# Patient Record
Sex: MALE | Race: WHITE | NOT HISPANIC OR LATINO | Employment: OTHER | URBAN - METROPOLITAN AREA
[De-identification: names, ages, dates, MRNs, and addresses within clinical notes are randomized per-mention and may not be internally consistent; named-entity substitution may affect disease eponyms.]

---

## 2018-03-05 ENCOUNTER — TELEPHONE (OUTPATIENT)
Dept: PRIMARY CARE | Facility: CLINIC | Age: 63
End: 2018-03-05

## 2018-03-06 LAB — HCV AB SER-ACNC: NEGATIVE

## 2018-03-07 PROBLEM — F98.8 ATTENTION DEFICIT DISORDER (ADD) WITHOUT HYPERACTIVITY: Status: ACTIVE | Noted: 2018-03-07

## 2018-03-07 PROBLEM — F32.A DEPRESSION: Status: ACTIVE | Noted: 2018-03-07

## 2018-03-07 PROBLEM — G47.33 OSA (OBSTRUCTIVE SLEEP APNEA): Status: ACTIVE | Noted: 2018-03-07

## 2018-03-07 PROBLEM — J30.9 ALLERGIC RHINITIS: Status: ACTIVE | Noted: 2018-03-07

## 2018-03-07 PROBLEM — K21.9 GERD (GASTROESOPHAGEAL REFLUX DISEASE): Status: ACTIVE | Noted: 2018-03-07

## 2018-03-07 PROBLEM — M17.9 OSTEOARTHRITIS OF KNEE: Status: ACTIVE | Noted: 2018-03-07

## 2018-03-07 PROBLEM — K63.5 POLYP OF COLON: Status: ACTIVE | Noted: 2018-03-07

## 2018-03-07 LAB
ALBUMIN SERPL-MCNC: 4.1 G/DL (ref 3.6–4.8)
ALBUMIN/GLOB SERPL: 1.7 {RATIO} (ref 1.2–2.2)
ALP SERPL-CCNC: 63 IU/L (ref 39–117)
ALT SERPL-CCNC: 22 IU/L (ref 0–44)
AST SERPL-CCNC: 23 IU/L (ref 0–40)
BASOPHILS # BLD AUTO: 0 X10E3/UL (ref 0–0.2)
BASOPHILS NFR BLD AUTO: 0 %
BILIRUB SERPL-MCNC: 0.5 MG/DL (ref 0–1.2)
BUN SERPL-MCNC: 20 MG/DL (ref 8–27)
BUN/CREAT SERPL: 22 (ref 10–24)
CALCIUM SERPL-MCNC: 9.2 MG/DL (ref 8.6–10.2)
CHLORIDE SERPL-SCNC: 102 MMOL/L (ref 96–106)
CHOLEST SERPL-MCNC: 198 MG/DL (ref 100–199)
CO2 SERPL-SCNC: 22 MMOL/L (ref 18–29)
CREAT SERPL-MCNC: 0.91 MG/DL (ref 0.76–1.27)
EOSINOPHIL # BLD AUTO: 0.3 X10E3/UL (ref 0–0.4)
EOSINOPHIL NFR BLD AUTO: 5 %
ERYTHROCYTE [DISTWIDTH] IN BLOOD BY AUTOMATED COUNT: 14.3 % (ref 12.3–15.4)
GFR SERPLBLD CREATININE-BSD FMLA CKD-EPI: 103 ML/MIN/1.73
GFR SERPLBLD CREATININE-BSD FMLA CKD-EPI: 89 ML/MIN/1.73
GLOBULIN SER CALC-MCNC: 2.4 G/DL (ref 1.5–4.5)
GLUCOSE SERPL-MCNC: 101 MG/DL (ref 65–99)
HCT VFR BLD AUTO: 42 % (ref 37.5–51)
HCV AB S/CO SERPL IA: <0.1 S/CO RATIO (ref 0–0.9)
HDLC SERPL-MCNC: 62 MG/DL
HGB BLD-MCNC: 14.6 G/DL (ref 13–17.7)
IMM GRANULOCYTES # BLD: 0 X10E3/UL (ref 0–0.1)
IMM GRANULOCYTES NFR BLD: 0 %
LDLC SERPL CALC-MCNC: 115 MG/DL (ref 0–99)
LYMPHOCYTES # BLD AUTO: 1.8 X10E3/UL (ref 0.7–3.1)
LYMPHOCYTES NFR BLD AUTO: 33 %
MCH RBC QN AUTO: 29.3 PG (ref 26.6–33)
MCHC RBC AUTO-ENTMCNC: 34.8 G/DL (ref 31.5–35.7)
MCV RBC AUTO: 84 FL (ref 79–97)
MONOCYTES # BLD AUTO: 0.6 X10E3/UL (ref 0.1–0.9)
MONOCYTES NFR BLD AUTO: 10 %
NEUTROPHILS # BLD AUTO: 2.8 X10E3/UL (ref 1.4–7)
NEUTROPHILS NFR BLD AUTO: 52 %
PLATELET # BLD AUTO: 240 X10E3/UL (ref 150–379)
POTASSIUM SERPL-SCNC: 4.3 MMOL/L (ref 3.5–5.2)
PROT SERPL-MCNC: 6.5 G/DL (ref 6–8.5)
PSA SERPL-MCNC: 0.8 NG/ML (ref 0–4)
RBC # BLD AUTO: 4.98 X10E6/UL (ref 4.14–5.8)
SODIUM SERPL-SCNC: 140 MMOL/L (ref 134–144)
TRIGL SERPL-MCNC: 103 MG/DL (ref 0–149)
TSH SERPL DL<=0.005 MIU/L-ACNC: 1.76 UIU/ML (ref 0.45–4.5)
VLDLC SERPL CALC-MCNC: 21 MG/DL (ref 5–40)
WBC # BLD AUTO: 5.5 X10E3/UL (ref 3.4–10.8)

## 2018-03-07 RX ORDER — CITALOPRAM 20 MG/1
10 TABLET, FILM COATED ORAL DAILY
COMMUNITY
Start: 2017-11-21 | End: 2019-01-07 | Stop reason: SDUPTHER

## 2018-03-07 RX ORDER — AMOXICILLIN 500 MG/1
2000 TABLET, FILM COATED ORAL ONCE
COMMUNITY
Start: 2017-11-06 | End: 2018-06-08 | Stop reason: SDUPTHER

## 2018-03-07 RX ORDER — FLUTICASONE PROPIONATE 50 MCG
2 SPRAY, SUSPENSION (ML) NASAL DAILY PRN
COMMUNITY
Start: 2016-03-10 | End: 2022-10-19 | Stop reason: ALTCHOICE

## 2018-03-07 RX ORDER — CHOLECALCIFEROL (VITAMIN D3) 50 MCG
2000 TABLET ORAL DAILY
COMMUNITY
Start: 2011-03-09 | End: 2022-10-10 | Stop reason: ALTCHOICE

## 2018-03-07 RX ORDER — OMEPRAZOLE 20 MG/1
20 CAPSULE, DELAYED RELEASE ORAL EVERY OTHER DAY
COMMUNITY
Start: 2016-04-01

## 2018-03-11 PROBLEM — K63.5 POLYP OF COLON: Chronic | Status: ACTIVE | Noted: 2018-03-07

## 2018-03-11 PROBLEM — J30.9 ALLERGIC RHINITIS: Chronic | Status: ACTIVE | Noted: 2018-03-07

## 2018-03-11 PROBLEM — G47.33 OSA (OBSTRUCTIVE SLEEP APNEA): Chronic | Status: ACTIVE | Noted: 2018-03-07

## 2018-03-11 PROBLEM — M17.9 OSTEOARTHRITIS OF KNEE: Chronic | Status: ACTIVE | Noted: 2018-03-07

## 2018-03-11 PROBLEM — F98.8 ATTENTION DEFICIT DISORDER (ADD) WITHOUT HYPERACTIVITY: Chronic | Status: ACTIVE | Noted: 2018-03-07

## 2018-03-11 PROBLEM — F32.A DEPRESSION: Chronic | Status: ACTIVE | Noted: 2018-03-07

## 2018-03-11 PROBLEM — K21.9 GERD (GASTROESOPHAGEAL REFLUX DISEASE): Chronic | Status: ACTIVE | Noted: 2018-03-07

## 2018-03-12 ENCOUNTER — OFFICE VISIT (OUTPATIENT)
Dept: PRIMARY CARE | Facility: CLINIC | Age: 63
End: 2018-03-12
Attending: FAMILY MEDICINE
Payer: COMMERCIAL

## 2018-03-12 VITALS
RESPIRATION RATE: 16 BRPM | HEIGHT: 70 IN | WEIGHT: 277.2 LBS | OXYGEN SATURATION: 98 % | SYSTOLIC BLOOD PRESSURE: 112 MMHG | HEART RATE: 72 BPM | TEMPERATURE: 97.5 F | BODY MASS INDEX: 39.69 KG/M2 | DIASTOLIC BLOOD PRESSURE: 78 MMHG

## 2018-03-12 DIAGNOSIS — M17.9 OSTEOARTHRITIS OF KNEE, UNSPECIFIED LATERALITY, UNSPECIFIED OSTEOARTHRITIS TYPE: Chronic | ICD-10-CM

## 2018-03-12 DIAGNOSIS — J30.2 CHRONIC SEASONAL ALLERGIC RHINITIS, UNSPECIFIED TRIGGER: Chronic | ICD-10-CM

## 2018-03-12 DIAGNOSIS — D12.6 ADENOMATOUS POLYP OF COLON, UNSPECIFIED PART OF COLON: Chronic | ICD-10-CM

## 2018-03-12 DIAGNOSIS — F98.8 ATTENTION DEFICIT DISORDER (ADD) WITHOUT HYPERACTIVITY: Chronic | ICD-10-CM

## 2018-03-12 DIAGNOSIS — K21.9 GASTROESOPHAGEAL REFLUX DISEASE, ESOPHAGITIS PRESENCE NOT SPECIFIED: Chronic | ICD-10-CM

## 2018-03-12 DIAGNOSIS — R20.2 PARESTHESIA OF RIGHT ARM: ICD-10-CM

## 2018-03-12 DIAGNOSIS — Z00.00 ENCOUNTER FOR GENERAL ADULT MEDICAL EXAMINATION WITHOUT ABNORMAL FINDINGS: Primary | ICD-10-CM

## 2018-03-12 DIAGNOSIS — G47.33 OSA (OBSTRUCTIVE SLEEP APNEA): Chronic | ICD-10-CM

## 2018-03-12 DIAGNOSIS — F32.89 OTHER DEPRESSION: Chronic | ICD-10-CM

## 2018-03-12 PROCEDURE — 99396 PREV VISIT EST AGE 40-64: CPT | Performed by: FAMILY MEDICINE

## 2018-03-12 ASSESSMENT — ENCOUNTER SYMPTOMS
NERVOUS/ANXIOUS: 0
SHORTNESS OF BREATH: 0
CHEST TIGHTNESS: 0
CHILLS: 0
FATIGUE: 0
MYALGIAS: 0
CONSTIPATION: 0
ABDOMINAL PAIN: 0
FLANK PAIN: 0
BRUISES/BLEEDS EASILY: 0
RHINORRHEA: 0
SPEECH DIFFICULTY: 0
LIGHT-HEADEDNESS: 0
HEADACHES: 0
SINUS PAIN: 0
SORE THROAT: 0
BACK PAIN: 0
WHEEZING: 0
EYE PAIN: 0
NECK PAIN: 0
TROUBLE SWALLOWING: 0
SLEEP DISTURBANCE: 0
COUGH: 0
VOMITING: 0
FREQUENCY: 0
APPETITE CHANGE: 0
DIZZINESS: 0
DYSPHORIC MOOD: 0
NUMBNESS: 1
HEMATURIA: 0
WEAKNESS: 0
UNEXPECTED WEIGHT CHANGE: 0
DYSURIA: 0
SINUS PRESSURE: 0
BLOOD IN STOOL: 0
DIFFICULTY URINATING: 0
FEVER: 0
NAUSEA: 0
EYE DISCHARGE: 0
NECK STIFFNESS: 0
CONFUSION: 0
EYE REDNESS: 0
DIARRHEA: 0
ARTHRALGIAS: 0

## 2018-03-12 NOTE — ASSESSMENT & PLAN NOTE
The patient is currently stable. Bloodwork was completed including a complete blood count, complete metabolic profile, thyroid stimulating hormone, PSA, and a lipid profile. All tests were normal except for slight elevation of fasting blood sugar. Recommended low carbohydrate and low concentrated sweet diet. The patient should continue to exercise at 70 percent of her maximal heart rate for 2.5 hours per week. All the recommend vaccinations are up to date. Should call back once Shingrix is available. The  patient should be evaluated by the ophthalmologist every 2 years and dentist every 6 months. The patient was advised to protect the skin by applying suntan lotion containing an SPF > 30 every 2 hours while in sun or after swimming. Instructed to avoid prolonged direct sun exposure as much as possible. The patient's body mass index is elevated. The patient was instructed to lose weight through diet and exercise to achieve a body mass index < 25. The patient's colonoscopy may be due at this time and recommended to call Dr. Dunaway to see if due since had 7 polyps on colonoscopy of 11/2014. Advised to consume 1000 mg of calcium daily through the diet. If the patient is unable to consume 1000 mg through the diet, then taking calcium supplements is an acceptable alternative. The patient was informed not to consume more than 500 mg of a calcium supplement at a time.

## 2018-03-12 NOTE — ASSESSMENT & PLAN NOTE
May be related to laying on right upper extremity when sleeping and cutting off circulation. Will try to place pillows to prevent rolling over on the arm to see if can stop symptoms. If not, refer, to Dr. Aroldo Truong for further evaluation and possible EMG and NCS.

## 2018-03-12 NOTE — PROGRESS NOTES
Subjective      Patient ID: Titus Ray is a 62 y.o. male.    The patient present for a physical examination.      Neuropathy    The onset of symptoms is gradual. It is located in the RUE region. The patient experiences pain during sleep. Severity of pain: severe. Quality of pain: tingling (pain in upper arm and tingling and numbness of lower arm).     Additional narrative: No trauma. Never comes on during day unless working with a computer mouse for a prolonged period of time. No weakness or arm. All other extremities are not affected.      The following have been reviewed and updated as appropriate in this visit:  Allergies  Meds  Problems         Past Medical History:   Diagnosis Date   • Allergic rhinitis     Controlled with zyrtec and Fluticasone nasal spray as needed. Worse in spring and fall.   • Attention deficit disorder (ADD) in adult     Managed on Straterra in the past but not effective.   • Colon polyps     Gastroenterologist: Dr. Dunaway. S/P colonoscopy with polypectomy in 2004. Colonoscopy with polypectomy x 7 (5 tubular adenomas and 2 hyperplastic polyps) on 11/5/2014. Next due 11/2017-11/2019.   • Depression     Controlled with citalopram.   • GERD (gastroesophageal reflux disease)     Managed on Omeprazole. Should take medication about 30 minutes prior to meal.  The patient should avoid caffeinated products. The patient should avoid carbonated beverages. The patient was instructed not to eat within 3 hours of bedtime. Pt instructed to avoid fatty meals. Suggested weight reduction.   • COURT on CPAP     Pulmonologist: Dr. Pulido. Managed with CPAP at 10 CM H2O   • Osteoarthritis of both knees     Orthopedic surgeon: Dr. Cheung. S/P left knee hemiarthroplasty in 2007. X-rays in 12/2014 reveal bilateral small effusions, advanced DJD left knee and mild right knee DJD. MRI of right knee (3/2015) with severe medial compartment osteoarthrosis with subchondral fracture of medial femoral  condyle with associated osteochondral fracture posteriorly. Has medial femoral condyle avascular necrosis.       Past Surgical History:   Procedure Laterality Date   • BONE CYST EXCISION Left     Left humeral bone cyst excision   • VARICOCELECTOMY  1985   • VASECTOMY         Family History   Problem Relation Age of Onset   • Arthritis Mother    • COPD Father    • Arthritis Sister    • Arthritis Brother    • Schizophrenia Brother    • No Known Problems Daughter    • No Known Problems Daughter    • No Known Problems Sister    • No Known Problems Son        Social History     Social History   • Marital status:      Spouse name: Marisela    • Number of children: 3   • Years of education: N/A     Occupational History   •       3 days week      Social History Main Topics   • Smoking status: Never Smoker   • Smokeless tobacco: Never Used   • Alcohol use Yes      Comment: all at different times rarely    • Drug use: No   • Sexual activity: Not on file     Other Topics Concern   • Not on file     Social History Narrative   • No narrative on file       Allergies   Allergen Reactions   • Cashew Nut    • Hydromorphone      Other reaction(s): Urticaria   • [place] Flavor      Other reaction(s):  shock, vasovagal syncope  mangos    • Pistachio Nut          Current Outpatient Prescriptions:   •  amoxicillin (AMOXIL) 500 mg tablet, Take 2,000 mg by mouth once. (Take 4 tablets one hour prior to procedure), Disp: , Rfl:   •  cetirizine 10 mg tablet, Take 10 mg by mouth daily., Disp: , Rfl:   •  cholecalciferol, vitamin D3, (VITAMIN D3) 2,000 unit tablet, Take 2,000 IU by mouth daily., Disp: , Rfl:   •  citalopram (CELEXA) 20 mg tablet, Take 20 mg by mouth daily., Disp: , Rfl:   •  fluticasone (FLONASE) 50 mcg/actuation nasal spray, Administer 2 sprays into each nostril daily., Disp: , Rfl:   •  multivitamin (THERAGRAN) tablet, Take 1 tablet by mouth daily., Disp: , Rfl:   •  omeprazole (PriLOSEC) 20 mg capsule,  Take 20 mg by mouth daily. Take 30 minutes prior to a meal., Disp: , Rfl:     Review of Systems   Constitutional: Negative for appetite change, chills, fatigue, fever and unexpected weight change.   HENT: Negative for congestion, ear pain, hearing loss, nosebleeds, postnasal drip, rhinorrhea, sinus pain, sinus pressure, sneezing, sore throat, tinnitus and trouble swallowing.    Eyes: Negative for pain, discharge, redness and visual disturbance.   Respiratory: Negative for cough, chest tightness, shortness of breath and wheezing.    Cardiovascular: Negative for chest pain and leg swelling.   Gastrointestinal: Negative for abdominal pain, blood in stool, constipation, diarrhea, nausea and vomiting.   Endocrine: Negative for cold intolerance and heat intolerance.   Genitourinary: Negative for decreased urine volume, difficulty urinating, dysuria, flank pain, frequency, hematuria and urgency.   Musculoskeletal: Negative for arthralgias, back pain, gait problem, myalgias, neck pain and neck stiffness.   Skin: Negative for rash.   Allergic/Immunologic: Negative for environmental allergies.   Neurological: Positive for numbness. Negative for dizziness, syncope, speech difficulty, weakness, light-headedness and headaches.        See HPI   Hematological: Does not bruise/bleed easily.   Psychiatric/Behavioral: Negative for behavioral problems, confusion, dysphoric mood and sleep disturbance. The patient is not nervous/anxious.        Objective     Physical Exam   Constitutional: He appears well-developed and well-nourished. He is cooperative.   HENT:   Head: Normocephalic and atraumatic.   Right Ear: Tympanic membrane, external ear and ear canal normal.   Left Ear: Tympanic membrane, external ear and ear canal normal.   Eyes: Conjunctivae, EOM and lids are normal. Pupils are equal, round, and reactive to light.   Neck: Full passive range of motion without pain. Carotid bruit is not present. No thyromegaly present.    Cardiovascular: Normal rate, regular rhythm and normal heart sounds.    Pulses:       Popliteal pulses are 2+ on the right side, and 2+ on the left side.        Dorsalis pedis pulses are 2+ on the right side, and 2+ on the left side.   Pulmonary/Chest: Breath sounds normal. No accessory muscle usage. No tachypnea. No respiratory distress.   Abdominal: Soft. Normal appearance and bowel sounds are normal. He exhibits no distension and no ascites. There is no hepatosplenomegaly. There is no tenderness. There is no rebound, no guarding and no CVA tenderness.   Neurological: He is alert. He has normal strength.   Skin: Skin is warm and dry. No rash noted.   Psychiatric: His speech is normal and behavior is normal. His mood appears not anxious. He does not exhibit a depressed mood.       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         Allergic rhinitis (Chronic)    Overview     Controlled with zyrtec and Fluticasone nasal spray as needed. Worse in spring and fall.           Polyp of colon (Chronic)    Overview     Gastroenterologist: Dr. Dunaway. S/P colonoscopy with polypectomy in 2004. Colonoscopy with polypectomy x 7 (5 tubular adenomas and 2 hyperplastic polyps) on 11/5/2014. Next due 11/2017-11/2019.           Depression (Chronic)    Overview     Controlled with citalopram.           GERD (gastroesophageal reflux disease) (Chronic)    Overview     Managed on Omeprazole. Should take medication about 30 minutes prior to meal.  The patient should avoid caffeinated products. The patient should avoid carbonated beverages. The patient was instructed not to eat within 3 hours of bedtime. Pt instructed to avoid fatty meals. Suggested weight reduction.           COURT (obstructive sleep apnea) (Chronic)    Overview     Pulmonologist: Dr. Pulido. Managed with CPAP at 10 CM H2O             Osteoarthritis of knee (Chronic)    Overview     Orthopedic surgeon: Dr. Cheung. S/P left knee hemiarthroplasty in 2007. X-rays in 12/2014 reveal bilateral small effusions, advanced DJD left knee and mild right knee DJD. MRI of right knee (3/2015) with severe medial compartment osteoarthrosis with subchondral fracture of medial femoral condyle with associated osteochondral fracture posteriorly. Has medial femoral condyle avascular necrosis. S/P right total knee arthroplasty in 1/2016.           Encounter for general adult medical examination without abnormal findings - Primary    Current Assessment & Plan     The patient is currently stable. Bloodwork was completed including a complete blood count, complete metabolic profile, thyroid stimulating hormone, PSA, and a lipid profile. All tests were normal except for slight elevation of fasting blood sugar. Recommended low carbohydrate and low concentrated sweet diet. The patient should continue to exercise at 70 percent of her maximal heart rate for 2.5 hours per week. All the recommend vaccinations are up to date. Should call back once Shingrix is available. The  patient should be evaluated by the ophthalmologist every 2 years and dentist every 6 months. The patient was advised to protect the skin by applying suntan lotion containing an SPF > 30 every 2 hours while in sun or after swimming. Instructed to avoid prolonged direct sun exposure as much as possible. The patient's body mass index is elevated. The patient was instructed to lose weight through diet and exercise to achieve a body mass index < 25. The patient's colonoscopy may be due at this time and recommended to call Dr. Dunaway to see if due since had 7 polyps on colonoscopy of 11/2014. Advised to consume 1000 mg of calcium daily through the diet. If the patient is unable to consume 1000 mg through the diet, then taking calcium supplements is an acceptable alternative. The patient was informed not to  consume more than 500 mg of a calcium supplement at a time.            Paresthesia of right arm    Current Assessment & Plan     May be related to laying on right upper extremity when sleeping and cutting off circulation. Will try to place pillows to prevent rolling over on the arm to see if can stop symptoms. If not, refer, to Dr. Aroldo Truong for further evaluation and possible EMG and NCS.         Relevant Orders    Ambulatory referral to Neurology                Tom Douglas MD  3/12/2018

## 2018-03-12 NOTE — PATIENT INSTRUCTIONS
The patient is currently stable. Bloodwork was completed including a complete blood count, complete metabolic profile, thyroid stimulating hormone, PSA, and a lipid profile. All tests were normal except for slight elevation of fasting blood sugar. Recommended low carbohydrate and low concentrated sweet diet. The patient should continue to exercise at 70 percent of her maximal heart rate for 2.5 hours per week. All the recommend vaccinations are up to date. Should call back once Shingrix is available. The  patient should be evaluated by the ophthalmologist every 2 years and dentist every 6 months. The patient was advised to protect the skin by applying suntan lotion containing an SPF > 30 every 2 hours while in sun or after swimming. Instructed to avoid prolonged direct sun exposure as much as possible. The patient's body mass index is elevated. The patient was instructed to lose weight through diet and exercise to achieve a body mass index < 25. The patient's colonoscopy may be due at this time and recommended to call Dr. Dunaway to see if due since had 7 polyps on colonoscopy of 11/2014. Advised to consume 1000 mg of calcium daily through the diet. If the patient is unable to consume 1000 mg through the diet, then taking calcium supplements is an acceptable alternative. The patient was informed not to consume more than 500 mg of a calcium supplement at a time.     Numbness may be related to laying on right upper extremity when sleeping and cutting off circulation. Will try to place pillows to prevent rolling over on the arm to see if can stop symptoms. If not, refer, to Dr. Aroldo Truong for further evaluation and possible EMG and NCS.

## 2018-04-19 ENCOUNTER — OFFICE VISIT (OUTPATIENT)
Dept: NEUROLOGY | Facility: CLINIC | Age: 63
End: 2018-04-19
Payer: COMMERCIAL

## 2018-04-19 VITALS — DIASTOLIC BLOOD PRESSURE: 76 MMHG | HEART RATE: 76 BPM | RESPIRATION RATE: 16 BRPM | SYSTOLIC BLOOD PRESSURE: 118 MMHG

## 2018-04-19 DIAGNOSIS — R52 PAIN: Primary | ICD-10-CM

## 2018-04-19 DIAGNOSIS — R20.0 NUMBNESS: ICD-10-CM

## 2018-04-19 PROCEDURE — 99244 OFF/OP CNSLTJ NEW/EST MOD 40: CPT | Performed by: PSYCHIATRY & NEUROLOGY

## 2018-04-19 NOTE — ASSESSMENT & PLAN NOTE
The patient has numbness and tingling in the right arm and left hand.  I reassured him that I do not believe that he is suffering from a central nervous system disorder affecting the brain or spinal cord.  His symptoms are likely peripheral in nature which is why we will go ahead with an EMG.

## 2018-04-19 NOTE — PROGRESS NOTES
Titus Ray is a 62 y.o. male  4/19/2018  Tom Douglas MD    Neurology Consult Note    Subjective     Titus Ray is a 62 y.o. male who is being evaluated for arm symptoms.  The patient reported that 20-30 years ago he experienced numbness and tingling in left digits 4 and 5.  At the time he does not recall any pain or weakness.  He remembers having had an EMG and was told that his symptoms were likely coming from his shoulder and not his neck or arm.  Ever since then he has been left with a mild degree of numbness and tingling in left digits 4 and 5.    The patient reported that in the summer 2017 he had pain in his right shoulder.  He denied any illness, trauma or inciting event other than participating as an amateur trombone player.  He noted that his pain was worse when using a computer mouse however changing the ergonomics of his desk helped.  3 months ago, the patient reported that he started waking up from sleep with numbness and discomfort throughout the entire right arm.  More recently he has developed positional numbness and discomfort in the right arm as well.    The patient denied neck pain.  He has mostly anterior shoulder pain which does not radiate.  He has no elbow or wrist pain.  He denied weakness or clumsiness.  His back and legs are unaffected.  He has had no bowel or bladder dysfunction.  He denied any other bulbar, axial or extremity symptoms.  He has no autonomic dysfunction.    Review of Systems  Constitutional: negative  Eyes: negative  Ears, nose, mouth, throat, and face: negative  Respiratory: negative  Cardiovascular: negative  Gastrointestinal: negative  Genitourinary:negative  Integument/breast: negative  Hematologic/lymphatic: negative  Musculoskeletal:negative  Neurological: negative  Behavioral/Psych: negative  Endocrine: negative  Allergic/Immunologic: negative    Allergy:  Allergies   Allergen Reactions   • Cashew Nut    • Hydromorphone      Other  reaction(s): Urticaria   • [place] Flavor      Other reaction(s):  shock, vasovagal syncope  mangos    • Pistachio Nut        Current Outpatient Prescriptions   Medication Sig Dispense Refill   • amoxicillin (AMOXIL) 500 mg tablet Take 2,000 mg by mouth once. (Take 4 tablets one hour prior to procedure)     • cetirizine 10 mg tablet Take 10 mg by mouth daily.     • cholecalciferol, vitamin D3, (VITAMIN D3) 2,000 unit tablet Take 2,000 IU by mouth daily.     • citalopram (CELEXA) 20 mg tablet Take 20 mg by mouth daily.     • fluticasone (FLONASE) 50 mcg/actuation nasal spray Administer 2 sprays into each nostril daily.     • multivitamin (THERAGRAN) tablet Take 1 tablet by mouth daily.     • omeprazole (PriLOSEC) 20 mg capsule Take 20 mg by mouth daily. Take 30 minutes prior to a meal.       No current facility-administered medications for this visit.        Past Medical History:  Past Medical History:   Diagnosis Date   • Allergic rhinitis     Controlled with zyrtec and Fluticasone nasal spray as needed. Worse in spring and fall.   • Attention deficit disorder (ADD) in adult     Managed on Straterra in the past but not effective.   • Colon polyps     Gastroenterologist: Dr. Dnuaway. S/P colonoscopy with polypectomy in 2004. Colonoscopy with polypectomy x 7 (5 tubular adenomas and 2 hyperplastic polyps) on 11/5/2014. Next due 11/2017-11/2019.   • Depression     Controlled with citalopram.   • GERD (gastroesophageal reflux disease)     Managed on Omeprazole. Should take medication about 30 minutes prior to meal.  The patient should avoid caffeinated products. The patient should avoid carbonated beverages. The patient was instructed not to eat within 3 hours of bedtime. Pt instructed to avoid fatty meals. Suggested weight reduction.   • COURT on CPAP     Pulmonologist: Dr. Pulido. Managed with CPAP at 10 CM H2O   • Osteoarthritis of both knees     Orthopedic surgeon: Dr. Cheung. S/P left knee hemiarthroplasty in  2007. X-rays in 12/2014 reveal bilateral small effusions, advanced DJD left knee and mild right knee DJD. MRI of right knee (3/2015) with severe medial compartment osteoarthrosis with subchondral fracture of medial femoral condyle with associated osteochondral fracture posteriorly. Has medial femoral condyle avascular necrosis.       Past Surgical History:  Past Surgical History:   Procedure Laterality Date   • BONE CYST EXCISION Left     Left humeral bone cyst excision   • VARICOCELECTOMY  1985   • VASECTOMY         Social History:  Social History     Social History   • Marital status:      Spouse name: Marisela    • Number of children: 3   • Years of education: N/A     Occupational History   •       3 days week      Social History Main Topics   • Smoking status: Never Smoker   • Smokeless tobacco: Never Used   • Alcohol use Yes      Comment: all at different times rarely    • Drug use: No   • Sexual activity: Not Asked     Other Topics Concern   • None     Social History Narrative   • None       Family History:  Family History   Problem Relation Age of Onset   • Arthritis Mother    • COPD Father    • Arthritis Sister    • Arthritis Brother    • Schizophrenia Brother    • No Known Problems Daughter    • No Known Problems Daughter    • No Known Problems Sister    • No Known Problems Son        Objective     Physical Exam  /76   Pulse 76   Resp 16     General Appearance:  Alert, no distress, appears stated age   Head:  Normocephalic, without obvious abnormality, atraumatic   Eyes:  PERRL, conjunctiva/corneas clear, EOM's intact, fundi benign, both eyes   Throat:  The tongue and uvula were midline   Neck: Supple, symmetrical, trachea midline, no adenopathy; thyroid: no enlargement/tenderness/nodules; no carotid bruit or JVD   Lungs:  Clear to auscultation bilaterally, respirations unlabored   Chest Wall: No tenderness or deformity   Heart Regular rate and rhythm, S1 and S2 normal, no  murmur, rub or gallop   Extremities: Extremities normal, atraumatic, no cyanosis or edema    Musculoskeletal: No injury or deformity   Pulses: 2+ and symmetric all extremities   Skin: Skin color, texture, turgor normal, no rashes or lesions   Behavior/Emotional: Appropriate, cooperative   Neurologic Exam:  Alert and oriented. Attention, concentration, memory, language, visual spatial orientation, executive function is normal.    Pupils equal round and reactive to light. Extraocular movement full with normal pursuit + saccades. No nystagmus noted.   Facial strength and sensation is normal. Hearing normal.  The tongue and uvula were midline. No dysarthria or dysphagia.   Strength was 5/5 in bulbar, axial + extremity muscles.There was normal bulk and tone with no abnormal movements.   The sensory examination was normal to touch, temperature and pain, vibration and proprioception. There was no dysmetria or cerebellar signs.   The gait was narrow based. Patient was able to tandem walk. Negative Romberg sign. Reflexes were + and symmetric. Christensen sign was negative. Plantar responses were flexor.       Problem List     Pain - Primary    Current Assessment & Plan     The patient has pain in his right shoulder and arm which is likely a combination of both musculoskeletal and neuromuscular pain.  The radiating pain and numbness are consistent with either a cervical radiculopathy, brachial plexopathy or focal neuropathy.  I recommended that the patient return for an EMG of the upper extremities.  In the future we will consider imaging the cervical spine and brachial plexus.  Additional diagnostics and treatments such as a neuropathic pain medication, physical therapy etc. will depend on his clinical course and the results of his pending studies.         Numbness    Current Assessment & Plan     The patient has numbness and tingling in the right arm and left hand.  I reassured him that I do not believe that he is suffering  from a central nervous system disorder affecting the brain or spinal cord.  His symptoms are likely peripheral in nature which is why we will go ahead with an EMG.                 It was a real pleasure meeting Titus Ray today, thank you for allowing me to participate in the medical care. If you have any questions, please call me at any time. Titus Ray will follow up with me in the coming weeks to months and keep me updated by telephone. Titus Ray knows to notify me immediately if there is any change in the condition or if there are any new symptoms of transient or static neurologic dysfunction.    Aroldo Truong MD

## 2018-04-19 NOTE — ASSESSMENT & PLAN NOTE
The patient has pain in his right shoulder and arm which is likely a combination of both musculoskeletal and neuromuscular pain.  The radiating pain and numbness are consistent with either a cervical radiculopathy, brachial plexopathy or focal neuropathy.  I recommended that the patient return for an EMG of the upper extremities.  In the future we will consider imaging the cervical spine and brachial plexus.  Additional diagnostics and treatments such as a neuropathic pain medication, physical therapy etc. will depend on his clinical course and the results of his pending studies.

## 2018-04-19 NOTE — LETTER
April 19, 2018     Tmo Douglas MD  [address] [facility]  [address]  [facility] [address]    Patient: Titus Ray   YOB: 1955   Date of Visit: 4/19/2018       Dear Dr. Douglas:    Thank you for referring Titus Ray to me for evaluation. Below are my notes for this consultation.    If you have questions, please do not hesitate to call me. I look forward to following your patient along with you.         Sincerely,        Aroldo Truong MD        CC: No Recipients  Aroldo Truong MD  4/19/2018  8:29 AM  Signed  Titus Ray is a 62 y.o. male  4/19/2018  Tom Douglas MD    Neurology Consult Note    Subjective     Titus Ray is a 62 y.o. male who is being evaluated for arm symptoms.  The patient reported that 20-30 years ago he experienced numbness and tingling in left digits 4 and 5.  At the time he does not recall any pain or weakness.  He remembers having had an EMG and was told that his symptoms were likely coming from his shoulder and not his neck or arm.  Ever since then he has been left with a mild degree of numbness and tingling in left digits 4 and 5.    The patient reported that in the summer 2017 he had pain in his right shoulder.  He denied any illness, trauma or inciting event other than participating as an amateur trombone player.  He noted that his pain was worse when using a computer mouse however changing the ergonomics of his desk helped.  3 months ago, the patient reported that he started waking up from sleep with numbness and discomfort throughout the entire right arm.  More recently he has developed positional numbness and discomfort in the right arm as well.    The patient denied neck pain.  He has mostly anterior shoulder pain which does not radiate.  He has no elbow or wrist pain.  He denied weakness or clumsiness.  His back and legs are unaffected.  He has had no bowel or bladder dysfunction.  He denied any other bulbar, axial  or extremity symptoms.  He has no autonomic dysfunction.    Review of Systems  Constitutional: negative  Eyes: negative  Ears, nose, mouth, throat, and face: negative  Respiratory: negative  Cardiovascular: negative  Gastrointestinal: negative  Genitourinary:negative  Integument/breast: negative  Hematologic/lymphatic: negative  Musculoskeletal:negative  Neurological: negative  Behavioral/Psych: negative  Endocrine: negative  Allergic/Immunologic: negative    Allergy:  Allergies   Allergen Reactions   • Cashew Nut    • Hydromorphone      Other reaction(s): Urticaria   • Golden Flavor      Other reaction(s):  shock, vasovagal syncope  mangos    • Pistachio Nut        Current Outpatient Prescriptions   Medication Sig Dispense Refill   • amoxicillin (AMOXIL) 500 mg tablet Take 2,000 mg by mouth once. (Take 4 tablets one hour prior to procedure)     • cetirizine 10 mg tablet Take 10 mg by mouth daily.     • cholecalciferol, vitamin D3, (VITAMIN D3) 2,000 unit tablet Take 2,000 IU by mouth daily.     • citalopram (CELEXA) 20 mg tablet Take 20 mg by mouth daily.     • fluticasone (FLONASE) 50 mcg/actuation nasal spray Administer 2 sprays into each nostril daily.     • multivitamin (THERAGRAN) tablet Take 1 tablet by mouth daily.     • omeprazole (PriLOSEC) 20 mg capsule Take 20 mg by mouth daily. Take 30 minutes prior to a meal.       No current facility-administered medications for this visit.        Past Medical History:  Past Medical History:   Diagnosis Date   • Allergic rhinitis     Controlled with zyrtec and Fluticasone nasal spray as needed. Worse in spring and fall.   • Attention deficit disorder (ADD) in adult     Managed on Straterra in the past but not effective.   • Colon polyps     Gastroenterologist: Dr. Dunaway. S/P colonoscopy with polypectomy in 2004. Colonoscopy with polypectomy x 7 (5 tubular adenomas and 2 hyperplastic polyps) on 11/5/2014. Next due 11/2017-11/2019.   • Depression     Controlled with  citalopram.   • GERD (gastroesophageal reflux disease)     Managed on Omeprazole. Should take medication about 30 minutes prior to meal.  The patient should avoid caffeinated products. The patient should avoid carbonated beverages. The patient was instructed not to eat within 3 hours of bedtime. Pt instructed to avoid fatty meals. Suggested weight reduction.   • COURT on CPAP     Pulmonologist: Dr. Pulido. Managed with CPAP at 10 CM H2O   • Osteoarthritis of both knees     Orthopedic surgeon: Dr. Cheung. S/P left knee hemiarthroplasty in 2007. X-rays in 12/2014 reveal bilateral small effusions, advanced DJD left knee and mild right knee DJD. MRI of right knee (3/2015) with severe medial compartment osteoarthrosis with subchondral fracture of medial femoral condyle with associated osteochondral fracture posteriorly. Has medial femoral condyle avascular necrosis.       Past Surgical History:  Past Surgical History:   Procedure Laterality Date   • BONE CYST EXCISION Left     Left humeral bone cyst excision   • VARICOCELECTOMY  1985   • VASECTOMY         Social History:  Social History     Social History   • Marital status:      Spouse name: Marisela    • Number of children: 3   • Years of education: N/A     Occupational History   •       3 days week      Social History Main Topics   • Smoking status: Never Smoker   • Smokeless tobacco: Never Used   • Alcohol use Yes      Comment: all at different times rarely    • Drug use: No   • Sexual activity: Not Asked     Other Topics Concern   • None     Social History Narrative   • None       Family History:  Family History   Problem Relation Age of Onset   • Arthritis Mother    • COPD Father    • Arthritis Sister    • Arthritis Brother    • Schizophrenia Brother    • No Known Problems Daughter    • No Known Problems Daughter    • No Known Problems Sister    • No Known Problems Son        Objective     Physical Exam  /76   Pulse 76   Resp 16      General Appearance:  Alert, no distress, appears stated age   Head:  Normocephalic, without obvious abnormality, atraumatic   Eyes:  PERRL, conjunctiva/corneas clear, EOM's intact, fundi benign, both eyes   Throat:  The tongue and uvula were midline   Neck: Supple, symmetrical, trachea midline, no adenopathy; thyroid: no enlargement/tenderness/nodules; no carotid bruit or JVD   Lungs:  Clear to auscultation bilaterally, respirations unlabored   Chest Wall: No tenderness or deformity   Heart Regular rate and rhythm, S1 and S2 normal, no murmur, rub or gallop   Extremities: Extremities normal, atraumatic, no cyanosis or edema    Musculoskeletal: No injury or deformity   Pulses: 2+ and symmetric all extremities   Skin: Skin color, texture, turgor normal, no rashes or lesions   Behavior/Emotional: Appropriate, cooperative   Neurologic Exam:  Alert and oriented. Attention, concentration, memory, language, visual spatial orientation, executive function is normal.    Pupils equal round and reactive to light. Extraocular movement full with normal pursuit + saccades. No nystagmus noted.   Facial strength and sensation is normal. Hearing normal.  The tongue and uvula were midline. No dysarthria or dysphagia.   Strength was 5/5 in bulbar, axial + extremity muscles.There was normal bulk and tone with no abnormal movements.   The sensory examination was normal to touch, temperature and pain, vibration and proprioception. There was no dysmetria or cerebellar signs.   The gait was narrow based. Patient was able to tandem walk. Negative Romberg sign. Reflexes were + and symmetric. Christensen sign was negative. Plantar responses were flexor.       Problem List     Pain - Primary    Current Assessment & Plan     The patient has pain in his right shoulder and arm which is likely a combination of both musculoskeletal and neuromuscular pain.  The radiating pain and numbness are consistent with either a cervical radiculopathy, brachial  plexopathy or focal neuropathy.  I recommended that the patient return for an EMG of the upper extremities.  In the future we will consider imaging the cervical spine and brachial plexus.  Additional diagnostics and treatments such as a neuropathic pain medication, physical therapy etc. will depend on his clinical course and the results of his pending studies.         Numbness    Current Assessment & Plan     The patient has numbness and tingling in the right arm and left hand.  I reassured him that I do not believe that he is suffering from a central nervous system disorder affecting the brain or spinal cord.  His symptoms are likely peripheral in nature which is why we will go ahead with an EMG.                 It was a real pleasure meeting Titus Ray today, thank you for allowing me to participate in the medical care. If you have any questions, please call me at any time. Titus Ray will follow up with me in the coming weeks to months and keep me updated by telephone. Titus Ray knows to notify me immediately if there is any change in the condition or if there are any new symptoms of transient or static neurologic dysfunction.    Aroldo Truong MD

## 2018-05-30 ENCOUNTER — OFFICE VISIT (OUTPATIENT)
Dept: NEUROLOGY | Facility: CLINIC | Age: 63
End: 2018-05-30
Payer: COMMERCIAL

## 2018-05-30 VITALS — RESPIRATION RATE: 16 BRPM | SYSTOLIC BLOOD PRESSURE: 118 MMHG | HEART RATE: 76 BPM | DIASTOLIC BLOOD PRESSURE: 76 MMHG

## 2018-05-30 DIAGNOSIS — R53.1 WEAKNESS: ICD-10-CM

## 2018-05-30 DIAGNOSIS — R20.0 NUMBNESS: Primary | ICD-10-CM

## 2018-05-30 PROBLEM — G56.00 CARPAL TUNNEL SYNDROME: Chronic | Status: ACTIVE | Noted: 2018-05-30

## 2018-05-30 PROCEDURE — 95886 MUSC TEST DONE W/N TEST COMP: CPT | Performed by: PSYCHIATRY & NEUROLOGY

## 2018-05-30 PROCEDURE — 95911 NRV CNDJ TEST 9-10 STUDIES: CPT | Performed by: PSYCHIATRY & NEUROLOGY

## 2018-05-30 NOTE — PROCEDURES
Titus Ray  1955  5/30/2018  Tom Douglas MD      Clinical History:  Titus Ray is a 62-year-old who is being evaluated for arm symptoms.  The patient denied neck or arm pain.  He has numbness and tingling in both arms and hands mostly affecting left digits 4 and 5 and right digits 2 and 3.  At times the hands feel clumsy however he denied weakness.    Physical Examination:  He was a pleasant appearing man in no acute distress.  There was normal bulk and tone with no abnormal movements.  Strength was 5/5.  There was patchy sensory loss.  Reflexes were plus and symmetric.    Nerve Conduction Studies:  Bilateral median and left ulnar sensory responses were absent.  Bilateral radial and right ulnar sensory studies were normal.    The right median distal motor latency was significantly prolonged while the left was mildly prolonged.  Bilateral ulnar motor conduction velocities were minimally slowed across the elbows.    Needle EMG:  Needle EMG of selected muscles of the left C5-T1 myotomes was performed.  There was evidence of mild chronic denervation in intrinsic hand muscles.  The remainder of the muscle studied was normal.  There was no evidence of acute denervation (fibrillation potentials or positive sharp waves).    Impression:  1.  Severe right median neuropathy at the wrist (carpal tunnel syndrome).  2.  Mild to moderate left median neuropathy at the wrist.  3.  Minimal bilateral ulnar neuropathies at the elbows.    Clinical Note:  This study was notable for bilateral carpal tunnel syndrome right greater than left as well as ulnar neuropathies at the elbows left greater than right.  For now the patient will be conservative by wearing wrist splints and avoiding pressure at his elbows.  There was no evidence of a cervical radiculopathy, brachial plexopathy or polyneuropathy.    Thank you, and please call with any questions.      Sincerely,  Aroldo Truong M.D.

## 2018-05-30 NOTE — LETTER
May 30, 2018     Tom Douglas MD  [address] [facility]  [address]  [facility] [address]    Patient: Titus Ray   YOB: 1955   Date of Visit: 5/30/2018       Dear Dr. Douglas:    Thank you for referring Titus Ray to me for evaluation. Below are my notes for this consultation.    If you have questions, please do not hesitate to call me. I look forward to following your patient along with you.         Sincerely,        Aroldo Truong MD        CC: No Recipients        Aroldo Truong MD  5/30/2018  8:09 AM  Signed  Titus Ray  1955  5/30/2018  Tom Douglas MD      Clinical History:  Titus Ray is a 62-year-old who is being evaluated for arm symptoms.  The patient denied neck or arm pain.  He has numbness and tingling in both arms and hands mostly affecting left digits 4 and 5 and right digits 2 and 3.  At times the hands feel clumsy however he denied weakness.    Physical Examination:  He was a pleasant appearing man in no acute distress.  There was normal bulk and tone with no abnormal movements.  Strength was 5/5.  There was patchy sensory loss.  Reflexes were plus and symmetric.    Nerve Conduction Studies:  Bilateral median and left ulnar sensory responses were absent.  Bilateral radial and right ulnar sensory studies were normal.    The right median distal motor latency was significantly prolonged while the left was mildly prolonged.  Bilateral ulnar motor conduction velocities were minimally slowed across the elbows.    Needle EMG:  Needle EMG of selected muscles of the left C5-T1 myotomes was performed.  There was evidence of mild chronic denervation in intrinsic hand muscles.  The remainder of the muscle studied was normal.  There was no evidence of acute denervation (fibrillation potentials or positive sharp waves).    Impression:  1.  Severe right median neuropathy at the wrist (carpal tunnel syndrome).  2.  Mild to moderate left median  neuropathy at the wrist.  3.  Minimal bilateral ulnar neuropathies at the elbows.    Clinical Note:  This study was notable for bilateral carpal tunnel syndrome right greater than left as well as ulnar neuropathies at the elbows left greater than right.  For now the patient will be conservative by wearing wrist splints and avoiding pressure at his elbows.  There was no evidence of a cervical radiculopathy, brachial plexopathy or polyneuropathy.    Thank you, and please call with any questions.      Sincerely,  Aroldo Truong M.D.

## 2018-06-06 ENCOUNTER — TELEPHONE (OUTPATIENT)
Dept: NEUROLOGY | Facility: CLINIC | Age: 63
End: 2018-06-06

## 2018-06-07 ENCOUNTER — HOSPITAL ENCOUNTER (OUTPATIENT)
Facility: CLINIC | Age: 63
Discharge: HOME | End: 2018-06-07
Attending: FAMILY MEDICINE
Payer: COMMERCIAL

## 2018-06-07 ENCOUNTER — APPOINTMENT (OUTPATIENT)
Dept: RADIOLOGY | Age: 63
End: 2018-06-07
Attending: FAMILY MEDICINE
Payer: COMMERCIAL

## 2018-06-07 VITALS
WEIGHT: 280 LBS | TEMPERATURE: 98.4 F | HEIGHT: 69 IN | SYSTOLIC BLOOD PRESSURE: 138 MMHG | OXYGEN SATURATION: 98 % | DIASTOLIC BLOOD PRESSURE: 86 MMHG | BODY MASS INDEX: 41.47 KG/M2 | HEART RATE: 72 BPM | RESPIRATION RATE: 18 BRPM

## 2018-06-07 DIAGNOSIS — J02.9 SORETHROAT: Primary | ICD-10-CM

## 2018-06-07 DIAGNOSIS — M54.2 NECK PAIN: ICD-10-CM

## 2018-06-07 LAB — S PYO AG THROAT QL: NEGATIVE

## 2018-06-07 PROCEDURE — 87880 STREP A ASSAY W/OPTIC: CPT | Performed by: FAMILY MEDICINE

## 2018-06-07 PROCEDURE — S9083 URGENT CARE CENTER GLOBAL: HCPCS | Performed by: FAMILY MEDICINE

## 2018-06-07 PROCEDURE — 99203 OFFICE O/P NEW LOW 30 MIN: CPT | Performed by: FAMILY MEDICINE

## 2018-06-07 PROCEDURE — 72040 X-RAY EXAM NECK SPINE 2-3 VW: CPT | Performed by: FAMILY MEDICINE

## 2018-06-07 RX ORDER — KETOROLAC TROMETHAMINE 30 MG/ML
30 INJECTION, SOLUTION INTRAMUSCULAR; INTRAVENOUS ONCE
Status: COMPLETED | OUTPATIENT
Start: 2018-06-07 | End: 2018-06-07

## 2018-06-07 RX ORDER — NAPROXEN 500 MG/1
500 TABLET ORAL 2 TIMES DAILY WITH MEALS
Qty: 14 TABLET | Refills: 0 | Status: SHIPPED | OUTPATIENT
Start: 2018-06-07 | End: 2018-11-30 | Stop reason: ALTCHOICE

## 2018-06-07 RX ORDER — CYCLOBENZAPRINE HCL 5 MG
5 TABLET ORAL 3 TIMES DAILY PRN
Qty: 15 TABLET | Refills: 0 | Status: SHIPPED | OUTPATIENT
Start: 2018-06-07 | End: 2018-11-30 | Stop reason: ALTCHOICE

## 2018-06-07 RX ADMIN — KETOROLAC TROMETHAMINE 30 MG: 30 INJECTION, SOLUTION INTRAMUSCULAR; INTRAVENOUS at 20:55

## 2018-06-08 RX ORDER — AMOXICILLIN 500 MG/1
CAPSULE ORAL
Qty: 4 CAPSULE | Refills: 3 | Status: SHIPPED | OUTPATIENT
Start: 2018-06-08 | End: 2018-10-11

## 2018-06-08 ASSESSMENT — ENCOUNTER SYMPTOMS
CARDIOVASCULAR NEGATIVE: 1
NEUROLOGICAL NEGATIVE: 1
BACK PAIN: 0
EYES NEGATIVE: 1
NECK PAIN: 1
RESPIRATORY NEGATIVE: 1
SINUS PRESSURE: 0
GASTROINTESTINAL NEGATIVE: 1
CONSTITUTIONAL NEGATIVE: 1
SORE THROAT: 1

## 2018-06-08 NOTE — ED PROVIDER NOTES
History  Chief Complaint   Patient presents with   • Neck Pain     He is here with his wife with the c/o neck pain back of the neck both side,feels spasm like  No fall or injyr  Did lift some weights 3-4 days ago but neck pain only started this morning  He is also c/o sorethroat but not clear it hurts for him to swallow or it hurts in the neck while swallowing  No fever  No rash  No recent tick bite  No sick contacts recently             Past Medical History:   Diagnosis Date   • Allergic rhinitis     Controlled with zyrtec and Fluticasone nasal spray as needed. Worse in spring and fall.   • Attention deficit disorder (ADD) in adult     Managed on Straterra in the past but not effective.   • Carpal tunnel syndrome 5/30/2018    Neurologist: Dr. Truong. EMG in 05/2018 with bilateral median neuropathy with right severe and left mild to moderate. Being treated with wrist splints.   • Colon polyps     Gastroenterologist: Dr. Dunaway. S/P colonoscopy with polypectomy in 2004. Colonoscopy with polypectomy x 7 (5 tubular adenomas and 2 hyperplastic polyps) on 11/5/2014. Next due 11/2017-11/2019.   • Depression     Controlled with citalopram.   • GERD (gastroesophageal reflux disease)     Managed on Omeprazole. Should take medication about 30 minutes prior to meal.  The patient should avoid caffeinated products. The patient should avoid carbonated beverages. The patient was instructed not to eat within 3 hours of bedtime. Pt instructed to avoid fatty meals. Suggested weight reduction.   • COURT on CPAP     Pulmonologist: Dr. Pulido. Managed with CPAP at 10 CM H2O   • Osteoarthritis of both knees     Orthopedic surgeon: Dr. Cheung. S/P left knee hemiarthroplasty in 2007. X-rays in 12/2014 reveal bilateral small effusions, advanced DJD left knee and mild right knee DJD. MRI of right knee (3/2015) with severe medial compartment osteoarthrosis with subchondral fracture of medial femoral condyle with associated  osteochondral fracture posteriorly. Has medial femoral condyle avascular necrosis.       Past Surgical History:   Procedure Laterality Date   • BONE CYST EXCISION Left     Left humeral bone cyst excision   • VARICOCELECTOMY  1985   • VASECTOMY         Family History   Problem Relation Age of Onset   • Arthritis Mother    • COPD Father    • Arthritis Sister    • Arthritis Brother    • Schizophrenia Brother    • No Known Problems Daughter    • No Known Problems Daughter    • No Known Problems Sister    • No Known Problems Son        Social History   Substance Use Topics   • Smoking status: Never Smoker   • Smokeless tobacco: Never Used   • Alcohol use Yes      Comment: all at different times rarely        Review of Systems   Constitutional: Negative.    HENT: Positive for sore throat. Negative for congestion, postnasal drip and sinus pressure.    Eyes: Negative.    Respiratory: Negative.    Cardiovascular: Negative.    Gastrointestinal: Negative.    Genitourinary: Negative.    Musculoskeletal: Positive for neck pain. Negative for back pain.   Neurological: Negative.        Physical Exam  ED Triage Vitals [06/07/18 1954]   Temp Heart Rate Resp BP SpO2   36.9 °C (98.4 °F) 72 18 138/86 98 %      Temp src Heart Rate Source Patient Position BP Location FiO2 (%) (Set)   -- -- -- -- --       Physical Exam   Constitutional: He is oriented to person, place, and time. No distress.   HENT:   Right Ear: External ear normal.   Left Ear: External ear normal.   Nose: Nose normal.   Mouth/Throat: Oropharynx is clear and moist. No oropharyngeal exudate.   Eyes: Conjunctivae are normal. Pupils are equal, round, and reactive to light.   Neck: Normal range of motion. Neck supple.   Cardiovascular: Normal rate, regular rhythm and normal heart sounds.    Pulmonary/Chest: Effort normal and breath sounds normal. No respiratory distress. He has no wheezes. He has no rales.   Musculoskeletal:   Neck / C spine:no point tenderness over th C  spine but has paraspinal muscle spasm and discomfort both sides.no swelling .has full ROM but gets discomfort after certain extent  ? Small tiny pimple like left side of the neck posteriorly but no vesicles noted on my exam today    Lymphadenopathy:     He has no cervical adenopathy.   Neurological: He is alert and oriented to person, place, and time. He displays normal reflexes. No cranial nerve deficit or sensory deficit. He exhibits normal muscle tone. Coordination normal.         Procedures  Procedures    UC Course  ED Course          Clinical Impressions as of Jun 08 1056   Sorethroat   Neck pain       MDM  Number of Diagnoses or Management Options  Neck pain:   Sorethroat:   Diagnosis management comments: Could be strain /muscle spasm   x ray C spine done which is normal  Given toradol here   Given script for naprosyn /flexeril prn  ( advised to start naprosyn only from tomorrow )   Needs caution with naprosyn as he has GERD but he is taking prilosec  D/w patient if gets rash neck ,with vesciles like needs recheck here or with pcp for possible early shingles as some patients they may get pain first then rash ,he and wife agreed and understood the instrucitons  Advised to go to ER if symptoms gets worse or with fever   Rapid strep done which is normal  Sent for throat culture .                   Luann Lundy MD  06/08/18 1105       Luann Lundy MD  06/08/18 1105       Luann Lundy MD  06/08/18 1107

## 2018-06-08 NOTE — DISCHARGE INSTRUCTIONS
Go to [facility] emergency room if symptoms gets worse   Follow up with your primary care doctor in 2-3 days for recheck,call for an appointment to be seen  Also may need to see orthopedics if symptoms persits or gets worse .needs further evaluation

## 2018-06-10 LAB — S PYO THROAT QL CULT: NEGATIVE

## 2018-06-12 ENCOUNTER — TELEPHONE (OUTPATIENT)
Dept: URGENT CARE | Facility: CLINIC | Age: 63
End: 2018-06-12

## 2018-06-12 NOTE — PROGRESS NOTES
Tried to call patient and unable to reach,left the message that his throat culture is normal     Called [facility] as I still didn't get the result in the epic ,so they gave me the results verbally that his throat culture is normal,they are going to fax the results manually to the office fax    As I am not working today I will check that tomorrow when I go to the office

## 2018-06-12 NOTE — ED NOTES
Tried to call patient and unable to reach,left the message that his throat culture is normal     Called [facility] as I still didn't get the result in the epic ,so they gave me the results verbally that his throat culture is normal,they are going to fax the results manually to the office fax    As I am not working today I will check that tomorrow when I go to the office      Luann Lundy MD  06/12/18 0958

## 2018-06-14 ENCOUNTER — TELEPHONE (OUTPATIENT)
Dept: URGENT CARE | Facility: CLINIC | Age: 63
End: 2018-06-14

## 2018-06-14 NOTE — ED NOTES
Please see the prior telephone call for the details     Got the throat culture results from [facility] on a paper from fax manually some how not sure why it is not updated to epic /i didn't get it in basket   But reviwed the throat culture results which is normal      Luann Lundy MD  06/14/18 [id number]

## 2018-08-27 ENCOUNTER — OFFICE VISIT (OUTPATIENT)
Dept: NEUROLOGY | Facility: CLINIC | Age: 63
End: 2018-08-27
Payer: COMMERCIAL

## 2018-08-27 VITALS — HEART RATE: 66 BPM | RESPIRATION RATE: 16 BRPM | DIASTOLIC BLOOD PRESSURE: 66 MMHG | SYSTOLIC BLOOD PRESSURE: 112 MMHG

## 2018-08-27 DIAGNOSIS — R20.0 NUMBNESS: Primary | ICD-10-CM

## 2018-08-27 PROCEDURE — 99213 OFFICE O/P EST LOW 20 MIN: CPT | Performed by: PSYCHIATRY & NEUROLOGY

## 2018-08-27 NOTE — PROGRESS NOTES
Titus Ray is a 62 y.o. male  8/27/2018  Tom Douglas MD    Neurology Follow Up Note    Subjective     Titus Ray is a 62 y.o. male who is being evaluated  for hand symptoms.  I previously saw the patient about 3 months ago.  At that time he was found to have bilateral carpal tunnel syndrome and ulnar neuropathies.  He was not interested in a surgical evaluation.  I recommended he wear wrist splints.    Since his last visit, overall the patient has been wearing wrist splints every night.  In addition, he has been attempting to avoid applying pressure to his elbows.  He reported that his symptoms have dramatically improved although have not fully resolved.  He does a significant amount of computer work.  Sometimes when he is using a mouse he will have numbness and tingling in right digits 1 2 and 3.  At that time the right hand may feel slightly clumsy although he denied significant weakness.  He very rarely experiences numbness and tingling in the left hand and does not have any weakness.    The patient has no neck or arm pain.  He has no back or leg symptoms.  His feet are completely unaffected.  Detailed neurologic and medical review of systems was otherwise unremarkable.  There were no symptoms to suggest increased intracranial pressure, meningitis or systemic illness.    Review of Systems  Constitutional: negative  Eyes: negative  Ears, nose, mouth, throat, and face: negative  Respiratory: negative  Cardiovascular: negative  Gastrointestinal: negative  Genitourinary:negative  Integument/breast: negative  Hematologic/lymphatic: negative  Musculoskeletal:negative  Neurological: negative  Behavioral/Psych: negative  Endocrine: negative  Allergic/Immunologic: negative    Current Outpatient Prescriptions   Medication Sig Dispense Refill   • amoxicillin (AMOXIL) 500 mg capsule TAKE 4 CAPSULES BY ORAL ROUTE 1 HOUR PRIOR TO PROCEDURE 4 capsule 3   • cetirizine 10 mg tablet Take 10 mg by mouth  daily.     • cholecalciferol, vitamin D3, (VITAMIN D3) 2,000 unit tablet Take 2,000 IU by mouth daily.     • citalopram (CELEXA) 20 mg tablet Take 10 mg by mouth daily.       • cyclobenzaprine (FLEXERIL) 5 mg tablet Take 1 tablet (5 mg total) by mouth 3 (three) times a day as needed for muscle spasms for up to 5 days. Caution with drowsiness 15 tablet 0   • fluticasone (FLONASE) 50 mcg/actuation nasal spray Administer 2 sprays into each nostril daily as needed.       • multivitamin (THERAGRAN) tablet Take 1 tablet by mouth daily.     • omeprazole (PriLOSEC) 20 mg capsule Take 20 mg by mouth daily. Take 30 minutes prior to a meal.       No current facility-administered medications for this visit.        PMH/SH/FH : Unchanged since previous visit.    Objective     Physical Exam  /66   Pulse 66   Resp 16     General Appearance:  Alert, no distress, appears stated age               Neurologic Exam:  Alert and oriented. Attention, concentration, memory, language, visual spatial orientation, executive function is normal.      Facial strength and sensation is normal. Hearing normal.  The tongue and uvula were midline. No dysarthria or dysphagia.   Strength was 5/5 in bulbar, axial + extremity muscles.There was normal bulk and tone with no abnormal movements.   The sensory examination was normal to touch, temperature and pain, vibration and proprioception. There was no dysmetria or cerebellar signs.   The gait was narrow based. Reflexes were ++ and symmetric. Christensen sign was negative.          Problem List Items Addressed This Visit     Numbness - Primary    Current Assessment & Plan     The patient has numbness and paresthesias in his hands.  He has a combination of carpal tunnel syndrome and ulnar neuropathies at the elbows.  He is now wearing wrist splints at night and trying to avoid pressure at his elbows.  We discussed how his symptoms could continue to improve, remain the same or get worse.  While I  understand he does not want an aggressive surgical intervention, he understands that he does not want to risk of permanent nerve damage.  For now we will take a wait and see approach and in the future additional diagnostics and treatments will depend on his clinical course.               It was a real pleasure treating Titus Ray today, thank you for allowing me to participate in the medical care. If you have any questions, please call me at any time. Titus Ray will follow up with me in the coming weeks to months and keep me updated by telephone. Titus Ray knows to notify me immediately if there is any change in the condition or if there are any new symptoms of transient or static neurologic dysfunction.    Aroldo Truong MD

## 2018-08-27 NOTE — ASSESSMENT & PLAN NOTE
The patient has numbness and paresthesias in his hands.  He has a combination of carpal tunnel syndrome and ulnar neuropathies at the elbows.  He is now wearing wrist splints at night and trying to avoid pressure at his elbows.  We discussed how his symptoms could continue to improve, remain the same or get worse.  While I understand he does not want an aggressive surgical intervention, he understands that he does not want to risk of permanent nerve damage.  For now we will take a wait and see approach and in the future additional diagnostics and treatments will depend on his clinical course.

## 2018-08-27 NOTE — LETTER
August 27, 2018     Tom Douglas MD  [address]  [address]  [facility] [address]    Patient: Titus Ray   YOB: 1955   Date of Visit: 8/27/2018       Dear Dr. Douglas:    Thank you for referring Titus Ray to me for evaluation. Below are my notes for this consultation.    If you have questions, please do not hesitate to call me. I look forward to following your patient along with you.         Sincerely,        Aroldo Truong MD        CC: No Recipients  Aroldo Truong MD  8/27/2018 11:07 AM  Signed  Titus Ray is a 62 y.o. male  8/27/2018  Tom Douglas MD    Neurology Follow Up Note    Subjective     Titus Ray is a 62 y.o. male who is being evaluated  for hand symptoms.  I previously saw the patient about 3 months ago.  At that time he was found to have bilateral carpal tunnel syndrome and ulnar neuropathies.  He was not interested in a surgical evaluation.  I recommended he wear wrist splints.    Since his last visit, overall the patient has been wearing wrist splints every night.  In addition, he has been attempting to avoid applying pressure to his elbows.  He reported that his symptoms have dramatically improved although have not fully resolved.  He does a significant amount of computer work.  Sometimes when he is using a mouse he will have numbness and tingling in right digits 1 2 and 3.  At that time the right hand may feel slightly clumsy although he denied significant weakness.  He very rarely experiences numbness and tingling in the left hand and does not have any weakness.    The patient has no neck or arm pain.  He has no back or leg symptoms.  His feet are completely unaffected.  Detailed neurologic and medical review of systems was otherwise unremarkable.  There were no symptoms to suggest increased intracranial pressure, meningitis or systemic illness.    Review of Systems  Constitutional: negative  Eyes: negative  Ears,  nose, mouth, throat, and face: negative  Respiratory: negative  Cardiovascular: negative  Gastrointestinal: negative  Genitourinary:negative  Integument/breast: negative  Hematologic/lymphatic: negative  Musculoskeletal:negative  Neurological: negative  Behavioral/Psych: negative  Endocrine: negative  Allergic/Immunologic: negative    Current Outpatient Prescriptions   Medication Sig Dispense Refill   • amoxicillin (AMOXIL) 500 mg capsule TAKE 4 CAPSULES BY ORAL ROUTE 1 HOUR PRIOR TO PROCEDURE 4 capsule 3   • cetirizine 10 mg tablet Take 10 mg by mouth daily.     • cholecalciferol, vitamin D3, (VITAMIN D3) 2,000 unit tablet Take 2,000 IU by mouth daily.     • citalopram (CELEXA) 20 mg tablet Take 10 mg by mouth daily.       • cyclobenzaprine (FLEXERIL) 5 mg tablet Take 1 tablet (5 mg total) by mouth 3 (three) times a day as needed for muscle spasms for up to 5 days. Caution with drowsiness 15 tablet 0   • fluticasone (FLONASE) 50 mcg/actuation nasal spray Administer 2 sprays into each nostril daily as needed.       • multivitamin (THERAGRAN) tablet Take 1 tablet by mouth daily.     • omeprazole (PriLOSEC) 20 mg capsule Take 20 mg by mouth daily. Take 30 minutes prior to a meal.       No current facility-administered medications for this visit.        PMH/SH/FH : Unchanged since previous visit.    Objective     Physical Exam  /66   Pulse 66   Resp 16     General Appearance:  Alert, no distress, appears stated age               Neurologic Exam:  Alert and oriented. Attention, concentration, memory, language, visual spatial orientation, executive function is normal.      Facial strength and sensation is normal. Hearing normal.  The tongue and uvula were midline. No dysarthria or dysphagia.   Strength was 5/5 in bulbar, axial + extremity muscles.There was normal bulk and tone with no abnormal movements.   The sensory examination was normal to touch, temperature and pain, vibration and proprioception. There was  no dysmetria or cerebellar signs.   The gait was narrow based. Reflexes were ++ and symmetric. Christensen sign was negative.          Problem List Items Addressed This Visit     Numbness - Primary    Current Assessment & Plan     The patient has numbness and paresthesias in his hands.  He has a combination of carpal tunnel syndrome and ulnar neuropathies at the elbows.  He is now wearing wrist splints at night and trying to avoid pressure at his elbows.  We discussed how his symptoms could continue to improve, remain the same or get worse.  While I understand he does not want an aggressive surgical intervention, he understands that he does not want to risk of permanent nerve damage.  For now we will take a wait and see approach and in the future additional diagnostics and treatments will depend on his clinical course.               It was a real pleasure treating Titus Ray today, thank you for allowing me to participate in the medical care. If you have any questions, please call me at any time. Titus Ray will follow up with me in the coming weeks to months and keep me updated by telephone. Titus Ray knows to notify me immediately if there is any change in the condition or if there are any new symptoms of transient or static neurologic dysfunction.    Aroldo Truong MD

## 2018-08-30 ENCOUNTER — TELEPHONE (OUTPATIENT)
Dept: PRIMARY CARE | Facility: CLINIC | Age: 63
End: 2018-08-30

## 2018-08-30 NOTE — TELEPHONE ENCOUNTER
Have patient call Dr. Dunaway's office and they should let us know what kind of referral they need to proceed with the colonoscopy.

## 2018-08-30 NOTE — TELEPHONE ENCOUNTER
I called patient I spoke with his wife Marisela I informed her that patient will have to call dr ordoñez's office and they should let us know what kind of referral they need to proceed with the colonoscopy.

## 2018-08-30 NOTE — TELEPHONE ENCOUNTER
----- Message from Titus Ray sent at 2018 10:56 AM EDT -----  Regarding: Referral Request  Contact: [phone]  This is a duplicate of a phone request, now that I figured out where to do this on line.    Requesting referral for colonoscopy with Dr. Dunaway ([facility] [id number]).  Scheduled for .    Assuming all the other info (insurance, phone, , etc .) is already here in the system.    Thanks! - Deric

## 2018-09-17 PROBLEM — K57.90 DIVERTICULOSIS: Chronic | Status: ACTIVE | Noted: 2018-09-17

## 2018-10-11 ENCOUNTER — HOSPITAL ENCOUNTER (EMERGENCY)
Facility: HOSPITAL | Age: 63
Discharge: HOME | End: 2018-10-12
Attending: EMERGENCY MEDICINE
Payer: COMMERCIAL

## 2018-10-11 DIAGNOSIS — T78.40XA ALLERGIC REACTION, INITIAL ENCOUNTER: Primary | ICD-10-CM

## 2018-10-11 PROCEDURE — 99284 EMERGENCY DEPT VISIT MOD MDM: CPT | Mod: 25

## 2018-10-12 VITALS
WEIGHT: 260 LBS | OXYGEN SATURATION: 98 % | DIASTOLIC BLOOD PRESSURE: 68 MMHG | SYSTOLIC BLOOD PRESSURE: 108 MMHG | TEMPERATURE: 98.8 F | BODY MASS INDEX: 37.22 KG/M2 | HEIGHT: 70 IN | HEART RATE: 77 BPM | RESPIRATION RATE: 18 BRPM

## 2018-10-12 LAB
ANION GAP SERPL CALC-SCNC: 11 MEQ/L (ref 3–15)
ATRIAL RATE: 76
BASOPHILS # BLD: 0.04 K/UL (ref 0.01–0.1)
BASOPHILS NFR BLD: 0.5 %
BUN SERPL-MCNC: 19 MG/DL (ref 8–20)
CALCIUM SERPL-MCNC: 8.7 MG/DL (ref 8.9–10.3)
CHLORIDE SERPL-SCNC: 102 MEQ/L (ref 98–109)
CO2 SERPL-SCNC: 24 MEQ/L (ref 22–32)
CREAT SERPL-MCNC: 1.1 MG/DL (ref 0.8–1.3)
DIFFERENTIAL METHOD BLD: NORMAL
EOSINOPHIL # BLD: 0.04 K/UL (ref 0.04–0.54)
EOSINOPHIL NFR BLD: 0.5 %
ERYTHROCYTE [DISTWIDTH] IN BLOOD BY AUTOMATED COUNT: 13.2 % (ref 11.6–14.4)
GFR SERPL CREATININE-BSD FRML MDRD: >60 ML/MIN/1.73M*2
GLUCOSE SERPL-MCNC: 112 MG/DL (ref 70–99)
HCT VFR BLDCO AUTO: 45.1 % (ref 40.1–51)
HGB BLD-MCNC: 14.9 G/DL (ref 13.7–17.5)
IMM GRANULOCYTES # BLD AUTO: 0.03 K/UL (ref 0–0.08)
IMM GRANULOCYTES NFR BLD AUTO: 0.4 %
LYMPHOCYTES # BLD: 3.43 K/UL (ref 1.2–3.5)
LYMPHOCYTES NFR BLD: 44 %
MCH RBC QN AUTO: 28.5 PG (ref 28–33.2)
MCHC RBC AUTO-ENTMCNC: 33 G/DL (ref 32.2–36.5)
MCV RBC AUTO: 86.2 FL (ref 83–98)
MONOCYTES # BLD: 0.49 K/UL (ref 0.3–1)
MONOCYTES NFR BLD: 6.3 %
NEUTROPHILS # BLD: 3.76 K/UL (ref 1.7–7)
NEUTS SEG NFR BLD: 48.3 %
NRBC BLD-RTO: 0 %
P AXIS: 21
PDW BLD AUTO: 10.2 FL (ref 9.4–12.4)
PLATELET # BLD AUTO: 273 K/UL (ref 150–350)
POTASSIUM SERPL-SCNC: 3.4 MEQ/L (ref 3.6–5.1)
PR INTERVAL: 202
QRS DURATION: 88
QT INTERVAL: 378
QTC CALCULATION(BAZETT): 425
R AXIS: 59
RBC # BLD AUTO: 5.23 M/UL (ref 4.5–5.8)
SODIUM SERPL-SCNC: 137 MEQ/L (ref 136–144)
T WAVE AXIS: 29
TROPONIN I SERPL-MCNC: <0.02 NG/ML
VENTRICULAR RATE: 76
WBC # BLD AUTO: 7.79 K/UL (ref 3.8–10.5)

## 2018-10-12 PROCEDURE — 63700000 HC SELF-ADMINISTRABLE DRUG: Performed by: EMERGENCY MEDICINE

## 2018-10-12 PROCEDURE — 36415 COLL VENOUS BLD VENIPUNCTURE: CPT | Performed by: EMERGENCY MEDICINE

## 2018-10-12 PROCEDURE — 93005 ELECTROCARDIOGRAM TRACING: CPT | Performed by: EMERGENCY MEDICINE

## 2018-10-12 PROCEDURE — 84484 ASSAY OF TROPONIN QUANT: CPT | Performed by: EMERGENCY MEDICINE

## 2018-10-12 PROCEDURE — 80048 BASIC METABOLIC PNL TOTAL CA: CPT | Performed by: EMERGENCY MEDICINE

## 2018-10-12 PROCEDURE — 85025 COMPLETE CBC W/AUTO DIFF WBC: CPT | Performed by: EMERGENCY MEDICINE

## 2018-10-12 RX ORDER — PREDNISONE 20 MG/1
60 TABLET ORAL ONCE
Status: COMPLETED | OUTPATIENT
Start: 2018-10-12 | End: 2018-10-12

## 2018-10-12 RX ORDER — PREDNISONE 20 MG/1
40 TABLET ORAL DAILY
Qty: 10 TABLET | Refills: 0 | Status: SHIPPED | OUTPATIENT
Start: 2018-10-12 | End: 2018-11-30 | Stop reason: ALTCHOICE

## 2018-10-12 RX ORDER — EPINEPHRINE 0.3 MG/.3ML
1 INJECTION SUBCUTANEOUS AS NEEDED
Qty: 1 SYRINGE | Refills: 0 | Status: SHIPPED | OUTPATIENT
Start: 2018-10-12 | End: 2022-10-10 | Stop reason: SDUPTHER

## 2018-10-12 RX ADMIN — PREDNISONE 60 MG: 20 TABLET ORAL at 01:12

## 2018-10-12 ASSESSMENT — ENCOUNTER SYMPTOMS
ALLERGIC REACTION: 1
VOICE CHANGE: 0
SHORTNESS OF BREATH: 1
COUGH: 0
NAUSEA: 1
ABDOMINAL PAIN: 0
VOMITING: 1
DIZZINESS: 1
FEVER: 0

## 2018-10-12 NOTE — ED PROVIDER NOTES
HPI     Chief Complaint   Patient presents with   • Allergic Reaction       C/o allergic reaction around 11pm. Pt was in his usual state of health, eating sunflower seeds and suddenly got sob, felt dizzy and lightheaded, sinus congestion, nausea, vomited and felt like he needed to have a bowel movement. Pt has h/o anaphylactic reaction and epipen. He says his symptoms were the same as prior allergic reaction. His epipen was  so he called ems. He has not had an allergic reaction to sunflower seeds in the past but has multiple food allergies. EMS gave benadryl, nausea meds and fluids with improvement in his symptoms. No h/o cad, no smoking or drugs.          History provided by:  Patient   used: No    Allergic Reaction   Presenting symptoms: no rash         Patient History     Past Medical History:   Diagnosis Date   • Allergic rhinitis     Controlled with zyrtec and Fluticasone nasal spray as needed. Worse in spring and fall.   • Attention deficit disorder (ADD) in adult     Managed on Straterra in the past but not effective.   • Carpal tunnel syndrome 2018    Neurologist: Dr. Truong. EMG in 2018 with bilateral median neuropathy with right severe and left mild to moderate. Being treated with wrist splints.   • Depression     Controlled with citalopram.   • Diverticulosis 2018    Seen in the sigmoid colon during colonoscopy in 2018 and no history of diverticuliitis.    • GERD (gastroesophageal reflux disease)     Managed on Omeprazole. Should take medication about 30 minutes prior to meal.  The patient should avoid caffeinated products. The patient should avoid carbonated beverages. The patient was instructed not to eat within 3 hours of bedtime. Pt instructed to avoid fatty meals. Suggested weight reduction.   • COURT on CPAP     Pulmonologist: Dr. Pulido. Managed with CPAP at 10 CM H2O   • Osteoarthritis of both knees     Orthopedic surgeon: Dr. Cheung. S/P left knee  hemiarthroplasty in 2007. X-rays in 12/2014 reveal bilateral small effusions, advanced DJD left knee and mild right knee DJD. MRI of right knee (3/2015) with severe medial compartment osteoarthrosis with subchondral fracture of medial femoral condyle with associated osteochondral fracture posteriorly. Has medial femoral condyle avascular necrosis.   • Polyp of colon 3/7/2018    Gastroenterologist: Dr. Dunaway. S/P colonoscopy with polypectomy in 2004. Colonoscopy with polypectomy x 7 (5 tubular adenomas and 2 hyperplastic polyps) on 11/5/2014. Colonoscopy in 09/2018 with polypectomy x 5 (Tubular adenoma, sessile serrated adenoma and hyperplastic polyp). Next due in 09/2023.        Past Surgical History:   Procedure Laterality Date   • BONE CYST EXCISION Left     Left humeral bone cyst excision   • VARICOCELECTOMY  1985   • VASECTOMY         Family History   Problem Relation Age of Onset   • Arthritis Mother    • COPD Father    • Arthritis Sister    • Arthritis Brother    • Schizophrenia Brother    • No Known Problems Daughter    • No Known Problems Daughter    • No Known Problems Sister    • No Known Problems Son        Social History   Substance Use Topics   • Smoking status: Never Smoker   • Smokeless tobacco: Never Used   • Alcohol use Yes      Comment: socially       Systems Reviewed from Nursing Triage:          Review of Systems     Review of Systems   Constitutional: Negative for fever.   HENT: Positive for congestion. Negative for voice change.         No lip or tongue swelling   Respiratory: Positive for shortness of breath. Negative for cough.    Cardiovascular: Negative for chest pain.   Gastrointestinal: Positive for nausea and vomiting. Negative for abdominal pain.   Skin: Negative for rash.   Neurological: Positive for dizziness. Negative for syncope.        Physical Exam     ED Triage Vitals [10/11/18 2355]   Temp Heart Rate Resp BP SpO2   36.2 °C (97.2 °F) 77 18 123/76 99 %      Temp Source Heart  "Rate Source Patient Position BP Location FiO2 (%) (Set)   Oral Monitor Lying Right upper arm --                     Patient Vitals for the past 24 hrs:   BP Temp Temp src Pulse Resp SpO2 Height Weight   10/11/18 2355 123/76 36.2 °C (97.2 °F) Oral 77 18 99 % 1.778 m (5' 10\") 118 kg (260 lb)           Physical Exam   Constitutional: He appears well-developed and well-nourished.   HENT:   Head: Normocephalic and atraumatic.   Eyes: Conjunctivae are normal.   Neck: Neck supple.   No lip tongue or post pharyngeal swelling. Normal voice. No stridor.    Cardiovascular: Normal rate and regular rhythm.    No murmur heard.  Pulmonary/Chest: Effort normal and breath sounds normal. No respiratory distress.   Abdominal: Soft. There is no tenderness.   Musculoskeletal: He exhibits no edema or tenderness.   Neurological: He is alert.   Skin: Skin is warm and dry.   Psychiatric: He has a normal mood and affect.   Nursing note and vitals reviewed.           Procedures    ED Course & MDM     Labs Reviewed - No data to display    No orders to display           MDM  Number of Diagnoses or Management Options  Allergic reaction, initial encounter:   Diagnosis management comments: Allergic reaction. Doubt acs. Will check trop ekg, tx steroids.        Amount and/or Complexity of Data Reviewed  Clinical lab tests: reviewed  Independent visualization of images, tracings, or specimens: yes      Labs Reviewed   BASIC METABOLIC PANEL - Abnormal        Result Value    Sodium 137      Potassium 3.4 (*)     Chloride 102      CO2 24      BUN 19      Creatinine 1.1      Glucose 112 (*)     Calcium 8.7 (*)     eGFR >60.0      Anion Gap 11     TROPONIN I - Normal    Troponin I <0.02     CBC - Normal    WBC 7.79      RBC 5.23      Hemoglobin 14.9      Hematocrit 45.1      MCV 86.2      MCH 28.5      MCHC 33.0      RDW 13.2      Platelets 273      MPV 10.2     CBC AND DIFFERENTIAL    Narrative:     The following orders were created for panel order CBC " and differential.  Procedure                               Abnormality         Status                     ---------                               -----------         ------                     CBC[[id number]]                           Normal              Final result               Diff Count[[id number]]                                        Final result                 Please view results for these tests on the individual orders.   DIFF COUNT    Differential Type Auto      nRBC 0.0      Immature Granulocytes 0.4      Neutrophils 48.3      Lymphocytes 44.0      Monocytes 6.3      Eosinophils 0.5      Basophils 0.5      Immature Granulocytes, Absolute 0.03      Neutrophils, Absolute 3.76      Lymphocytes, Absolute 3.43      Monocytes, Absolute 0.49      Eosinophils, Absolute 0.04      Basophils, Absolute 0.04            ED Course as of Oct 12 0250   Fri Oct 12, 2018   0246 Pt is asymptomatic. Discussed results and likely allergic reaction. Also discussed very low likelihood of acs. Heart score 2. Discussd follow up with pcp in the next day or 2 and return to the ER for any worsening symptoms or new symptoms such as cp. Will dc with steroids and epipen.   [KK]      ED Course User Index  [KK] Barrington Corcoran MD         Clinical Impressions as of Oct 12 0250   Allergic reaction, initial encounter        Barrington Corcorna MD  10/12/18 0110       Barrington Corcoran MD  10/12/18 0250

## 2018-10-16 ENCOUNTER — TELEPHONE (OUTPATIENT)
Dept: PRIMARY CARE | Facility: CLINIC | Age: 63
End: 2018-10-16

## 2018-11-30 ENCOUNTER — OFFICE VISIT (OUTPATIENT)
Dept: PRIMARY CARE | Facility: CLINIC | Age: 63
End: 2018-11-30
Payer: COMMERCIAL

## 2018-11-30 VITALS
BODY MASS INDEX: 37.59 KG/M2 | RESPIRATION RATE: 16 BRPM | HEART RATE: 76 BPM | DIASTOLIC BLOOD PRESSURE: 70 MMHG | TEMPERATURE: 97.7 F | OXYGEN SATURATION: 98 % | SYSTOLIC BLOOD PRESSURE: 108 MMHG | WEIGHT: 262 LBS

## 2018-11-30 DIAGNOSIS — J01.90 ACUTE NON-RECURRENT SINUSITIS, UNSPECIFIED LOCATION: Primary | ICD-10-CM

## 2018-11-30 PROBLEM — J02.9 SORETHROAT: Status: RESOLVED | Noted: 2018-06-07 | Resolved: 2018-11-30

## 2018-11-30 PROBLEM — R20.2 PARESTHESIA OF RIGHT ARM: Status: RESOLVED | Noted: 2018-03-12 | Resolved: 2018-11-30

## 2018-11-30 PROBLEM — M54.2 NECK PAIN: Status: RESOLVED | Noted: 2018-06-07 | Resolved: 2018-11-30

## 2018-11-30 PROBLEM — R20.0 NUMBNESS: Status: RESOLVED | Noted: 2018-04-19 | Resolved: 2018-11-30

## 2018-11-30 PROBLEM — R52 PAIN: Status: RESOLVED | Noted: 2018-04-19 | Resolved: 2018-11-30

## 2018-11-30 PROCEDURE — 99214 OFFICE O/P EST MOD 30 MIN: CPT | Mod: 25 | Performed by: FAMILY MEDICINE

## 2018-11-30 PROCEDURE — 90471 IMMUNIZATION ADMIN: CPT | Performed by: FAMILY MEDICINE

## 2018-11-30 PROCEDURE — 90670 PCV13 VACCINE IM: CPT | Performed by: FAMILY MEDICINE

## 2018-11-30 RX ORDER — AMOXICILLIN AND CLAVULANATE POTASSIUM 875; 125 MG/1; MG/1
1 TABLET, FILM COATED ORAL 2 TIMES DAILY
Qty: 14 TABLET | Refills: 0 | Status: SHIPPED | OUTPATIENT
Start: 2018-11-30 | End: 2019-10-16 | Stop reason: ALTCHOICE

## 2019-01-08 RX ORDER — CITALOPRAM 20 MG/1
TABLET, FILM COATED ORAL
Qty: 90 TABLET | Refills: 3 | Status: SHIPPED | OUTPATIENT
Start: 2019-01-08 | End: 2020-02-21 | Stop reason: SDUPTHER

## 2019-02-07 ENCOUNTER — TELEPHONE (OUTPATIENT)
Dept: PRIMARY CARE | Facility: CLINIC | Age: 64
End: 2019-02-07

## 2019-02-07 NOTE — TELEPHONE ENCOUNTER
----- Message from Titus Ray sent at 2/7/2019  9:11 AM EST -----  Regarding: Referral Request  Contact: [phone]  I would like to request a referral for a periodic ophthalmic exam with [facility] # [id number] ([employer]) at their [place] office on Feb 18.  They have recommended an exam every 6 months due to family history of glaucoma and a risk factor of a large optic nerve.      I can best be reached on my mobile phone at [phone].    Thank you!    - Deric

## 2019-02-25 ENCOUNTER — TELEPHONE (OUTPATIENT)
Dept: PRIMARY CARE | Facility: CLINIC | Age: 64
End: 2019-02-25

## 2019-02-25 NOTE — TELEPHONE ENCOUNTER
Referral Request   Received: 2 days ago   Message Contents   [facility]itus [facility] Clinical Support P   Phone Number: [phone]             My referral for chiropractic services has .  Can a new referral be issued.     Referral info:   NPI#  [id number]   Diagnostic Code  M54.5   Dr. Damion Nick   [facility] [place] [place]   [phone]     Patient Info:     Titus Dover) Cory HANLEY 1955   Insurance - [employer]   Cell phone-  [phone]   email - [email or web address]     Many thanks!     - Deric    Referral Request     Colette Scott MA Bennett, Irene M 26 minutes ago (7:24 AM)      Please help patient with referral request. (Routing comment)       Titus Douglas MD 2 days ago         My referral for chiropractic services has .  Can a new referral be issued.     Referral info:   NPI#  [id number]   Diagnostic Code  M54.5   Dr. Damion Nick   [facility] [place] [place]   [phone]     Patient Info:     Titus Dover) Cory HANLEY 1955

## 2019-04-26 ENCOUNTER — OFFICE VISIT (OUTPATIENT)
Dept: PRIMARY CARE | Facility: CLINIC | Age: 64
End: 2019-04-26
Payer: COMMERCIAL

## 2019-04-26 VITALS
WEIGHT: 250.8 LBS | BODY MASS INDEX: 35.9 KG/M2 | DIASTOLIC BLOOD PRESSURE: 74 MMHG | TEMPERATURE: 98.4 F | SYSTOLIC BLOOD PRESSURE: 118 MMHG | RESPIRATION RATE: 16 BRPM | HEIGHT: 70 IN | OXYGEN SATURATION: 98 % | HEART RATE: 74 BPM

## 2019-04-26 DIAGNOSIS — S02.2XXA CLOSED FRACTURE OF NASAL BONE, INITIAL ENCOUNTER: Primary | ICD-10-CM

## 2019-04-26 PROBLEM — J01.90 ACUTE NON-RECURRENT SINUSITIS: Status: RESOLVED | Noted: 2018-11-30 | Resolved: 2019-04-26

## 2019-04-26 PROCEDURE — 99213 OFFICE O/P EST LOW 20 MIN: CPT | Performed by: FAMILY MEDICINE

## 2019-04-26 ASSESSMENT — ENCOUNTER SYMPTOMS
FEVER: 0
COUGH: 0
EYE REDNESS: 0
PHOTOPHOBIA: 0
SHORTNESS OF BREATH: 0
WHEEZING: 0
CHILLS: 0
SORE THROAT: 0
RHINORRHEA: 0
FATIGUE: 0
CONFUSION: 0
HEADACHES: 0
CHEST TIGHTNESS: 0
SINUS PRESSURE: 0
SINUS PAIN: 0

## 2019-04-26 ASSESSMENT — ACTIVITIES OF DAILY LIVING (ADL)
ADEQUATE_TO_COMPLETE_ADL: YES
PATIENT'S MEMORY ADEQUATE TO SAFELY COMPLETE DAILY ACTIVITIES?: YES

## 2019-04-26 NOTE — ASSESSMENT & PLAN NOTE
Observe at this time. There is swelling. Will use ice as much as possible over initial 72 hours. Able to breathe through both nostrils. There may be slight deviation to left. Hard to tell for sure due to swelling. Try not to blow nose too hard to avoid breaking off clots. After 72 hours can try some nasal saline to clear secretions. Call if worsening. No ENT consultation needed at this time. No xrays needed.

## 2019-04-26 NOTE — PROGRESS NOTES
Tom Douglas MD    [facility]  [address], [address]  [place] [address]  Phone: [phone]  Fax: [phone]     History of Present Illness     Subjective     Patient ID: Titus Ray is a 63 y.o. male.    S/P accidental fall yesterday at gas station. Foot got caught in the pump hose. Fell directly on face. Hit face. Had bleeding from inside nose and bridge of nose. Controlled bleeding with pressure within 5-10 minutes. Has abrasions on external nose. Congested in nose. No history of broken nose. Here for evaluation. Able to use CPAP last night without problem. No loss of consciousness.          Past Medical/Surgical/Family/Social History       The following have been reviewed and updated as appropriate in this visit:  Allergies  Meds  Problems         Past Medical History:   Diagnosis Date   • Allergic rhinitis     Controlled with zyrtec and Fluticasone nasal spray as needed. Worse in spring and fall.   • Attention deficit disorder (ADD) in adult     Managed on Straterra in the past but not effective.   • Carpal tunnel syndrome 5/30/2018    Neurologist: Dr. Truong. EMG in 05/2018 with bilateral median neuropathy with right severe and left mild to moderate. Being treated with wrist splints.   • Depression     Controlled with citalopram.   • Diverticulosis 9/17/2018    Seen in the sigmoid colon during colonoscopy in 09/2018 and no history of diverticuliitis.    • GERD (gastroesophageal reflux disease)     Managed on Omeprazole. Should take medication about 30 minutes prior to meal.  The patient should avoid caffeinated products. The patient should avoid carbonated beverages. The patient was instructed not to eat within 3 hours of bedtime. Pt instructed to avoid fatty meals. Suggested weight reduction.   • COURT on CPAP     Pulmonologist: Dr. Pulido. Managed with CPAP at 10 CM H2O   • Osteoarthritis of both knees     Orthopedic surgeon: Dr. Cheung. S/P  left knee hemiarthroplasty in 2007. X-rays in 12/2014 reveal bilateral small effusions, advanced DJD left knee and mild right knee DJD. MRI of right knee (3/2015) with severe medial compartment osteoarthrosis with subchondral fracture of medial femoral condyle with associated osteochondral fracture posteriorly. Has medial femoral condyle avascular necrosis.   • Polyp of colon 3/7/2018    Gastroenterologist: Dr. Dunaway. S/P colonoscopy with polypectomy in 2004. Colonoscopy with polypectomy x 7 (5 tubular adenomas and 2 hyperplastic polyps) on 11/5/2014. Colonoscopy in 09/2018 with polypectomy x 5 (Tubular adenoma, sessile serrated adenoma and hyperplastic polyp). Next due in 09/2023.        Past Surgical History:   Procedure Laterality Date   • BONE CYST EXCISION Left     Left humeral bone cyst excision   • VARICOCELECTOMY  1985   • VASECTOMY         Family History   Problem Relation Age of Onset   • Arthritis Mother    • COPD Father    • Arthritis Sister    • Arthritis Brother    • Schizophrenia Brother    • No Known Problems Daughter    • No Known Problems Daughter    • No Known Problems Sister    • No Known Problems Son        Social History     Social History   • Marital status:      Spouse name: Marisela    • Number of children: 3   • Years of education: N/A     Occupational History   •       3 days week      Social History Main Topics   • Smoking status: Never Smoker   • Smokeless tobacco: Never Used   • Alcohol use Yes      Comment: socially   • Drug use: No   • Sexual activity: Not on file     Other Topics Concern   • Not on file     Social History Narrative   • No narrative on file        Allergies and Medications       Allergies   Allergen Reactions   • Cashew Nut    • Hydromorphone      Other reaction(s): Urticaria   • Golden Flavor      Other reaction(s):  shock, vasovagal syncope  mangos    • Pistachio Nut    • Sunflower Seed Angioedema           Outpatient Encounter Prescriptions  "as of 2019:   •  cetirizine 10 mg tablet, Take 10 mg by mouth daily.  •  cholecalciferol, vitamin D3, (VITAMIN D3) 2,000 unit tablet, Take 2,000 IU by mouth daily.  •  citalopram (celeXA) 20 mg tablet, TAKE 1 TABLET DAILY  •  EPINEPHrine (EPIPEN) 0.3 mg/0.3 mL injection syringe, Inject 0.3 mL (0.3 mg total) into the thigh as needed for anaphylaxis.  •  fluticasone (FLONASE) 50 mcg/actuation nasal spray, Administer 2 sprays into each nostril daily as needed.    •  multivitamin (THERAGRAN) tablet, Take 1 tablet by mouth daily.  •  omeprazole (PriLOSEC) 20 mg capsule, Take 20 mg by mouth every other day. Take 30 minutes prior to a meal.   •  [] amoxicillin-pot clavulanate (AUGMENTIN) 875-125 mg per tablet, Take 1 tablet by mouth 2 (two) times a day for 7 days.       Review of Systems       Review of Systems   Constitutional: Negative for chills, fatigue and fever.   HENT: Positive for nosebleeds. Negative for congestion, ear pain, rhinorrhea, sinus pain, sinus pressure, sneezing and sore throat.    Eyes: Negative for photophobia, redness and visual disturbance.   Respiratory: Negative for cough, chest tightness, shortness of breath and wheezing.    Cardiovascular: Negative for chest pain.   Neurological: Negative for headaches.   Psychiatric/Behavioral: Negative for confusion.        Physical Examination       Objective     Vitals:    19 0859   BP: 118/74   Pulse: 74   Resp: 16   Temp: 36.9 °C (98.4 °F)   SpO2: 98%       Pulse Readings from Last 5 Encounters:   19 74   18 76   10/11/18 77   18 66   18 72       Wt Readings from Last 5 Encounters:   19 114 kg (250 lb 12.8 oz)   18 119 kg (262 lb)   10/11/18 118 kg (260 lb)   18 127 kg (280 lb)   18 126 kg (277 lb 3.2 oz)       Ht Readings from Last 5 Encounters:   19 1.778 m (5' 10\")   10/11/18 1.778 m (5' 10\")   18 1.753 m (5' 9\")   18 1.765 m (5' 9.5\")       BP Readings from Last 5 " Encounters:   04/26/19 118/74   11/30/18 108/70   10/12/18 108/68   08/27/18 112/66   06/07/18 138/86       Physical Exam   Constitutional: He appears well-developed and well-nourished. He is cooperative.  Non-toxic appearance. No distress.   HENT:   Right Ear: Tympanic membrane, external ear and ear canal normal.   Left Ear: Tympanic membrane, external ear and ear canal normal.   Nose: Sinus tenderness present. No mucosal edema, rhinorrhea or nasal septal hematoma. Epistaxis is observed. Right sinus exhibits no maxillary sinus tenderness and no frontal sinus tenderness. Left sinus exhibits no maxillary sinus tenderness and no frontal sinus tenderness.       Mouth/Throat: Uvula is midline and mucous membranes are normal. No uvula swelling. No oropharyngeal exudate, posterior oropharyngeal edema or posterior oropharyngeal erythema. No tonsillar exudate.   Abrasions on external nosed. Blood in the left nares. Able to breathe through left nares. Mild deviation to left.   Eyes: Pupils are equal, round, and reactive to light. Lids are normal. Right eye exhibits no discharge and no exudate. Left eye exhibits no discharge and no exudate. Right conjunctiva is not injected. Left conjunctiva is not injected.   Neck: Normal range of motion. Neck supple. No thyroid mass and no thyromegaly present.   Cardiovascular: S1 normal and S2 normal.    Pulmonary/Chest: No accessory muscle usage or stridor. No tachypnea.   Neurological: He is alert.   Psychiatric: He has a normal mood and affect.        Laboratory Results     Lab Results   Component Value Date    WBC 7.79 10/12/2018    WBC 5.5 03/06/2018    WBC 5.4 04/05/2016    HGB 14.9 10/12/2018    HGB 14.6 03/06/2018    HGB 14.9 04/05/2016    HCT 45.1 10/12/2018    HCT 42.0 03/06/2018    HCT 45.1 04/05/2016    MCV 86.2 10/12/2018    MCV 84 03/06/2018    MCV 85 04/05/2016     10/12/2018     03/06/2018     04/05/2016        Lab Results   Component Value Date     GLUCOSE 112 (H) 10/12/2018    GLUCOSE 101 (H) 03/06/2018    GLUCOSE 96 04/05/2016    CALCIUM 8.7 (L) 10/12/2018    CALCIUM 8.4 (L) 01/21/2016    CALCIUM 9.5 01/05/2016     10/12/2018     03/06/2018     04/05/2016    K 3.4 (L) 10/12/2018    K 4.3 03/06/2018    K 4.8 04/05/2016    CO2 24 10/12/2018    CO2 22 03/06/2018    CO2 23 04/05/2016     10/12/2018     03/06/2018     04/05/2016    BUN 19 10/12/2018    BUN 20 03/06/2018    BUN 23 04/05/2016    CREATININE 1.1 10/12/2018    CREATININE 0.91 03/06/2018    CREATININE 1.32 (H) 04/05/2016    ALKPHOS 63 03/06/2018    ALKPHOS 72 04/05/2016    AST 23 03/06/2018    AST 16 04/05/2016    ALT 22 03/06/2018    ALT 17 04/05/2016    BILITOT 0.5 03/06/2018    BILITOT 0.5 04/05/2016    ALBUMIN 4.1 03/06/2018    ALBUMIN 4.2 04/05/2016       Lab Results   Component Value Date    CHOL 198 03/06/2018    CHOL 179 04/05/2016     Lab Results   Component Value Date    HDL 62 03/06/2018    HDL 53 04/05/2016     Lab Results   Component Value Date    LDLCALC 115 (H) 03/06/2018    LDLCALC 96 04/05/2016     Lab Results   Component Value Date    TRIG 103 03/06/2018    TRIG 152 (H) 04/05/2016       The 10-year ASCVD risk score (Kieferjasbir BEGUM Jr., et al., 2013) is: 9.8%    Values used to calculate the score:      Age: 63 years      Sex: Male      Is Non- : No      Diabetic: No      Tobacco smoker: No      Systolic Blood Pressure: 118 mmHg      Is BP treated: No      HDL Cholesterol: 62 mg/dL      Total Cholesterol: 198 mg/dL    Lab Results   Component Value Date    TSH 1.760 03/06/2018    TSH 1.620 04/05/2016       Lab Results   Component Value Date    HGBA1C 5.4 01/05/2016       Lab Results   Component Value Date    PSA 0.8 03/06/2018    PSA 0.7 04/05/2016       Lab Results   Component Value Date    HEPCAB <0.1 03/06/2018       Immunization History   Administered Date(s) Administered   • Influenza Split Preservative Free ID 12/19/2012   •  Influenza TIV (IM) 11/03/2010   • Influenza Vaccine Quadrivalent 3 Yr And Older 11/11/2015, 09/25/2018   • Influenza Vaccine Quadrivalent Preservative Free 6-35 Months 09/27/2013, 11/21/2014, 10/10/2016   • Influenza, Unspecified 11/21/2014, 10/10/2016, 11/06/2017   • Pneumococcal Conjugate 13-Valent 11/30/2018   • Tdap 03/03/2011   • Zoster 11/21/2014         Health Maintenance Topics with due status: Overdue       Topic Date Due    Zoster Vaccine 01/16/2015     Health Maintenance Topics with due status: Not Due       Topic Last Completion Date    DTaP, Tdap, and Td Vaccines 03/03/2011    Colonoscopy 09/14/2018    Pneumococcal (65 years and older) 11/30/2018    Annual Falls Risk Screening 04/26/2019     Health Maintenance Topics with due status: Completed / Addressed / Aged Out       Topic Last Completion Date    Hepatitis C Screening 03/06/2018    Influenza Vaccine 09/25/2018    Pneumococcal (0-64 years) 11/30/2018    Meningococcal ACWY Aged Out    Varicella Vaccines Aged Out    HIB Vaccines Aged Out    IPV Vaccines Aged Out    HPV Vaccines Aged Out        Assessment and Plan       Assessment/Plan     Problem List Items Addressed This Visit     Closed fracture of nasal bones - Primary    Current Assessment & Plan     Observe at this time. There is swelling. Will use ice as much as possible over initial 72 hours. Able to breathe through both nostrils. There may be slight deviation to left. Hard to tell for sure due to swelling. Try not to blow nose too hard to avoid breaking off clots. After 72 hours can try some nasal saline to clear secretions. Call if worsening. No ENT consultation needed at this time. No xrays needed.                Return if symptoms worsen or fail to improve.    No orders of the defined types were placed in this encounter.             Tom Douglas MD    4/26/2019

## 2019-04-26 NOTE — PATIENT INSTRUCTIONS
Problem List Items Addressed This Visit     Closed fracture of nasal bones - Primary     Observe at this time. There is swelling. Will use ice as much as possible over initial 72 hours. Able to breathe through both nostrils. There may be slight deviation to left. Hard to tell for sure due to swelling. Try not to blow nose too hard to avoid breaking off clots. After 72 hours can try some nasal saline to clear secretions. Call if worsening. No ENT consultation needed at this time. No xrays needed.

## 2019-06-28 ENCOUNTER — TELEPHONE (OUTPATIENT)
Dept: PRIMARY CARE | Facility: CLINIC | Age: 64
End: 2019-06-28

## 2019-06-28 NOTE — TELEPHONE ENCOUNTER
Referral Request   Received: Today   Message Contents   Titus HARO [facility]   Phone Number: [phone]             I have made an appointment on Monday, July 8 with Dr. Brady at his office at:     [facility] [address]     The NPI number is [id number]     This is a follow up on the pain in my wrist following a fall in late April which continues to be painful.  Dr. Douglas had approved this in an earlier message, but I do not have a diagnosis code.     I can be reached by cell phone at [phone] if there are any questions.     Thanks!     Deric Ray    Referral Request     Titus Douglas MD 2 minutes ago (1:52 PM)         I have made an appointment on Monday, July 8 with Dr. Brady at his office at:     [facility] [employer] [address]     The NPI number is [id number]     This is a follow up on the pain in my wrist following a fall in late April which continues to be painful.  Dr. Douglas had approved this in an earlier message, but I do not have a diagnosis code.     I can be reached by cell phone at [phone] if there are any questions.     Thanks!     Deric Ray

## 2019-09-03 ENCOUNTER — TELEPHONE (OUTPATIENT)
Dept: PRIMARY CARE | Facility: CLINIC | Age: 64
End: 2019-09-03

## 2019-09-03 NOTE — TELEPHONE ENCOUNTER
----- Message from Titus Ray sent at 2019 11:21 AM EDT -----  Regarding: Referral Request  Contact: [phone]  I would like to request an updated referral for continued chiropractic visits to Dr. Damion Carver of the [facility] in [place].  The NPI number is [id number] and the diagnosis code is M54.5.      I had a visit on  and found the previous referral had  so it would be ideal if the referral could be back dated to cover that visit.    Thank you!

## 2019-10-16 ENCOUNTER — OFFICE VISIT (OUTPATIENT)
Dept: PRIMARY CARE | Facility: CLINIC | Age: 64
End: 2019-10-16
Payer: COMMERCIAL

## 2019-10-16 ENCOUNTER — HOSPITAL ENCOUNTER (OUTPATIENT)
Dept: RADIOLOGY | Facility: CLINIC | Age: 64
Discharge: HOME | End: 2019-10-16
Attending: FAMILY MEDICINE
Payer: COMMERCIAL

## 2019-10-16 ENCOUNTER — DOCUMENTATION (OUTPATIENT)
Dept: PRIMARY CARE | Facility: CLINIC | Age: 64
End: 2019-10-16

## 2019-10-16 VITALS
OXYGEN SATURATION: 97 % | HEIGHT: 70 IN | HEART RATE: 68 BPM | SYSTOLIC BLOOD PRESSURE: 122 MMHG | RESPIRATION RATE: 16 BRPM | TEMPERATURE: 98.2 F | BODY MASS INDEX: 36.82 KG/M2 | WEIGHT: 257.2 LBS | DIASTOLIC BLOOD PRESSURE: 80 MMHG

## 2019-10-16 DIAGNOSIS — M25.561 ACUTE PAIN OF RIGHT KNEE: ICD-10-CM

## 2019-10-16 DIAGNOSIS — M25.561 ACUTE PAIN OF RIGHT KNEE: Primary | ICD-10-CM

## 2019-10-16 PROCEDURE — 99214 OFFICE O/P EST MOD 30 MIN: CPT | Mod: 25 | Performed by: FAMILY MEDICINE

## 2019-10-16 PROCEDURE — 90471 IMMUNIZATION ADMIN: CPT | Performed by: FAMILY MEDICINE

## 2019-10-16 PROCEDURE — 73562 X-RAY EXAM OF KNEE 3: CPT | Mod: RT

## 2019-10-16 PROCEDURE — 90653 IIV ADJUVANT VACCINE IM: CPT | Performed by: FAMILY MEDICINE

## 2019-10-16 RX ORDER — CITALOPRAM 10 MG/1
10 TABLET ORAL DAILY
Qty: 90 TABLET | Refills: 3 | Status: SHIPPED | OUTPATIENT
Start: 2019-10-16 | End: 2021-01-26

## 2019-10-16 ASSESSMENT — ENCOUNTER SYMPTOMS
TINGLING: 0
BACK PAIN: 0
NUMBNESS: 0
WEAKNESS: 0
JOINT SWELLING: 1
CHILLS: 0
MUSCLE WEAKNESS: 0
MYALGIAS: 0
NECK STIFFNESS: 0
WOUND: 0
NECK PAIN: 0
INABILITY TO BEAR WEIGHT: 0
ARTHRALGIAS: 0
FEVER: 0

## 2019-10-16 NOTE — ASSESSMENT & PLAN NOTE
Acute injury to knee. S/P total knee replacement. Has effusion and some laxity of the knee. Will check xray of knee. Refer to orthopedic surgeon. Take Aleve over-the-counter 2 tablets 2 times per day with food.

## 2019-10-16 NOTE — PATIENT INSTRUCTIONS
Problem List Items Addressed This Visit     Acute pain of right knee - Primary     Acute injury to knee. S/P total knee replacement. Has effusion and some laxity of the knee. Will check xray of knee. Refer to orthopedic surgeon. Take Aleve over-the-counter 2 tablets 2 times per day with food.         Relevant Orders    X-RAY KNEE RIGHT 3 VIEWS

## 2019-10-16 NOTE — PROGRESS NOTES
Tmo Douglas MD    [facility]  [address], [address]  [place] [address]  Phone: [phone]  Fax: [phone]     History of Present Illness     Subjective     Patient ID: Titus Ray is a 64 y.o. male.    Knee Pain    Injury mechanism: Was doing lunges 2 days ago and felt sudden sharp sever pain. The pain is present in the right knee. The quality of the pain is described as aching. Pertinent negatives include no inability to bear weight, muscle weakness, numbness or tingling. Associated symptoms comments: swelling. The symptoms are aggravated by movement and weight bearing. He has tried nothing for the symptoms.        Past Medical/Surgical/Family/Social History       The following have been reviewed and updated as appropriate in this visit:  Allergies  Meds  Problems         Past Medical History:   Diagnosis Date   • Allergic rhinitis     Controlled with zyrtec and Fluticasone nasal spray as needed. Worse in spring and fall.   • Attention deficit disorder (ADD) in adult     Managed on Straterra in the past but not effective.   • Carpal tunnel syndrome 5/30/2018    Neurologist: Dr. Truong. EMG in 05/2018 with bilateral median neuropathy with right severe and left mild to moderate. Being treated with wrist splints.   • Depression     Controlled with citalopram.   • Diverticulosis 9/17/2018    Seen in the sigmoid colon during colonoscopy in 09/2018 and no history of diverticuliitis.    • GERD (gastroesophageal reflux disease)     Managed on Omeprazole. Should take medication about 30 minutes prior to meal.  The patient should avoid caffeinated products. The patient should avoid carbonated beverages. The patient was instructed not to eat within 3 hours of bedtime. Pt instructed to avoid fatty meals. Suggested weight reduction.   • COURT on CPAP     Pulmonologist: Dr. Pulido. Managed with CPAP at 10 CM H2O   • Osteoarthritis of both knees     Orthopedic  surgeon: Dr. Cheung. S/P left knee hemiarthroplasty in 2007. X-rays in 12/2014 reveal bilateral small effusions, advanced DJD left knee and mild right knee DJD. MRI of right knee (3/2015) with severe medial compartment osteoarthrosis with subchondral fracture of medial femoral condyle with associated osteochondral fracture posteriorly. Has medial femoral condyle avascular necrosis.   • Polyp of colon 3/7/2018    Gastroenterologist: Dr. Dunaway. S/P colonoscopy with polypectomy in 2004. Colonoscopy with polypectomy x 7 (5 tubular adenomas and 2 hyperplastic polyps) on 11/5/2014. Colonoscopy in 09/2018 with polypectomy x 5 (Tubular adenoma, sessile serrated adenoma and hyperplastic polyp). Next due in 09/2023.        Past Surgical History:   Procedure Laterality Date   • BONE CYST EXCISION Left     Left humeral bone cyst excision   • VARICOCELECTOMY  1985   • VASECTOMY         Family History   Problem Relation Age of Onset   • Arthritis Biological Mother    • COPD Biological Father    • Arthritis Biological Sister    • Arthritis Biological Brother    • Schizophrenia Biological Brother    • No Known Problems Biological Daughter    • No Known Problems Biological Daughter    • No Known Problems Biological Sister    • No Known Problems Biological Son        Social History     Socioeconomic History   • Marital status:      Spouse name: Marisela    • Number of children: 3   • Years of education: Not on file   • Highest education level: Not on file   Occupational History   • Occupation:      Comment: 3 days week    Social Needs   • Financial resource strain: Not on file   • Food insecurity:     Worry: Not on file     Inability: Not on file   • Transportation needs:     Medical: Not on file     Non-medical: Not on file   Tobacco Use   • Smoking status: Never Smoker   • Smokeless tobacco: Never Used   Substance and Sexual Activity   • Alcohol use: Yes     Comment: socially   • Drug use: No   •  Sexual activity: Not on file   Lifestyle   • Physical activity:     Days per week: Not on file     Minutes per session: Not on file   • Stress: Not on file   Relationships   • Social connections:     Talks on phone: Not on file     Gets together: Not on file     Attends [employer] service: Not on file     Active member of club or organization: Not on file     Attends meetings of clubs or organizations: Not on file     Relationship status: Not on file   • Intimate partner violence:     Fear of current or ex partner: Not on file     Emotionally abused: Not on file     Physically abused: Not on file     Forced sexual activity: Not on file   Other Topics Concern   • Not on file   Social History Narrative   • Not on file       Patient Care Team:  Tom Douglas MD as PCP - General       Allergies and Medications       Allergies   Allergen Reactions   • Cashew Nut    • Hydromorphone      Other reaction(s): Urticaria   • Golden Flavor      Other reaction(s):  shock, vasovagal syncope  mangos    • Pistachio Nut    • Sunflower Seed Angioedema         Current Outpatient Medications   Medication Sig Dispense Refill   • cetirizine 10 mg tablet Take 10 mg by mouth daily.     • cholecalciferol, vitamin D3, (VITAMIN D3) 2,000 unit tablet Take 2,000 IU by mouth daily.     • citalopram (celeXA) 20 mg tablet TAKE 1 TABLET DAILY 90 tablet 3   • EPINEPHrine (EPIPEN) 0.3 mg/0.3 mL injection syringe Inject 0.3 mL (0.3 mg total) into the thigh as needed for anaphylaxis. 1 Syringe 0   • fluticasone (FLONASE) 50 mcg/actuation nasal spray Administer 2 sprays into each nostril daily as needed.       • multivitamin (THERAGRAN) tablet Take 1 tablet by mouth daily.     • omeprazole (PriLOSEC) 20 mg capsule Take 20 mg by mouth every other day. Take 30 minutes prior to a meal.      • citalopram (celeXA) 10 mg tablet Take 1 tablet (10 mg total) by mouth daily. 90 tablet 3     No current facility-administered medications for this visit.          "  Review of Systems       Review of Systems   Constitutional: Negative for chills and fever.   Musculoskeletal: Positive for gait problem and joint swelling. Negative for arthralgias, back pain, myalgias, neck pain and neck stiffness.   Skin: Negative for wound.   Neurological: Negative for tingling, weakness and numbness.        Physical Examination       Objective     Vitals:    10/16/19 1452   BP: 122/80   Pulse: 68   Resp: 16   Temp: 36.8 °C (98.2 °F)   SpO2: 97%       Pulse Readings from Last 5 Encounters:   10/16/19 68   04/26/19 74   11/30/18 76   10/11/18 77   08/27/18 66       Wt Readings from Last 5 Encounters:   10/16/19 117 kg (257 lb 3.2 oz)   04/26/19 114 kg (250 lb 12.8 oz)   11/30/18 119 kg (262 lb)   10/11/18 118 kg (260 lb)   06/07/18 127 kg (280 lb)       Ht Readings from Last 5 Encounters:   10/16/19 1.778 m (5' 10\")   04/26/19 1.778 m (5' 10\")   10/11/18 1.778 m (5' 10\")   06/07/18 1.753 m (5' 9\")   03/12/18 1.765 m (5' 9.5\")       BP Readings from Last 5 Encounters:   10/16/19 122/80   04/26/19 118/74   11/30/18 108/70   10/12/18 108/68   08/27/18 112/66       Physical Exam   Constitutional: He appears well-developed and well-nourished.   Musculoskeletal:        Right knee: He exhibits swelling and effusion. He exhibits no ecchymosis and no erythema. Tenderness found. Medial joint line tenderness noted.   Laxity of joint.   Neurological: He is alert.   Psychiatric: He has a normal mood and affect.        Laboratory Results     Lab Results   Component Value Date    WBC 7.79 10/12/2018    WBC 5.5 03/06/2018    WBC 5.4 04/05/2016    HGB 14.9 10/12/2018    HGB 14.6 03/06/2018    HGB 14.9 04/05/2016    HCT 45.1 10/12/2018    HCT 42.0 03/06/2018    HCT 45.1 04/05/2016    MCV 86.2 10/12/2018    MCV 84 03/06/2018    MCV 85 04/05/2016     10/12/2018     03/06/2018     04/05/2016        Lab Results   Component Value Date    GLUCOSE 112 (H) 10/12/2018    GLUCOSE 101 (H) 03/06/2018    " GLUCOSE 96 04/05/2016    CALCIUM 8.7 (L) 10/12/2018    CALCIUM 8.4 (L) 01/21/2016    CALCIUM 9.5 01/05/2016     10/12/2018     03/06/2018     04/05/2016    K 3.4 (L) 10/12/2018    K 4.3 03/06/2018    K 4.8 04/05/2016    CO2 24 10/12/2018    CO2 22 03/06/2018    CO2 23 04/05/2016     10/12/2018     03/06/2018     04/05/2016    BUN 19 10/12/2018    BUN 20 03/06/2018    BUN 23 04/05/2016    CREATININE 1.1 10/12/2018    CREATININE 0.91 03/06/2018    CREATININE 1.32 (H) 04/05/2016    ALKPHOS 63 03/06/2018    ALKPHOS 72 04/05/2016    AST 23 03/06/2018    AST 16 04/05/2016    ALT 22 03/06/2018    ALT 17 04/05/2016    BILITOT 0.5 03/06/2018    BILITOT 0.5 04/05/2016    ALBUMIN 4.1 03/06/2018    ALBUMIN 4.2 04/05/2016       Lab Results   Component Value Date    CHOL 198 03/06/2018    CHOL 179 04/05/2016     Lab Results   Component Value Date    HDL 62 03/06/2018    HDL 53 04/05/2016     Lab Results   Component Value Date    LDLCALC 115 (H) 03/06/2018    LDLCALC 96 04/05/2016     Lab Results   Component Value Date    TRIG 103 03/06/2018    TRIG 152 (H) 04/05/2016       The 10-year ASCVD risk score (Yungjasbir BEGUM Jr., et al., 2013) is: 11.2%    Values used to calculate the score:      Age: 64 years      Sex: Male      Is Non- : No      Diabetic: No      Tobacco smoker: No      Systolic Blood Pressure: 122 mmHg      Is BP treated: No      HDL Cholesterol: 62 mg/dL      Total Cholesterol: 198 mg/dL    Lab Results   Component Value Date    TSH 1.760 03/06/2018    TSH 1.620 04/05/2016       Lab Results   Component Value Date    HGBA1C 5.4 01/05/2016       No results found for: MICROALBCREA    Lab Results   Component Value Date    PSA 0.8 03/06/2018    PSA 0.7 04/05/2016       Lab Results   Component Value Date    HEPCAB <0.1 03/06/2018         Immunization History   Administered Date(s) Administered   • Influenza Split Preservative Free ID 12/19/2012   • Influenza TIV (IM)  11/03/2010   • Influenza Vaccine Quadrivalent 3 Yr And Older 11/11/2015, 09/25/2018   • Influenza Vaccine Quadrivalent Preservative Free 6-35 Months 09/27/2013, 11/21/2014, 10/10/2016   • Influenza, Unspecified 11/21/2014, 10/10/2016, 11/06/2017   • Pneumococcal Conjugate 13-Valent 11/30/2018   • Tdap 03/03/2011   • Zoster 11/21/2014         Health Maintenance Topics with due status: Overdue       Topic Date Due    Zoster Vaccine 01/16/2015    Influenza Vaccine 08/01/2019     Health Maintenance Topics with due status: Not Due       Topic Last Completion Date    DTaP, Tdap, and Td Vaccines 03/03/2011    Colonoscopy 09/14/2018    Pneumococcal (65 years and older) 11/30/2018    Annual Falls Risk Screening 04/26/2019     Health Maintenance Topics with due status: Completed       Topic Last Completion Date    Hepatitis C Screening 03/06/2018     Health Maintenance Topics with due status: Aged Out       Topic Date Due    Meningococcal ACWY Aged Out    Varicella Vaccines Aged Out    HIB Vaccines Aged Out    IPV Vaccines Aged Out    HPV Vaccines Aged Out    Pneumococcal Aged Out        Assessment and Plan       Assessment/Plan     Problem List Items Addressed This Visit     Acute pain of right knee - Primary    Current Assessment & Plan     Acute injury to knee. S/P total knee replacement. Has effusion and some laxity of the knee. Will check xray of knee. Refer to orthopedic surgeon. Take Aleve over-the-counter 2 tablets 2 times per day with food.         Relevant Orders    X-RAY KNEE RIGHT 3 VIEWS          Return if symptoms worsen or fail to improve.    Orders Placed This Encounter   Procedures   • X-RAY KNEE RIGHT 3 VIEWS     Standing Status:   Future     Standing Expiration Date:   10/16/2020   • Influenza vaccine 65 and older IM preservative free              Tom Douglas MD    10/16/2019

## 2019-10-16 NOTE — PROGRESS NOTES
Patient notified that x-ray was normal.  Patient referred to orthopedic for further evaluation due to his swelling of his knee and effusion.

## 2019-10-17 ENCOUNTER — TELEPHONE (OUTPATIENT)
Dept: PRIMARY CARE | Facility: CLINIC | Age: 64
End: 2019-10-17

## 2019-10-17 NOTE — TELEPHONE ENCOUNTER
----- Message from Titus Ray sent at 10/16/2019  6:16 PM EDT -----  Regarding: Visit Follow-Up Question  Contact: [phone]  Dr. Singh, who performed my knee repacement, appears to have reduced his practice to handle only trauma and emergency patients and those on a very limited schedule.  Can you recommend another orthpaedic surgeon in the [facility] who would be appropriate to take a look at the issue with my right knee?    Many thanks! - Deric

## 2019-10-17 NOTE — TELEPHONE ENCOUNTER
· I left a detailed message on patient's cell Dr Harman and phone number [phone].   · Patient called back to confirm the message that he is waiting to hear back from Dr Harman's office he has multiple offices.

## 2019-10-18 ENCOUNTER — TELEPHONE (OUTPATIENT)
Dept: PRIMARY CARE | Facility: CLINIC | Age: 64
End: 2019-10-18

## 2019-10-18 NOTE — TELEPHONE ENCOUNTER
----- Message from Titus Ray sent at 10/18/2019 10:30 AM EDT -----  Regarding: Referral Request  Contact: [phone]  Have made appointment with Dr. Jean Harman at 1PM Monday, 10/28 in his [place] office and will need a referral as follow up to knee exam/xray earlier this week.    Thank you!  · Rebecca if you have time can you do this Thanks

## 2019-11-04 ENCOUNTER — TRANSCRIBE ORDERS (OUTPATIENT)
Dept: SCHEDULING | Age: 64
End: 2019-11-04

## 2019-11-04 DIAGNOSIS — M25.561 PAIN IN RIGHT KNEE: Primary | ICD-10-CM

## 2019-11-08 ENCOUNTER — HOSPITAL ENCOUNTER (OUTPATIENT)
Dept: RADIOLOGY | Facility: HOSPITAL | Age: 64
Setting detail: NUCLEAR MEDICINE
Discharge: HOME | End: 2019-11-08
Attending: ORTHOPAEDIC SURGERY
Payer: COMMERCIAL

## 2019-11-08 DIAGNOSIS — M25.561 PAIN IN RIGHT KNEE: ICD-10-CM

## 2019-11-08 PROCEDURE — A9503 TC99M MEDRONATE: HCPCS | Performed by: ORTHOPAEDIC SURGERY

## 2019-11-08 PROCEDURE — 78315 BONE IMAGING 3 PHASE: CPT

## 2019-11-08 RX ADMIN — TECHNETIUM TC 99M MEDRONATE 27.5 MILLICURIE: 25 INJECTION, POWDER, FOR SOLUTION INTRAVENOUS at 07:30

## 2019-12-26 ENCOUNTER — TELEPHONE (OUTPATIENT)
Dept: PRIMARY CARE | Facility: CLINIC | Age: 64
End: 2019-12-26

## 2019-12-26 NOTE — TELEPHONE ENCOUNTER
----- Message from Titus Ray sent at 12/26/2019  2:40 PM EST -----  Regarding: Referral Request  Contact: [phone]  I need a reissued referral for [facility] for periodic chiropractic lower back care.  Here is the info the provider asked I use for the referral:    NPI #: [id number]  Diagnosis Code: M54.5  DATED TO: 12/3/19    Thank you!

## 2019-12-26 NOTE — TELEPHONE ENCOUNTER
Shagufta, I added the referral for the patient and left vm for the patient to let them know it was added.

## 2020-01-02 ENCOUNTER — TELEPHONE (OUTPATIENT)
Dept: PRIMARY CARE | Facility: CLINIC | Age: 65
End: 2020-01-02

## 2020-01-02 NOTE — TELEPHONE ENCOUNTER
Shagufta, I added the patient's referral and called the patient to let him know it was added for his appt with Dr David Wen in [place].

## 2020-01-02 NOTE — TELEPHONE ENCOUNTER
----- Message from Titus Ray sent at 1/2/2020 12:43 PM EST -----  Regarding: Referral Request  Contact: [phone]  Have presurgical cardiac clearance with Dr. Wen in [place].    NPI [id number]  Diagnosis Z13.6    Appointment Monday 1/6.    Thank you!

## 2020-01-09 ENCOUNTER — DOCUMENTATION (OUTPATIENT)
Dept: PRIMARY CARE | Facility: CLINIC | Age: 65
End: 2020-01-09

## 2020-01-09 RX ORDER — AMOXICILLIN AND CLAVULANATE POTASSIUM 875; 125 MG/1; MG/1
1 TABLET, FILM COATED ORAL 2 TIMES DAILY
Qty: 14 TABLET | Refills: 0 | Status: SHIPPED | OUTPATIENT
Start: 2020-01-09 | End: 2020-01-16

## 2020-01-09 NOTE — H&P (VIEW-ONLY)
Patient with persistent cold symptoms over 6 days.  Not improving with over-the-counter medications.  Patient is upcoming surgery in 2 to 3 weeks.  Will treat with Augmentin 1 tablet twice a day for 7 days.  If patient does not improve he should call for further instructions.

## 2020-01-13 ENCOUNTER — TELEPHONE (OUTPATIENT)
Dept: PRIMARY CARE | Facility: CLINIC | Age: 65
End: 2020-01-13

## 2020-01-13 NOTE — TELEPHONE ENCOUNTER
----- Message from Titus Ray sent at 1/13/2020  1:37 PM EST -----  Regarding: Referral Request  Contact: [phone]  In preperation for right knee revision surgery, I have an echo-cardiogram scheduled for Thursday, Jan 23 with Dr. David Wen of [facility] at [address],  [address], [place]  [address].    I'm suspecting that I will need a referral for this.  Is this enough information to generate one?    Many thanks! - Deric

## 2020-01-16 ENCOUNTER — HOSPITAL ENCOUNTER (OUTPATIENT)
Dept: CARDIOLOGY | Facility: HOSPITAL | Age: 65
Discharge: HOME | End: 2020-01-16
Attending: ORTHOPAEDIC SURGERY
Payer: COMMERCIAL

## 2020-01-16 ENCOUNTER — APPOINTMENT (OUTPATIENT)
Dept: PREADMISSION TESTING | Facility: HOSPITAL | Age: 65
End: 2020-01-16
Attending: ORTHOPAEDIC SURGERY
Payer: COMMERCIAL

## 2020-01-16 ENCOUNTER — APPOINTMENT (OUTPATIENT)
Dept: LAB | Facility: HOSPITAL | Age: 65
End: 2020-01-16
Attending: ORTHOPAEDIC SURGERY
Payer: COMMERCIAL

## 2020-01-16 ENCOUNTER — TRANSCRIBE ORDERS (OUTPATIENT)
Dept: LAB | Facility: HOSPITAL | Age: 65
End: 2020-01-16

## 2020-01-16 VITALS
HEART RATE: 78 BPM | SYSTOLIC BLOOD PRESSURE: 96 MMHG | BODY MASS INDEX: 36.73 KG/M2 | DIASTOLIC BLOOD PRESSURE: 60 MMHG | HEIGHT: 70 IN | OXYGEN SATURATION: 97 % | WEIGHT: 256.6 LBS | TEMPERATURE: 97 F | RESPIRATION RATE: 20 BRPM

## 2020-01-16 DIAGNOSIS — J00 HEAD COLD: ICD-10-CM

## 2020-01-16 DIAGNOSIS — Z01.818 PREOP EXAMINATION: Primary | ICD-10-CM

## 2020-01-16 DIAGNOSIS — T84.84XA PAIN DUE TO INTERNAL ORTHOPEDIC PROSTHETIC DEVICES, IMPLANTS AND GRAFTS, INITIAL ENCOUNTER (CMS/HCC): ICD-10-CM

## 2020-01-16 DIAGNOSIS — G47.33 OSA (OBSTRUCTIVE SLEEP APNEA): ICD-10-CM

## 2020-01-16 DIAGNOSIS — T84.84XA PAIN DUE TO INTERNAL ORTHOPEDIC PROSTHETIC DEVICES, IMPLANTS AND GRAFTS, INITIAL ENCOUNTER (CMS/HCC): Primary | ICD-10-CM

## 2020-01-16 DIAGNOSIS — Z01.818 ENCOUNTER FOR OTHER PREPROCEDURAL EXAMINATION: ICD-10-CM

## 2020-01-16 LAB
ABO + RH BLD: NORMAL
ANION GAP SERPL CALC-SCNC: 10 MEQ/L (ref 3–15)
ATRIAL RATE: 60
BLD GP AB SCN SERPL QL: NEGATIVE
BLOOD BANK CMNT PATIENT-IMP: NORMAL
BLOOD BANK COMMENT 2: NORMAL
BUN SERPL-MCNC: 18 MG/DL (ref 8–20)
CALCIUM SERPL-MCNC: 9.2 MG/DL (ref 8.9–10.3)
CHLORIDE SERPL-SCNC: 100 MEQ/L (ref 98–109)
CO2 SERPL-SCNC: 25 MEQ/L (ref 22–32)
CREAT SERPL-MCNC: 0.8 MG/DL
D AG BLD QL: POSITIVE
ERYTHROCYTE [DISTWIDTH] IN BLOOD BY AUTOMATED COUNT: 13.4 % (ref 11.6–14.4)
GFR SERPL CREATININE-BSD FRML MDRD: >60 ML/MIN/1.73M*2
GLUCOSE SERPL-MCNC: 97 MG/DL (ref 70–99)
HCT VFR BLDCO AUTO: 41.8 % (ref 40.1–51)
HGB BLD-MCNC: 14.2 G/DL
LABORATORY COMMENT REPORT: NORMAL
MCH RBC QN AUTO: 29.4 PG (ref 28–33.2)
MCHC RBC AUTO-ENTMCNC: 34 G/DL (ref 32.2–36.5)
MCV RBC AUTO: 86.5 FL (ref 83–98)
P AXIS: 12
PDW BLD AUTO: 10 FL (ref 9.4–12.4)
PLATELET # BLD AUTO: 250 K/UL
POTASSIUM SERPL-SCNC: 4.5 MEQ/L (ref 3.6–5.1)
PR INTERVAL: 190
QRS DURATION: 88
QT INTERVAL: 416
QTC CALCULATION(BAZETT): 416
R AXIS: 53
RBC # BLD AUTO: 4.83 M/UL (ref 4.5–5.8)
SODIUM SERPL-SCNC: 135 MEQ/L (ref 136–144)
T WAVE AXIS: 49
VENTRICULAR RATE: 60
WBC # BLD AUTO: 6.23 K/UL

## 2020-01-16 PROCEDURE — 99244 OFF/OP CNSLTJ NEW/EST MOD 40: CPT | Performed by: HOSPITALIST

## 2020-01-16 PROCEDURE — 36415 COLL VENOUS BLD VENIPUNCTURE: CPT

## 2020-01-16 PROCEDURE — 80048 BASIC METABOLIC PNL TOTAL CA: CPT

## 2020-01-16 PROCEDURE — 93005 ELECTROCARDIOGRAM TRACING: CPT

## 2020-01-16 PROCEDURE — 85027 COMPLETE CBC AUTOMATED: CPT

## 2020-01-16 PROCEDURE — 86900 BLOOD TYPING SEROLOGIC ABO: CPT

## 2020-01-16 RX ORDER — B-COMPLEX WITH VITAMIN C
1 TABLET ORAL DAILY
COMMUNITY

## 2020-01-16 SDOH — HEALTH STABILITY: MENTAL HEALTH: HOW OFTEN DO YOU HAVE A DRINK CONTAINING ALCOHOL?: 4 OR MORE TIMES A WEEK

## 2020-01-16 SDOH — HEALTH STABILITY: MENTAL HEALTH: HOW OFTEN DO YOU HAVE SIX OR MORE DRINKS ON ONE OCCASION?: NEVER

## 2020-01-16 SDOH — HEALTH STABILITY: MENTAL HEALTH: HOW MANY DRINKS CONTAINING ALCOHOL DO YOU HAVE ON A TYPICAL DAY WHEN YOU ARE DRINKING?: 1 OR 2

## 2020-01-16 ASSESSMENT — PAIN SCALES - GENERAL: PAINLEVEL: 7

## 2020-01-16 NOTE — ASSESSMENT & PLAN NOTE
Pt sched for Knee Total Revision  Pt has no cardiac symptoms and ekg is nonischemic. Pt exhibits no signs/symptoms of angina, arrythmia or decompenstated CHF. Pt is sched to get 2d echo before obtaining a final cv clearance from dr. Wen. Will need to obtain cardiac clearance letter before proceeding w surgery  Pt's labs are at an acceptable level to proceed w surgery.

## 2020-01-16 NOTE — ASSESSMENT & PLAN NOTE
Pt reports he is finishing course of augmentin that was rx by his PCP and feels symptoms have resolved.

## 2020-01-16 NOTE — CONSULTS
[facility] Medicine Service -  Pre-Operative Consultation       Patient Name: Titus Ray  Referring Surgeon: dr. constantino    Reason for Referral: Pre-Operative Evaluation  Surgical Procedure: Knee Total Revision  Operative Date: 1/28/20  Other Providers:      PCP: Tom Douglas MD        HISTORY OF PRESENT ILLNESS      Titus Ray is a 64 y.o. male presenting today to the [facility] at [facility] for pre-operative evaluation prior to planned surgery.  Pt has had knee issues chronically and had undergone surgery of his knee in the past. This past September, pt was doing a lunge when he injured his knee. Pt was referred to see dr. constantino - orthopedics. After reviewing imaging studies and d/w dr. Constantino, pt has now opted for surgical intervention.     The patient denies any current chest pain or pressure, dyspnea, cough, sputum, fevers, chills, abdominal pain, nausea, vomiting, diarrhea, urinary complaints, dizzy, lightheaded, blood in stool or other symptoms.     Pt reports he recently experienced a head cold and is finishing course of augmentin that was rx by his PCP and feels symptoms have resolved.     No history of MI, arrhythmia,or CHF.  + history of COURT.  No history of DVT/PE.  No history of COPD.  No history of CVA.  No history of DM.   No history of CKD.     PAST MEDICAL AND SURGICAL HISTORY      Past Medical History:   Diagnosis Date   • Allergic rhinitis     Controlled with zyrtec and Fluticasone nasal spray as needed. Worse in spring and fall.   • Attention deficit disorder (ADD) in adult     Managed on Straterra in the past but not effective.   • Carpal tunnel syndrome 5/30/2018    Neurologist: Dr. Truong. EMG in 05/2018 with bilateral median neuropathy with right severe and left mild to moderate. Being treated with wrist splints.   • Complaint of nasal congestion     Dr Douglas prescribed Augmentin last day 1/16/20   • Depression      Controlled with citalopram.   • Diverticulosis 9/17/2018    Seen in the sigmoid colon during colonoscopy in 09/2018 and no history of diverticuliitis.    • GERD (gastroesophageal reflux disease)     Managed on Omeprazole. Should take medication about 30 minutes prior to meal.  The patient should avoid caffeinated products. The patient should avoid carbonated beverages. The patient was instructed not to eat within 3 hours of bedtime. Pt instructed to avoid fatty meals. Suggested weight reduction.   • COURT on CPAP     Pulmonologist: Dr. Pulido. Managed with CPAP at 10 CM H2O   • Osteoarthritis of both knees     Orthopedic surgeon: Dr. Cheung. S/P left knee hemiarthroplasty in 2007. X-rays in 12/2014 reveal bilateral small effusions, advanced DJD left knee and mild right knee DJD. MRI of right knee (3/2015) with severe medial compartment osteoarthrosis with subchondral fracture of medial femoral condyle with associated osteochondral fracture posteriorly. Has medial femoral condyle avascular necrosis.   • Polyp of colon 3/7/2018    Gastroenterologist: Dr. Dunaway. S/P colonoscopy with polypectomy in 2004. Colonoscopy with polypectomy x 7 (5 tubular adenomas and 2 hyperplastic polyps) on 11/5/2014. Colonoscopy in 09/2018 with polypectomy x 5 (Tubular adenoma, sessile serrated adenoma and hyperplastic polyp). Next due in 09/2023.        Past Surgical History:   Procedure Laterality Date   • BONE CYST EXCISION Left     Left humeral bone cyst excision   • COLONOSCOPY W/ POLYPECTOMY     • JOINT REPLACEMENT      RTKR 2014, partial left knee 2008   • VARICOCELECTOMY  1985   • VASECTOMY         MEDICATIONS        Current Outpatient Medications:   •  B-complex with vitamin C tablet, Take 1 tablet by mouth daily., Disp: , Rfl:   •  amoxicillin (AMOXIL) 500 mg tablet, Take 4 tablets (2,000 mg total) by mouth once as needed (one hour prior to dental procedure.)., Disp: 4 tablet, Rfl: 5  •  amoxicillin-pot clavulanate  (AUGMENTIN) 875-125 mg per tablet, Take 1 tablet by mouth 2 (two) times a day for 7 days., Disp: 14 tablet, Rfl: 0  •  cetirizine 10 mg tablet, Take 10 mg by mouth daily., Disp: , Rfl:   •  cholecalciferol, vitamin D3, (VITAMIN D3) 2,000 unit tablet, Take 2,000 IU by mouth daily., Disp: , Rfl:   •  citalopram (celeXA) 10 mg tablet, Take 1 tablet (10 mg total) by mouth daily. (Patient taking differently: Take 30 mg by mouth daily.  ), Disp: 90 tablet, Rfl: 3  •  citalopram (celeXA) 20 mg tablet, TAKE 1 TABLET DAILY (Patient taking differently: 30 mg.  ), Disp: 90 tablet, Rfl: 3  •  EPINEPHrine (EPIPEN) 0.3 mg/0.3 mL injection syringe, Inject 0.3 mL (0.3 mg total) into the thigh as needed for anaphylaxis., Disp: 1 Syringe, Rfl: 0  •  fluticasone (FLONASE) 50 mcg/actuation nasal spray, Administer 2 sprays into each nostril daily as needed.  , Disp: , Rfl:   •  MULTIVITAMIN ORAL, Take 1 tablet by mouth daily.  , Disp: , Rfl:   •  omeprazole (PriLOSEC) 20 mg capsule, Take 20 mg by mouth every other day. Take 30 minutes prior to a meal. , Disp: , Rfl:     ALLERGIES      Cashew nut; Hydromorphone; [place] flavor; Pistachio nut; and Sunflower seed    FAMILY HISTORY      family history includes Arthritis in his biological brother, biological mother, and biological sister; COPD in his biological father; No Known Problems in his biological daughter, biological daughter, biological sister, and biological son; Schizophrenia in his biological brother.    Denies any prior known family history of DVTs/PEs/clotting disorder    SOCIAL HISTORY      Social History     Tobacco Use   • Smoking status: Never Smoker   • Smokeless tobacco: Never Used   Substance Use Topics   • Alcohol use: Yes     Alcohol/week: 6.0 standard drinks     Types: 6 Shots of liquor per week     Frequency: 4 or more times a week     Drinks per session: 1 or 2     Binge frequency: Never     Comment: socially   • Drug use: No       REVIEW OF SYSTEMS      All other  "systems reviewed and negative except as noted in HPI    PHYSICAL EXAMINATION      Visit Vitals  BP 96/60 (BP Location: Right upper arm, Patient Position: Sitting)   Pulse 78   Temp 36.1 °C (97 °F) (Temporal)   Resp 20   Ht 1.778 m (5' 10\")   Wt 116 kg (256 lb 9.6 oz)   SpO2 97%   BMI 36.82 kg/m²     Body mass index is 36.82 kg/m².    Physical Exam   Constitutional: He is oriented to person, place, and time.   Eyes: EOM are normal.   Cardiovascular: Regular rhythm. Exam reveals no gallop and no friction rub.   Pulmonary/Chest: Effort normal and breath sounds normal. No stridor. No respiratory distress. He has no wheezes. He has no rales. He exhibits no tenderness.   Abdominal: Soft. Bowel sounds are normal. He exhibits no distension. There is no tenderness. There is no guarding.   Neurological: He is alert and oriented to person, place, and time.   Skin: Skin is warm. He is not diaphoretic.   Psychiatric: He has a normal mood and affect. His behavior is normal. Judgment and thought content normal.       LABS / EKG        Labs  Results from last 7 days   Lab Units 01/16/20  1130   WBC K/uL 6.23   HEMOGLOBIN g/dL 14.2   HEMATOCRIT % 41.8   PLATELETS K/uL 250     Results from last 7 days   Lab Units 01/16/20  1130   SODIUM mEQ/L 135*   POTASSIUM mEQ/L 4.5   CHLORIDE mEQ/L 100   CO2 mEQ/L 25   BUN mg/dL 18   CREATININE mg/dL 0.8   CALCIUM mg/dL 9.2   GLUCOSE mg/dL 97       EKG   EKG: independently reviewed - sr w hr of 60. No st elevations.     ASSESSMENT AND PLAN         Preop examination  Pt sched for Knee Total Revision  Pt has no cardiac symptoms and ekg is nonischemic. Pt exhibits no signs/symptoms of angina, arrythmia or decompenstated CHF. Pt is sched to get 2d echo before obtaining a final cv clearance from dr. Wen. Will need to obtain cardiac clearance letter before proceeding w surgery  Pt's labs are at an acceptable level to proceed w surgery.     Head cold  Pt reports he is finishing course of augmentin " that was rx by his PCP and feels symptoms have resolved.     COURT (obstructive sleep apnea)  Recommend CPAP QHS and PRN. Cautious use or narcotics or sedatives  rec COURT protocol post op.     Depression  Mood stable currently.        In regards to perioperative cardiac risk:  The patient denies any history of ischemic heart disease, denies any history of CHF, denies any history of CVA, is not on pre-operative treatment with insulin, and does not have a pre-operative creatinine > 2 mg/dL.   Pt's revised cardiac risk index  (RCRI) is 0.4 % for rate of cardiac death, nonfatal myocardial infarction, and nonfatal cardiac arrest     Further comments:  Resume supplements when OK with surgical team.  I would encourage incentive spirometry to assist with minimizing hermelinda-operative pulmonary risk.  DVT prophylaxis and timing of such per the discretion of the surgeon.     Please do not hesitate to contact [facility] during the upcoming hospitalization with any questions or concerns.     Dylan Calderon. DO Latasha  1/16/2020

## 2020-01-16 NOTE — H&P (VIEW-ONLY)
[facility] Medicine Service -  Pre-Operative Consultation       Patient Name: Titus Ray  Referring Surgeon: dr. constantino    Reason for Referral: Pre-Operative Evaluation  Surgical Procedure: Knee Total Revision  Operative Date: 1/28/20  Other Providers:      PCP: Tom Douglas MD        HISTORY OF PRESENT ILLNESS      Titus Ray is a 64 y.o. male presenting today to the [facility] at [facility] for pre-operative evaluation prior to planned surgery.  Pt has had knee issues chronically and had undergone surgery of his knee in the past. This past September, pt was doing a lunge when he injured his knee. Pt was referred to see dr. constantino - orthopedics. After reviewing imaging studies and d/w dr. Constantino, pt has now opted for surgical intervention.     The patient denies any current chest pain or pressure, dyspnea, cough, sputum, fevers, chills, abdominal pain, nausea, vomiting, diarrhea, urinary complaints, dizzy, lightheaded, blood in stool or other symptoms.     Pt reports he recently experienced a head cold and is finishing course of augmentin that was rx by his PCP and feels symptoms have resolved.     No history of MI, arrhythmia,or CHF.  + history of COURT.  No history of DVT/PE.  No history of COPD.  No history of CVA.  No history of DM.   No history of CKD.     PAST MEDICAL AND SURGICAL HISTORY      Past Medical History:   Diagnosis Date   • Allergic rhinitis     Controlled with zyrtec and Fluticasone nasal spray as needed. Worse in spring and fall.   • Attention deficit disorder (ADD) in adult     Managed on Straterra in the past but not effective.   • Carpal tunnel syndrome 5/30/2018    Neurologist: Dr. Truong. EMG in 05/2018 with bilateral median neuropathy with right severe and left mild to moderate. Being treated with wrist splints.   • Complaint of nasal congestion     Dr Douglas prescribed Augmentin last day 1/16/20   • Depression      Controlled with citalopram.   • Diverticulosis 9/17/2018    Seen in the sigmoid colon during colonoscopy in 09/2018 and no history of diverticuliitis.    • GERD (gastroesophageal reflux disease)     Managed on Omeprazole. Should take medication about 30 minutes prior to meal.  The patient should avoid caffeinated products. The patient should avoid carbonated beverages. The patient was instructed not to eat within 3 hours of bedtime. Pt instructed to avoid fatty meals. Suggested weight reduction.   • COURT on CPAP     Pulmonologist: Dr. Pulido. Managed with CPAP at 10 CM H2O   • Osteoarthritis of both knees     Orthopedic surgeon: Dr. Cheung. S/P left knee hemiarthroplasty in 2007. X-rays in 12/2014 reveal bilateral small effusions, advanced DJD left knee and mild right knee DJD. MRI of right knee (3/2015) with severe medial compartment osteoarthrosis with subchondral fracture of medial femoral condyle with associated osteochondral fracture posteriorly. Has medial femoral condyle avascular necrosis.   • Polyp of colon 3/7/2018    Gastroenterologist: Dr. Dunaway. S/P colonoscopy with polypectomy in 2004. Colonoscopy with polypectomy x 7 (5 tubular adenomas and 2 hyperplastic polyps) on 11/5/2014. Colonoscopy in 09/2018 with polypectomy x 5 (Tubular adenoma, sessile serrated adenoma and hyperplastic polyp). Next due in 09/2023.        Past Surgical History:   Procedure Laterality Date   • BONE CYST EXCISION Left     Left humeral bone cyst excision   • COLONOSCOPY W/ POLYPECTOMY     • JOINT REPLACEMENT      RTKR 2014, partial left knee 2008   • VARICOCELECTOMY  1985   • VASECTOMY         MEDICATIONS        Current Outpatient Medications:   •  B-complex with vitamin C tablet, Take 1 tablet by mouth daily., Disp: , Rfl:   •  amoxicillin (AMOXIL) 500 mg tablet, Take 4 tablets (2,000 mg total) by mouth once as needed (one hour prior to dental procedure.)., Disp: 4 tablet, Rfl: 5  •  amoxicillin-pot clavulanate  (AUGMENTIN) 875-125 mg per tablet, Take 1 tablet by mouth 2 (two) times a day for 7 days., Disp: 14 tablet, Rfl: 0  •  cetirizine 10 mg tablet, Take 10 mg by mouth daily., Disp: , Rfl:   •  cholecalciferol, vitamin D3, (VITAMIN D3) 2,000 unit tablet, Take 2,000 IU by mouth daily., Disp: , Rfl:   •  citalopram (celeXA) 10 mg tablet, Take 1 tablet (10 mg total) by mouth daily. (Patient taking differently: Take 30 mg by mouth daily.  ), Disp: 90 tablet, Rfl: 3  •  citalopram (celeXA) 20 mg tablet, TAKE 1 TABLET DAILY (Patient taking differently: 30 mg.  ), Disp: 90 tablet, Rfl: 3  •  EPINEPHrine (EPIPEN) 0.3 mg/0.3 mL injection syringe, Inject 0.3 mL (0.3 mg total) into the thigh as needed for anaphylaxis., Disp: 1 Syringe, Rfl: 0  •  fluticasone (FLONASE) 50 mcg/actuation nasal spray, Administer 2 sprays into each nostril daily as needed.  , Disp: , Rfl:   •  MULTIVITAMIN ORAL, Take 1 tablet by mouth daily.  , Disp: , Rfl:   •  omeprazole (PriLOSEC) 20 mg capsule, Take 20 mg by mouth every other day. Take 30 minutes prior to a meal. , Disp: , Rfl:     ALLERGIES      Cashew nut; Hydromorphone; [place] flavor; Pistachio nut; and Sunflower seed    FAMILY HISTORY      family history includes Arthritis in his biological brother, biological mother, and biological sister; COPD in his biological father; No Known Problems in his biological daughter, biological daughter, biological sister, and biological son; Schizophrenia in his biological brother.    Denies any prior known family history of DVTs/PEs/clotting disorder    SOCIAL HISTORY      Social History     Tobacco Use   • Smoking status: Never Smoker   • Smokeless tobacco: Never Used   Substance Use Topics   • Alcohol use: Yes     Alcohol/week: 6.0 standard drinks     Types: 6 Shots of liquor per week     Frequency: 4 or more times a week     Drinks per session: 1 or 2     Binge frequency: Never     Comment: socially   • Drug use: No       REVIEW OF SYSTEMS      All other  "systems reviewed and negative except as noted in HPI    PHYSICAL EXAMINATION      Visit Vitals  BP 96/60 (BP Location: Right upper arm, Patient Position: Sitting)   Pulse 78   Temp 36.1 °C (97 °F) (Temporal)   Resp 20   Ht 1.778 m (5' 10\")   Wt 116 kg (256 lb 9.6 oz)   SpO2 97%   BMI 36.82 kg/m²     Body mass index is 36.82 kg/m².    Physical Exam   Constitutional: He is oriented to person, place, and time.   Eyes: EOM are normal.   Cardiovascular: Regular rhythm. Exam reveals no gallop and no friction rub.   Pulmonary/Chest: Effort normal and breath sounds normal. No stridor. No respiratory distress. He has no wheezes. He has no rales. He exhibits no tenderness.   Abdominal: Soft. Bowel sounds are normal. He exhibits no distension. There is no tenderness. There is no guarding.   Neurological: He is alert and oriented to person, place, and time.   Skin: Skin is warm. He is not diaphoretic.   Psychiatric: He has a normal mood and affect. His behavior is normal. Judgment and thought content normal.       LABS / EKG        Labs  Results from last 7 days   Lab Units 01/16/20  1130   WBC K/uL 6.23   HEMOGLOBIN g/dL 14.2   HEMATOCRIT % 41.8   PLATELETS K/uL 250     Results from last 7 days   Lab Units 01/16/20  1130   SODIUM mEQ/L 135*   POTASSIUM mEQ/L 4.5   CHLORIDE mEQ/L 100   CO2 mEQ/L 25   BUN mg/dL 18   CREATININE mg/dL 0.8   CALCIUM mg/dL 9.2   GLUCOSE mg/dL 97       EKG   EKG: independently reviewed - sr w hr of 60. No st elevations.     ASSESSMENT AND PLAN         Preop examination  Pt sched for Knee Total Revision  Pt has no cardiac symptoms and ekg is nonischemic. Pt exhibits no signs/symptoms of angina, arrythmia or decompenstated CHF. Pt is sched to get 2d echo before obtaining a final cv clearance from dr. Wen. Will need to obtain cardiac clearance letter before proceeding w surgery  Pt's labs are at an acceptable level to proceed w surgery.     Head cold  Pt reports he is finishing course of augmentin " that was rx by his PCP and feels symptoms have resolved.     COURT (obstructive sleep apnea)  Recommend CPAP QHS and PRN. Cautious use or narcotics or sedatives  rec COURT protocol post op.     Depression  Mood stable currently.        In regards to perioperative cardiac risk:  The patient denies any history of ischemic heart disease, denies any history of CHF, denies any history of CVA, is not on pre-operative treatment with insulin, and does not have a pre-operative creatinine > 2 mg/dL.   Pt's revised cardiac risk index  (RCRI) is 0.4 % for rate of cardiac death, nonfatal myocardial infarction, and nonfatal cardiac arrest     Further comments:  Resume supplements when OK with surgical team.  I would encourage incentive spirometry to assist with minimizing hermelinda-operative pulmonary risk.  DVT prophylaxis and timing of such per the discretion of the surgeon.     Please do not hesitate to contact [facility] during the upcoming hospitalization with any questions or concerns.     Dylan Calderon. DO [place]  1/16/2020

## 2020-01-16 NOTE — PRE-PROCEDURE INSTRUCTIONS
1. Admissions will call you with your arrival time on January 27th  (day prior to surgery) between 2pm - 4pm. For questions about your arrival time, please call [phone].    2. Please report to the [facility] in the [place] on the day of your procedure. From the [place] [address] stay to the right side of the lobby, down the short hallway into the atrium. If you are parking in the [facility] [place] [facility], come to the ground floor of the garage and follow signs to the [facility] which will bring you up a ramp into the atrium. Bring your insurance card and photo ID.     3. Please follow these fasting guidelines:  - STOP all solid food 8 hours prior to arrival.   - No more than 12oz of water is permitted and must STOP 2 HOURS prior to arrival to the hospital.     4. Early on the morning of the procedure, please take the following medications listed below with a sip of water, in addition to any medications prescribed by your surgeon:   Celexa  Omeprazole  Flonase-if needed    **Bring CPAP machine    *NO aspirin, ibuprofen, anti-inflammatories, fish oil or Vitamin E unless ordered by physician.    *Stop taking anything per Dr Harman     5. Other instructions: antibacterial shower x 2, brush teeth    6. If you develop a cough, cold, fever, or other symptom prior to the date of the procedure, please report it to your physician immediately.    7. If you need to cancel the procedure for any reason, please contact your physician.    8. Make arrangements to have someone drive you home from the procedure. If you have not arranged for transportation home, your surgery may be cancelled.     9. You may not take public transportation or or a cab unless accompanied by a responsible person.    10. You may not drive a car or operate complex or potentially dangerous machinery for 24 hours following anesthesia and/or sedation.    11. If it is medically necessary for you to have a longer stay, you will be informed as  soon as the decision is made.    12. Do not wear or bring anything of value to the hospital, including jewelry of any kind. Do not wear make-up or contact lenses. DO bring your glasses and hearing aid, with a case.    13. Dress in comfortable clothes.    14. If instructed, please bring a copy of your Advance Directive (Living Will/Durable Power of ) on the day of your procedure.    Pre operative instructions given as per protocol.  Form explained by:     Nolvia Ferrera RN

## 2020-01-20 ENCOUNTER — TELEPHONE (OUTPATIENT)
Dept: PRIMARY CARE | Facility: CLINIC | Age: 65
End: 2020-01-20

## 2020-01-20 NOTE — TELEPHONE ENCOUNTER
----- Message from Titus Ray sent at 1/20/2020  1:09 PM EST -----  Regarding: Referral Request  Contact: [phone]  I have a routine eye exam scheduled for February 19 for which I need a referral.  I'm asking early since I have knee revision surgery coming up, so i'm trying to get everything else lined up in advance.    The NPI # is [id number] for [facility] in their [place] office at [address]  [phone]    I'm not sure what the diagnosis code might be, but I have my eye pressure checked every 6 months becuase of family history and risk factor for glaucoma.    Thank you!    - Deric

## 2020-01-20 NOTE — TELEPHONE ENCOUNTER
Referral added for patient. Left vm for patient that the referral was added. Referral number is [id number] this is good until 4/18/2020.

## 2020-01-27 PROBLEM — J00 HEAD COLD: Status: RESOLVED | Noted: 2020-01-16 | Resolved: 2020-01-27

## 2020-01-28 ENCOUNTER — APPOINTMENT (OUTPATIENT)
Dept: RADIOLOGY | Facility: HOSPITAL | Age: 65
Setting detail: SURGERY ADMIT
DRG: 468 | End: 2020-01-28
Attending: NURSE PRACTITIONER
Payer: COMMERCIAL

## 2020-01-28 ENCOUNTER — HOSPITAL ENCOUNTER (INPATIENT)
Facility: HOSPITAL | Age: 65
LOS: 2 days | DRG: 468 | End: 2020-01-30
Attending: ORTHOPAEDIC SURGERY | Admitting: ORTHOPAEDIC SURGERY
Payer: COMMERCIAL

## 2020-01-28 ENCOUNTER — ANESTHESIA (OUTPATIENT)
Dept: OPERATING ROOM | Facility: HOSPITAL | Age: 65
Setting detail: SURGERY ADMIT
DRG: 468 | End: 2020-01-28
Payer: COMMERCIAL

## 2020-01-28 DIAGNOSIS — T84.84XA PAIN IN PROSTHETIC JOINT, INITIAL ENCOUNTER (CMS/HCC): ICD-10-CM

## 2020-01-28 PROBLEM — Z96.651 S/P REVISION OF TOTAL KNEE, RIGHT: Status: ACTIVE | Noted: 2020-01-28

## 2020-01-28 PROBLEM — Z96.651 STATUS POST REVISION OF TOTAL REPLACEMENT OF RIGHT KNEE: Status: ACTIVE | Noted: 2020-01-28

## 2020-01-28 PROCEDURE — 71000011 HC PACU PHASE 1 EA ADDL MIN: Performed by: ORTHOPAEDIC SURGERY

## 2020-01-28 PROCEDURE — 87116 MYCOBACTERIA CULTURE: CPT | Performed by: ORTHOPAEDIC SURGERY

## 2020-01-28 PROCEDURE — 63600000 HC DRUGS/DETAIL CODE: Performed by: NURSE PRACTITIONER

## 2020-01-28 PROCEDURE — 87070 CULTURE OTHR SPECIMN AEROBIC: CPT | Performed by: ORTHOPAEDIC SURGERY

## 2020-01-28 PROCEDURE — 63600000 HC DRUGS/DETAIL CODE: Performed by: SPECIALIST

## 2020-01-28 PROCEDURE — 12000000 HC ROOM AND CARE MED/SURG

## 2020-01-28 PROCEDURE — 0SPC0JZ REMOVAL OF SYNTHETIC SUBSTITUTE FROM RIGHT KNEE JOINT, OPEN APPROACH: ICD-10-PCS | Performed by: ORTHOPAEDIC SURGERY

## 2020-01-28 PROCEDURE — 25800000 HC PHARMACY IV SOLUTIONS: Performed by: ORTHOPAEDIC SURGERY

## 2020-01-28 PROCEDURE — 37000010 HC ANESTHESIA SPINAL: Performed by: ORTHOPAEDIC SURGERY

## 2020-01-28 PROCEDURE — 71000001 HC PACU PHASE 1 INITIAL 30MIN: Performed by: ORTHOPAEDIC SURGERY

## 2020-01-28 PROCEDURE — 73560 X-RAY EXAM OF KNEE 1 OR 2: CPT | Mod: RT

## 2020-01-28 PROCEDURE — 63600000 HC DRUGS/DETAIL CODE: Performed by: ORTHOPAEDIC SURGERY

## 2020-01-28 PROCEDURE — 27800000 HC SUPPLY/IMPLANTS: Performed by: ORTHOPAEDIC SURGERY

## 2020-01-28 PROCEDURE — 36000005 HC OR LEVEL 5 INITIAL 30MIN: Performed by: ORTHOPAEDIC SURGERY

## 2020-01-28 PROCEDURE — 25000000 HC PHARMACY GENERAL: Performed by: ORTHOPAEDIC SURGERY

## 2020-01-28 PROCEDURE — 99232 SBSQ HOSP IP/OBS MODERATE 35: CPT | Performed by: HOSPITALIST

## 2020-01-28 PROCEDURE — C1776 JOINT DEVICE (IMPLANTABLE): HCPCS | Performed by: ORTHOPAEDIC SURGERY

## 2020-01-28 PROCEDURE — 63700000 HC SELF-ADMINISTRABLE DRUG

## 2020-01-28 PROCEDURE — C1713 ANCHOR/SCREW BN/BN,TIS/BN: HCPCS | Performed by: ORTHOPAEDIC SURGERY

## 2020-01-28 PROCEDURE — 63700000 HC SELF-ADMINISTRABLE DRUG: Performed by: ORTHOPAEDIC SURGERY

## 2020-01-28 PROCEDURE — 0SRC0J9 REPLACEMENT OF RIGHT KNEE JOINT WITH SYNTHETIC SUBSTITUTE, CEMENTED, OPEN APPROACH: ICD-10-PCS | Performed by: ORTHOPAEDIC SURGERY

## 2020-01-28 PROCEDURE — 97162 PT EVAL MOD COMPLEX 30 MIN: CPT | Mod: GP

## 2020-01-28 PROCEDURE — 87205 SMEAR GRAM STAIN: CPT | Performed by: ORTHOPAEDIC SURGERY

## 2020-01-28 PROCEDURE — 87075 CULTR BACTERIA EXCEPT BLOOD: CPT | Performed by: ORTHOPAEDIC SURGERY

## 2020-01-28 PROCEDURE — 87102 FUNGUS ISOLATION CULTURE: CPT | Performed by: ORTHOPAEDIC SURGERY

## 2020-01-28 PROCEDURE — 27200000 HC STERILE SUPPLY: Performed by: ORTHOPAEDIC SURGERY

## 2020-01-28 PROCEDURE — 25000000 HC PHARMACY GENERAL: Performed by: SPECIALIST

## 2020-01-28 PROCEDURE — 25800000 HC PHARMACY IV SOLUTIONS: Performed by: SPECIALIST

## 2020-01-28 PROCEDURE — 87206 SMEAR FLUORESCENT/ACID STAI: CPT | Performed by: ORTHOPAEDIC SURGERY

## 2020-01-28 PROCEDURE — 63700000 HC SELF-ADMINISTRABLE DRUG: Performed by: NURSE PRACTITIONER

## 2020-01-28 PROCEDURE — 36000015 HC OR LEVEL 5 EA ADDL MIN: Performed by: ORTHOPAEDIC SURGERY

## 2020-01-28 PROCEDURE — 97166 OT EVAL MOD COMPLEX 45 MIN: CPT | Mod: GO

## 2020-01-28 PROCEDURE — 25800000 HC PHARMACY IV SOLUTIONS: Performed by: NURSE PRACTITIONER

## 2020-01-28 DEVICE — CEMENT BONE SIMPLEX FULL DOSE: Type: IMPLANTABLE DEVICE | Site: KNEE | Status: FUNCTIONAL

## 2020-01-28 DEVICE — CABLE 1.7MM W/CRIMP: Type: IMPLANTABLE DEVICE | Site: KNEE | Status: FUNCTIONAL

## 2020-01-28 DEVICE — IMPLANTABLE DEVICE: Type: IMPLANTABLE DEVICE | Site: KNEE | Status: FUNCTIONAL

## 2020-01-28 DEVICE — BASEPLATE TIBIAL UNIV SZ 6: Type: IMPLANTABLE DEVICE | Site: KNEE | Status: FUNCTIONAL

## 2020-01-28 RX ORDER — ROCURONIUM BROMIDE 10 MG/ML
INJECTION, SOLUTION INTRAVENOUS AS NEEDED
Status: DISCONTINUED | OUTPATIENT
Start: 2020-01-28 | End: 2020-01-28 | Stop reason: SURG

## 2020-01-28 RX ORDER — SODIUM CHLORIDE 9 MG/ML
INJECTION INTRAMUSCULAR; INTRAVENOUS; SUBCUTANEOUS AS NEEDED
Status: DISCONTINUED | OUTPATIENT
Start: 2020-01-28 | End: 2020-01-28 | Stop reason: HOSPADM

## 2020-01-28 RX ORDER — POLYETHYLENE GLYCOL 3350 17 G/17G
17 POWDER, FOR SOLUTION ORAL DAILY
Status: DISCONTINUED | OUTPATIENT
Start: 2020-01-28 | End: 2020-01-30 | Stop reason: HOSPADM

## 2020-01-28 RX ORDER — VANCOMYCIN HYDROCHLORIDE 500 MG/10ML
INJECTION, POWDER, LYOPHILIZED, FOR SOLUTION INTRAVENOUS AS NEEDED
Status: DISCONTINUED | OUTPATIENT
Start: 2020-01-28 | End: 2020-01-28 | Stop reason: HOSPADM

## 2020-01-28 RX ORDER — ACETAMINOPHEN 325 MG/1
TABLET ORAL
Status: COMPLETED
Start: 2020-01-28 | End: 2020-01-28

## 2020-01-28 RX ORDER — CELECOXIB 200 MG/1
CAPSULE ORAL
Status: COMPLETED
Start: 2020-01-28 | End: 2020-01-28

## 2020-01-28 RX ORDER — ONDANSETRON HYDROCHLORIDE 2 MG/ML
INJECTION, SOLUTION INTRAVENOUS AS NEEDED
Status: DISCONTINUED | OUTPATIENT
Start: 2020-01-28 | End: 2020-01-28 | Stop reason: SURG

## 2020-01-28 RX ORDER — IBUPROFEN 200 MG
16-32 TABLET ORAL AS NEEDED
Status: DISCONTINUED | OUTPATIENT
Start: 2020-01-28 | End: 2020-01-30 | Stop reason: HOSPADM

## 2020-01-28 RX ORDER — LIDOCAINE HYDROCHLORIDE 10 MG/ML
INJECTION, SOLUTION INFILTRATION; PERINEURAL AS NEEDED
Status: DISCONTINUED | OUTPATIENT
Start: 2020-01-28 | End: 2020-01-28 | Stop reason: SURG

## 2020-01-28 RX ORDER — DIPHENHYDRAMINE HCL 25 MG
25 CAPSULE ORAL EVERY 6 HOURS PRN
Status: DISCONTINUED | OUTPATIENT
Start: 2020-01-28 | End: 2020-01-30 | Stop reason: HOSPADM

## 2020-01-28 RX ORDER — DIPHENHYDRAMINE HCL 50 MG/ML
25 VIAL (ML) INJECTION EVERY 6 HOURS PRN
Status: DISCONTINUED | OUTPATIENT
Start: 2020-01-28 | End: 2020-01-30 | Stop reason: HOSPADM

## 2020-01-28 RX ORDER — BISACODYL 10 MG/1
10 SUPPOSITORY RECTAL DAILY PRN
Status: DISCONTINUED | OUTPATIENT
Start: 2020-01-28 | End: 2020-01-30 | Stop reason: HOSPADM

## 2020-01-28 RX ORDER — FENTANYL CITRATE/PF 250MCG/5ML
SYRINGE (ML) INTRAVENOUS AS NEEDED
Status: DISCONTINUED | OUTPATIENT
Start: 2020-01-28 | End: 2020-01-28

## 2020-01-28 RX ORDER — DEXTROSE 50 % IN WATER (D50W) INTRAVENOUS SYRINGE
25 AS NEEDED
Status: DISCONTINUED | OUTPATIENT
Start: 2020-01-28 | End: 2020-01-28 | Stop reason: HOSPADM

## 2020-01-28 RX ORDER — DEXTROSE 40 %
15-30 GEL (GRAM) ORAL AS NEEDED
Status: DISCONTINUED | OUTPATIENT
Start: 2020-01-28 | End: 2020-01-28 | Stop reason: HOSPADM

## 2020-01-28 RX ORDER — DEXTROSE 50 % IN WATER (D50W) INTRAVENOUS SYRINGE
25 AS NEEDED
Status: DISCONTINUED | OUTPATIENT
Start: 2020-01-28 | End: 2020-01-30 | Stop reason: HOSPADM

## 2020-01-28 RX ORDER — ONDANSETRON HYDROCHLORIDE 2 MG/ML
4 INJECTION, SOLUTION INTRAVENOUS EVERY 8 HOURS PRN
Status: DISCONTINUED | OUTPATIENT
Start: 2020-01-28 | End: 2020-01-30 | Stop reason: HOSPADM

## 2020-01-28 RX ORDER — CETIRIZINE HYDROCHLORIDE 5 MG/1
5 TABLET ORAL NIGHTLY
Status: DISCONTINUED | OUTPATIENT
Start: 2020-01-28 | End: 2020-01-30 | Stop reason: HOSPADM

## 2020-01-28 RX ORDER — OXYCODONE HYDROCHLORIDE 5 MG/1
5-10 TABLET ORAL EVERY 4 HOURS PRN
Status: DISCONTINUED | OUTPATIENT
Start: 2020-01-28 | End: 2020-01-30 | Stop reason: HOSPADM

## 2020-01-28 RX ORDER — METOCLOPRAMIDE HYDROCHLORIDE 5 MG/ML
10 INJECTION INTRAMUSCULAR; INTRAVENOUS
Status: DISCONTINUED | OUTPATIENT
Start: 2020-01-28 | End: 2020-01-28 | Stop reason: HOSPADM

## 2020-01-28 RX ORDER — MIDAZOLAM HYDROCHLORIDE 2 MG/2ML
INJECTION, SOLUTION INTRAMUSCULAR; INTRAVENOUS AS NEEDED
Status: DISCONTINUED | OUTPATIENT
Start: 2020-01-28 | End: 2020-01-28 | Stop reason: SURG

## 2020-01-28 RX ORDER — PANTOPRAZOLE SODIUM 20 MG/1
20 TABLET, DELAYED RELEASE ORAL DAILY
Status: DISCONTINUED | OUTPATIENT
Start: 2020-01-28 | End: 2020-01-30 | Stop reason: HOSPADM

## 2020-01-28 RX ORDER — SODIUM CHLORIDE 9 MG/ML
INJECTION, SOLUTION INTRAVENOUS CONTINUOUS
Status: DISPENSED | OUTPATIENT
Start: 2020-01-28 | End: 2020-01-29

## 2020-01-28 RX ORDER — FLUTICASONE PROPIONATE 50 MCG
2 SPRAY, SUSPENSION (ML) NASAL DAILY PRN
Status: DISCONTINUED | OUTPATIENT
Start: 2020-01-28 | End: 2020-01-30 | Stop reason: HOSPADM

## 2020-01-28 RX ORDER — DEXAMETHASONE SODIUM PHOSPHATE 4 MG/ML
INJECTION, SOLUTION INTRA-ARTICULAR; INTRALESIONAL; INTRAMUSCULAR; INTRAVENOUS; SOFT TISSUE AS NEEDED
Status: DISCONTINUED | OUTPATIENT
Start: 2020-01-28 | End: 2020-01-28 | Stop reason: SURG

## 2020-01-28 RX ORDER — ONDANSETRON HYDROCHLORIDE 2 MG/ML
4 INJECTION, SOLUTION INTRAVENOUS
Status: DISCONTINUED | OUTPATIENT
Start: 2020-01-28 | End: 2020-01-28 | Stop reason: HOSPADM

## 2020-01-28 RX ORDER — ACETAMINOPHEN 325 MG/1
975 TABLET ORAL
Status: COMPLETED | OUTPATIENT
Start: 2020-01-28 | End: 2020-01-28

## 2020-01-28 RX ORDER — KETAMINE HYDROCHLORIDE 10 MG/ML
INJECTION, SOLUTION INTRAMUSCULAR; INTRAVENOUS AS NEEDED
Status: DISCONTINUED | OUTPATIENT
Start: 2020-01-28 | End: 2020-01-28 | Stop reason: SURG

## 2020-01-28 RX ORDER — AMOXICILLIN 250 MG
1 CAPSULE ORAL 2 TIMES DAILY
Status: DISCONTINUED | OUTPATIENT
Start: 2020-01-28 | End: 2020-01-30 | Stop reason: HOSPADM

## 2020-01-28 RX ORDER — ACETAMINOPHEN 325 MG/1
650 TABLET ORAL EVERY 4 HOURS PRN
Status: DISCONTINUED | OUTPATIENT
Start: 2020-01-28 | End: 2020-01-30 | Stop reason: HOSPADM

## 2020-01-28 RX ORDER — IBUPROFEN 200 MG
16-32 TABLET ORAL AS NEEDED
Status: DISCONTINUED | OUTPATIENT
Start: 2020-01-28 | End: 2020-01-28 | Stop reason: HOSPADM

## 2020-01-28 RX ORDER — SODIUM CHLORIDE 9 MG/ML
INJECTION, SOLUTION INTRAVENOUS CONTINUOUS PRN
Status: DISCONTINUED | OUTPATIENT
Start: 2020-01-28 | End: 2020-01-28 | Stop reason: SURG

## 2020-01-28 RX ORDER — CELECOXIB 200 MG/1
400 CAPSULE ORAL
Status: COMPLETED | OUTPATIENT
Start: 2020-01-28 | End: 2020-01-28

## 2020-01-28 RX ORDER — ROPIVACAINE HYDROCHLORIDE 5 MG/ML
INJECTION, SOLUTION EPIDURAL; INFILTRATION; PERINEURAL AS NEEDED
Status: DISCONTINUED | OUTPATIENT
Start: 2020-01-28 | End: 2020-01-28 | Stop reason: HOSPADM

## 2020-01-28 RX ORDER — DEXAMETHASONE SODIUM PHOSPHATE 4 MG/ML
8 INJECTION, SOLUTION INTRA-ARTICULAR; INTRALESIONAL; INTRAMUSCULAR; INTRAVENOUS; SOFT TISSUE ONCE
Status: COMPLETED | OUTPATIENT
Start: 2020-01-29 | End: 2020-01-29

## 2020-01-28 RX ORDER — FENTANYL CITRATE 50 UG/ML
50 INJECTION, SOLUTION INTRAMUSCULAR; INTRAVENOUS
Status: COMPLETED | OUTPATIENT
Start: 2020-01-28 | End: 2020-01-28

## 2020-01-28 RX ORDER — ALUMINUM HYDROXIDE, MAGNESIUM HYDROXIDE, AND SIMETHICONE 1200; 120; 1200 MG/30ML; MG/30ML; MG/30ML
30 SUSPENSION ORAL EVERY 4 HOURS PRN
Status: DISCONTINUED | OUTPATIENT
Start: 2020-01-28 | End: 2020-01-30 | Stop reason: HOSPADM

## 2020-01-28 RX ORDER — PROPOFOL 10 MG/ML
INJECTION, EMULSION INTRAVENOUS AS NEEDED
Status: DISCONTINUED | OUTPATIENT
Start: 2020-01-28 | End: 2020-01-28 | Stop reason: SURG

## 2020-01-28 RX ORDER — METOPROLOL TARTRATE 1 MG/ML
INJECTION, SOLUTION INTRAVENOUS AS NEEDED
Status: DISCONTINUED | OUTPATIENT
Start: 2020-01-28 | End: 2020-01-28 | Stop reason: SURG

## 2020-01-28 RX ORDER — SODIUM CHLORIDE, SODIUM GLUCONATE, SODIUM ACETATE, POTASSIUM CHLORIDE AND MAGNESIUM CHLORIDE 30; 37; 368; 526; 502 MG/100ML; MG/100ML; MG/100ML; MG/100ML; MG/100ML
INJECTION, SOLUTION INTRAVENOUS CONTINUOUS PRN
Status: DISCONTINUED | OUTPATIENT
Start: 2020-01-28 | End: 2020-01-28 | Stop reason: SURG

## 2020-01-28 RX ORDER — DEXTROSE 40 %
15-30 GEL (GRAM) ORAL AS NEEDED
Status: DISCONTINUED | OUTPATIENT
Start: 2020-01-28 | End: 2020-01-30 | Stop reason: HOSPADM

## 2020-01-28 RX ORDER — ACETAMINOPHEN 325 MG/1
650 TABLET ORAL
Status: COMPLETED | OUTPATIENT
Start: 2020-01-28 | End: 2020-01-30

## 2020-01-28 RX ORDER — ONDANSETRON 4 MG/1
4 TABLET, ORALLY DISINTEGRATING ORAL EVERY 8 HOURS PRN
Status: DISCONTINUED | OUTPATIENT
Start: 2020-01-28 | End: 2020-01-30 | Stop reason: HOSPADM

## 2020-01-28 RX ORDER — KETOROLAC TROMETHAMINE 15 MG/ML
15 INJECTION, SOLUTION INTRAMUSCULAR; INTRAVENOUS
Status: COMPLETED | OUTPATIENT
Start: 2020-01-28 | End: 2020-01-30

## 2020-01-28 RX ORDER — NAPROXEN SODIUM 220 MG/1
81 TABLET, FILM COATED ORAL 2 TIMES DAILY
Status: DISCONTINUED | OUTPATIENT
Start: 2020-01-28 | End: 2020-01-30 | Stop reason: HOSPADM

## 2020-01-28 RX ORDER — EPHEDRINE SULFATE 50 MG/ML
INJECTION, SOLUTION INTRAVENOUS AS NEEDED
Status: DISCONTINUED | OUTPATIENT
Start: 2020-01-28 | End: 2020-01-28 | Stop reason: SURG

## 2020-01-28 RX ORDER — FENTANYL CITRATE 50 UG/ML
INJECTION, SOLUTION INTRAMUSCULAR; INTRAVENOUS AS NEEDED
Status: DISCONTINUED | OUTPATIENT
Start: 2020-01-28 | End: 2020-01-28 | Stop reason: SURG

## 2020-01-28 RX ADMIN — FENTANYL CITRATE 100 MCG: 50 INJECTION, SOLUTION INTRAMUSCULAR; INTRAVENOUS at 10:02

## 2020-01-28 RX ADMIN — SODIUM CHLORIDE: 900 INJECTION, SOLUTION INTRAVENOUS at 08:12

## 2020-01-28 RX ADMIN — ACETAMINOPHEN 975 MG: 325 TABLET ORAL at 07:56

## 2020-01-28 RX ADMIN — CELECOXIB 400 MG: 200 CAPSULE ORAL at 07:56

## 2020-01-28 RX ADMIN — DEXAMETHASONE SODIUM PHOSPHATE 8 MG: 4 INJECTION, SOLUTION INTRA-ARTICULAR; INTRALESIONAL; INTRAMUSCULAR; INTRAVENOUS; SOFT TISSUE at 08:50

## 2020-01-28 RX ADMIN — FENTANYL CITRATE 50 MCG: 50 INJECTION INTRAMUSCULAR; INTRAVENOUS at 12:29

## 2020-01-28 RX ADMIN — FENTANYL CITRATE 50 MCG: 50 INJECTION INTRAMUSCULAR; INTRAVENOUS at 13:49

## 2020-01-28 RX ADMIN — OXYCODONE HYDROCHLORIDE 10 MG: 5 TABLET ORAL at 21:28

## 2020-01-28 RX ADMIN — FENTANYL CITRATE 50 MCG: 50 INJECTION, SOLUTION INTRAMUSCULAR; INTRAVENOUS at 11:30

## 2020-01-28 RX ADMIN — SUCCINYLCHOLINE CHLORIDE 140 MG: 20 INJECTION, SOLUTION INTRAMUSCULAR; INTRAVENOUS; PARENTERAL at 08:42

## 2020-01-28 RX ADMIN — VANCOMYCIN HYDROCHLORIDE 1500 MG: 1 INJECTION, POWDER, LYOPHILIZED, FOR SOLUTION INTRAVENOUS at 07:56

## 2020-01-28 RX ADMIN — ACETAMINOPHEN 650 MG: 325 TABLET ORAL at 15:50

## 2020-01-28 RX ADMIN — EPHEDRINE SULFATE 10 MG: 50 INJECTION INTRAVENOUS at 11:13

## 2020-01-28 RX ADMIN — SODIUM CHLORIDE, SODIUM GLUCONATE, SODIUM ACETATE, POTASSIUM CHLORIDE AND MAGNESIUM CHLORIDE: 526; 502; 368; 37; 30 INJECTION, SOLUTION INTRAVENOUS at 10:38

## 2020-01-28 RX ADMIN — PROPOFOL 20 MG: 10 INJECTION, EMULSION INTRAVENOUS at 10:23

## 2020-01-28 RX ADMIN — SODIUM CHLORIDE 2 G: 9 INJECTION, SOLUTION INTRAVENOUS at 08:30

## 2020-01-28 RX ADMIN — ROCURONIUM BROMIDE 10 MG: 10 INJECTION, SOLUTION INTRAVENOUS at 08:41

## 2020-01-28 RX ADMIN — OXYCODONE HYDROCHLORIDE 10 MG: 5 TABLET ORAL at 15:51

## 2020-01-28 RX ADMIN — ROCURONIUM BROMIDE 40 MG: 10 INJECTION, SOLUTION INTRAVENOUS at 08:53

## 2020-01-28 RX ADMIN — CETIRIZINE HYDROCHLORIDE 5 MG: 5 TABLET ORAL at 21:28

## 2020-01-28 RX ADMIN — FENTANYL CITRATE 50 MCG: 50 INJECTION, SOLUTION INTRAMUSCULAR; INTRAVENOUS at 08:12

## 2020-01-28 RX ADMIN — SODIUM CHLORIDE: 9 INJECTION, SOLUTION INTRAVENOUS at 16:00

## 2020-01-28 RX ADMIN — METOPROLOL TARTRATE 5 MG: 5 INJECTION, SOLUTION INTRAVENOUS at 11:45

## 2020-01-28 RX ADMIN — FENTANYL CITRATE 50 MCG: 50 INJECTION INTRAMUSCULAR; INTRAVENOUS at 12:53

## 2020-01-28 RX ADMIN — ASPIRIN 81 MG 81 MG: 81 TABLET ORAL at 19:44

## 2020-01-28 RX ADMIN — EPHEDRINE SULFATE 10 MG: 50 INJECTION INTRAVENOUS at 09:06

## 2020-01-28 RX ADMIN — FENTANYL CITRATE 50 MCG: 50 INJECTION INTRAMUSCULAR; INTRAVENOUS at 12:01

## 2020-01-28 RX ADMIN — TRANEXAMIC ACID 2300 MG: 100 INJECTION, SOLUTION INTRAVENOUS at 09:12

## 2020-01-28 RX ADMIN — LIDOCAINE HYDROCHLORIDE 5 ML: 10 INJECTION, SOLUTION INFILTRATION; PERINEURAL at 08:41

## 2020-01-28 RX ADMIN — Medication 50 MG: at 08:53

## 2020-01-28 RX ADMIN — FENTANYL CITRATE 100 MCG: 50 INJECTION, SOLUTION INTRAMUSCULAR; INTRAVENOUS at 08:48

## 2020-01-28 RX ADMIN — PROPOFOL 250 MG: 10 INJECTION, EMULSION INTRAVENOUS at 08:41

## 2020-01-28 RX ADMIN — PANTOPRAZOLE SODIUM 20 MG: 20 TABLET, DELAYED RELEASE ORAL at 15:51

## 2020-01-28 RX ADMIN — CEFAZOLIN SODIUM IN SODIUM CHLORIDE 0.9% IV SOLN 2 GM/100ML 2 G: 2-0.9/1 SOLUTION at 18:08

## 2020-01-28 RX ADMIN — KETOROLAC TROMETHAMINE 15 MG: 15 INJECTION, SOLUTION INTRAMUSCULAR; INTRAVENOUS at 15:20

## 2020-01-28 RX ADMIN — PROPOFOL 40 MG: 10 INJECTION, EMULSION INTRAVENOUS at 10:35

## 2020-01-28 RX ADMIN — ACETAMINOPHEN 650 MG: 325 TABLET ORAL at 21:28

## 2020-01-28 RX ADMIN — KETOROLAC TROMETHAMINE 15 MG: 15 INJECTION, SOLUTION INTRAMUSCULAR; INTRAVENOUS at 21:28

## 2020-01-28 RX ADMIN — MIDAZOLAM HYDROCHLORIDE 2 MG: 1 INJECTION, SOLUTION INTRAMUSCULAR; INTRAVENOUS at 08:12

## 2020-01-28 RX ADMIN — FENTANYL CITRATE 50 MCG: 50 INJECTION, SOLUTION INTRAMUSCULAR; INTRAVENOUS at 08:41

## 2020-01-28 RX ADMIN — ONDANSETRON 4 MG: 2 INJECTION INTRAMUSCULAR; INTRAVENOUS at 08:50

## 2020-01-28 RX ADMIN — SENNOSIDES AND DOCUSATE SODIUM 1 TABLET: 8.6; 5 TABLET ORAL at 19:44

## 2020-01-28 ASSESSMENT — COGNITIVE AND FUNCTIONAL STATUS - GENERAL
HELP NEEDED FOR BATHING: 2 - A LOT
AFFECT: WFL
STANDING UP FROM CHAIR USING ARMS: 3 - A LITTLE
CLIMB 3 TO 5 STEPS WITH RAILING: 2 - A LOT
WALKING IN HOSPITAL ROOM: 3 - A LITTLE
MOVING TO AND FROM BED TO CHAIR: 3 - A LITTLE
TOILETING: 3 - A LITTLE
HELP NEEDED FOR PERSONAL GROOMING: 3 - A LITTLE
DRESSING REGULAR UPPER BODY CLOTHING: 3 - A LITTLE
DRESSING REGULAR LOWER BODY CLOTHING: 2 - A LOT
EATING MEALS: 4 - NONE
AFFECT: WFL

## 2020-01-28 NOTE — ANESTHESIOLOGIST PRE-PROCEDURE ATTESTATION
Pre-Procedure Patient Identification:  I am the Primary Anesthesiologist and have identified the patient on 01/28/20 at 7:54 AM.   I have confirmed the following procedure(s) KNEE TOTAL REVISION (R) will be performed by the following surgeon/proceduralist Jean Harman MD.

## 2020-01-28 NOTE — OP NOTE
Preoperative diagnosis: 1.  Painful failed right total knee arthroplasty     POSTOPERATIVE DIAGNOSIS:  Same as preop     PROCEDURE:  1) Revision right total knee arthroplasty consisting of entire femoral and tibial components.     SURGEON:  Jean Harman M.D.     ASSISTANT:  Miguel     ANESTHESIA:  General endotracheal     ESTIMATED BLOOD LOSS:  200 mL.     INTRAVENOUS FLUIDS:  Per anesthesia record.     URINE OUTPUT:  None.     COMPLICATIONS:  We did notice a nondisplaced crack on the medial side involving the femoral condyle into the metaphyseal region of the femur in a vertical orientation.  We treated this with 2 1.7 mm cables from [employer].    DRAINS:  None.     SPECIMENS:  5 tissue specimen sent for Gram stain and culture     TOTAL TOURNIQUET TIME:  118 minutes at 250 mmHg.     COMPONENTS USED:  This was a [employer] triathlon revision system.  On the femoral side we use a size 6 right TS femoral component with 5 mm distal augments medially and laterally, a 5 mm posterior medial augment and a 10 mm posterior lateral augment along with a size 19 mm x 150 mm press-fit stem and a size 6 femoral cone.    On the tibial side we used a size 6 universal tibial baseplate with 10 mm augments medially and laterally with a 16 mm x 150 mm press-fit stem and a size E. Gee.    INDICATIONS:  The patient is status post a total knee arthroplasty with progressive pain and swelling in the knee.  Preoperative work-up for infection was negative and there was concern for loosening of at least the tibial component.  We did discuss the risks and the benefits of revision knee replacement surgery.  He understood that risks of surgery included, but were not limited to cardiovascular and cardiopulmonary compromise, venous thromboembolism, infection, bleeding, neurovascular injury, persistent pain, disability, stiffness, loosening, fracture, and need for future surgical procedures. Despite these risks, I did feel that he was a candidate  for revision surgery given the history, physical examination, and radiographic findings and I felt that he could reasonably expect significant reduction in pain and improvement in quality of life with the operation. He understood and agreed, and elected to undergo the operation.     OPERATIVE FINDINGS:  Both the femoral and tibial components were grossly loose.  There was extensive synovitis.  He had a little bit of medial laxity, but the MCL was grossly intact.  The patella was well fixed.     DESCRIPTION OF PROCEDURE:  The patient was placed on the operating room table in the supine position. All of the bony prominences and neurovascular structures were well padded. We prepped and draped the operative lower extremity in the standard sterile fashion using sequential ChloraPrep solution and began by exsanguinating the limb with an Esmarch and inflating the tourniquet to 250 millimeters of mercury. We excised the old incision and made full-thickness medial and lateral skin flaps to allow for a standard medial parapatellar arthrotomy productive of benign-appearing synovial fluid. A complete synovectomy was performed and appropriate cultures were obtained. A standard exposure was then obtained, findings listed above, and the components were then removed in their entirety.  We then prepared the tibia by reaming for the stem and by reaming for the cone until we had a size that had excellent axial and rotational stability. We then sized the proximal tibia and made a freshening tibial cut with an oscillating saw. We then turned our attention toward the femur and prepared the femoral component for reaming for the stem and the cone and using the cutting blocks to make appropriate distal anterior, posterior and chamfer resections. We then placed trial components with a different combination of sizes, augments and polyethylene thicknesses until I felt that we had an excellent combination of range of motion and stability in  flexion, mid flexion and extension. Gaps were balanced.  At this point we noticed the crack in the femur and placed 2 cables which closed down the crack anatomically. Our patellar tracking was excellent. All trial components were then removed, and we prepared the bony surfaces for cement using pulsatile saline mixed with antibiotic and gauze drying. We first impacted the cones into place. We then impacted the components into place, cementing at the appropriate position and I removed all excess cement. We allowed the cement to fully cure in full extension and when the cement was fully cured, we re- trialed our construct and I felt that everything was appropriate, as it had been in the trial phase. We then profusely irrigated out the knee with pulsatile saline mixed with antibiotic and achieved hemostasis with electrocautery. We did achieve hemostasis prior to placement of the final polyethylene component by opening up the back of the knee. There was no pulsatile bleeding and no arterial bleeding was identified.      We then closed the capsule with a combination of #1 Vicryl and #2 Quill suture. The subcutaneous tissue was closed in 2 layers using #1 and 2-0 Vicryl suture and the skin was closed with a running 4-0 subcuticular Monocryl suture and Dermabond. Sterile dry and gently compressive dressings were placed. The patient was taken to the recovery room in good condition. Following the case, the patient had palpable pedal pulses with brisk capillary refill in the toes, which is consistent with the preoperative exam. I, Dr. Harman, was the attending for the case and was present for the key components of the procedure.

## 2020-01-28 NOTE — HOSPITAL COURSE
Deric is a 64 y.o. male admitted on 1/28/2020 with S/P revision of total knee, right. Principal problem is Status post revision of total replacement of right knee.    Past Medical History  Deric has a past medical history of Allergic rhinitis, Attention deficit disorder (ADD) in adult, Carpal tunnel syndrome (5/30/2018), Complaint of nasal congestion, Depression, Diverticulosis (9/17/2018), GERD (gastroesophageal reflux disease), COURT on CPAP, Osteoarthritis of both knees, and Polyp of colon (3/7/2018).    History of Present Illness   64 y.o. male status post right revision total knee arthroplasty w/ intra-operative femur fracture and placement of 2 cerclage wires Dr Harman 1/28, TTWB RLE

## 2020-01-28 NOTE — PLAN OF CARE
Problem: Adult Inpatient Plan of Care  Goal: Plan of Care Review  Outcome: Progressing  Flowsheets (Taken 1/28/2020 1617)  Progress: improving  Plan of Care Reviewed With: spouse; patient  Outcome Summary: Patient needs assist w/ LE adl's, bed mobility , STS plus RW mobility activities 2* decr strength/endurance plus R LE TTWB

## 2020-01-28 NOTE — ANESTHESIA PREPROCEDURE EVALUATION
Relevant Problems   GASTROINTESTINAL   (+) GERD (gastroesophageal reflux disease)      MUSCULOSKELETAL   (+) Osteoarthritis of knee      NEUROLOGY   (+) Carpal tunnel syndrome   (+) Depression      RESPIRATORY SYSTEM   (+) COURT (obstructive sleep apnea)       Anesthesia ROS/MED HX      Pulmonary    Sleep apnea  GI/Hepatic   GERD  ROS/MED HX Comments:    Neurology/Psychology: ADD       Past Surgical History:   Procedure Laterality Date   • BONE CYST EXCISION Left     Left humeral bone cyst excision   • COLONOSCOPY W/ POLYPECTOMY     • JOINT REPLACEMENT      RTKR 2014, partial left knee 2008   • VARICOCELECTOMY  1985   • VASECTOMY         Physical Exam    Airway   Mallampati: III   TM distance: >3 FB   Neck ROM: full  Cardiovascular - normal   Rhythm: regular   Rate: normalPulmonary - normal   clear to auscultation  Dental - normal        Anesthesia Plan    Plan: spinal    Technique: spinal   ASA 2  Anesthetic plan and risks discussed with: patient

## 2020-01-28 NOTE — ANESTHESIA PROCEDURE NOTES
Airway  Urgency: elective    Start Time: 1/28/2020 8:43 AM  Stop Time: 1/28/2020 8:43 AM    Difficult airway: moderately difficult glidescope intubation - excellent view, difficult maneuvering ETT to glottis secondary to large tongue.    General Information and Staff    Patient location during procedure: OR  Anesthesiologist: Jean Grigsby MD  Performed: anesthesiologist     Indications and Patient Condition  Indications for airway management: anesthesia  Sedation level: general  Preoxygenated: yes  Patient position: sniffing  Mask difficulty assessment: 2 - vent by mask + OA or adjuvant +/- NMBA    Final Airway Details  Final airway type: endotracheal airway      Successful airway: ETT    Successful intubation technique: video laryngoscopy  Facilitating devices/methods: intubating stylet  Endotracheal tube insertion site: oral  Blade: Pedro Pablo  Blade size: #3  ETT size (mm): 8.0  Cormack-Lehane Classification: grade I - full view of glottis  Placement verified by: chest auscultation and capnometry   Measured from: lips  Number of attempts at approach: 1  Atraumatic airway insertion

## 2020-01-28 NOTE — CONSULTS
Hospital Medicine Service -  Daily Progress Note       SUBJECTIVE   Interval History:   Patient seen and evaluated in the PACU. He notes that he has some pain and soreness in his L knee.   Patient denies HA, dizziness, CP, SOB, palpitations, Abdominal Pain, N/V, or any other symptoms.      OBJECTIVE      Vital signs in last 24 hours:  Temp:  [36.3 °C (97.4 °F)-36.8 °C (98.2 °F)] 36.3 °C (97.4 °F)  Heart Rate:  [72-74] 74  Resp:  [16-18] 16  BP: (125-132)/(60-82) 132/60    Intake/Output Summary (Last 24 hours) at 1/28/2020 1218  Last data filed at 1/28/2020 1200  Gross per 24 hour   Intake 1200 ml   Output --   Net 1200 ml       PHYSICAL EXAMINATION      Physical Exam   Constitutional: He is oriented to person, place, and time. He appears well-developed and well-nourished. No distress.   HENT:   Head: Normocephalic and atraumatic.   Eyes: Conjunctivae and EOM are normal. Right eye exhibits no discharge. Left eye exhibits no discharge. No scleral icterus.   Neck: Normal range of motion. Neck supple.   Cardiovascular: Normal rate, regular rhythm, normal heart sounds and intact distal pulses. Exam reveals no gallop and no friction rub.   No murmur heard.  Pulmonary/Chest: Effort normal and breath sounds normal. No stridor. No respiratory distress. He has no wheezes.   Abdominal: Soft. Bowel sounds are normal. He exhibits no distension. There is no tenderness. There is no guarding.   Musculoskeletal:   L Knee: dressing C/D/I, neurovascularly intact distally   Neurological: He is alert and oriented to person, place, and time.   Neuro exam nonfocal   Skin: Skin is warm and dry.   Psychiatric: He has a normal mood and affect.   Nursing note and vitals reviewed.     LINES, CATHETERS, DRAINS, AIRWAYS, AND WOUNDS   Lines, Drains, Airways, Wounds:  Peripheral IV 01/28/20 Anterior;Distal;Left Wrist (Active)   Number of days: 0       Surgical Incision Knee Right (Active)   Number of days: 0       Comments:      LABS / IMAGING  / TELE      Labs  Old outpatient labs from 1/16/2020 personally reviewed; Na 135, Cr 0.8.     ASSESSMENT AND PLAN      * Status post revision of total replacement of right knee  Assessment & Plan  POD# 0 s/p R total knee revision  - Pain control per orthopedics  - DVT ppx per orthopedics  - PT/OT  - Encourage IS  - Bowel regimen  - Follow post op labs    COURT (obstructive sleep apnea)  Assessment & Plan  Patient uses CPAP  - COURT protocol post-op  - Continue CPAP QHS and PRN  - Cautious use or narcotics or sedatives  - Encourage IS    Depression  Assessment & Plan  - Continue DEVORA Ramos C  1/28/2020

## 2020-01-28 NOTE — PROGRESS NOTES
Orthopaedics Post-op Check    [S]  No acute distress. Pain controlled.     [O]   Vitals:    01/28/20 1359   BP:    Pulse: (!) 55   Resp: 12   Temp:    SpO2: 98%       Physical Exam:  SILT  +DP/PT  +EHL/FHL/PF/DF  Dressing C/d/i    [A/P]   64 y.o. male status post right revision total knee arthroplasty w/ intra-operative femur fracture and placement of 2 cerclage wires, Day of Surgery, doing well    -DVT prophylaxis  -TTWB  -Analgesia  -Physical therapy    Radha Rivera MD

## 2020-01-28 NOTE — ASSESSMENT & PLAN NOTE
POD# 1s/p R total knee revision  - Pain control per orthopedics  - DVT ppx per orthopedics- asa bid  - PT/OT  - Encourage IS  - Bowel regimen  Labs are stable

## 2020-01-28 NOTE — PROGRESS NOTES
Patient: Titus Ray  Location: [facility] [id number]  MRN: [id number]  Today's date: 1/28/2020   Patient OOB in recliner w/ alarm activated & call bell nearby     Deric is a 64 y.o. male admitted on 1/28/2020 with S/P revision of total knee, right. Principal problem is Status post revision of total replacement of right knee.    Past Medical History  Deric has a past medical history of Allergic rhinitis, Attention deficit disorder (ADD) in adult, Carpal tunnel syndrome (5/30/2018), Complaint of nasal congestion, Depression, Diverticulosis (9/17/2018), GERD (gastroesophageal reflux disease), COURT on CPAP, Osteoarthritis of both knees, and Polyp of colon (3/7/2018).    History of Present Illness   64 y.o. male status post right revision total knee arthroplasty w/ intra-operative femur fracture and placement of 2 cerclage wires Dr Harman 1/28, TTWB RLE    OT Vitals    Date/Time Pulse SpO2 Pt Activity O2 Therapy BP [facility]   01/28/20 1617 63 96 % At rest None (Room air) 129/83 CP      OT Pain    Date/Time Pain Type Pref Pain Scale Side Location Rating: Rest Rating: Activity Interventions [facility]   01/28/20 1617 Pain Assessment number (Numeric Rating Pain Scale) Right knee 8 9 position adjusted CP          Prior Living Environment      Most Recent Value   Living Arrangements  house   Living Environment Comment  lives w/ spouse in 2 SH           Prior Level of Function      Most Recent Value   Ambulation  independent   Transferring  independent   Toileting  independent   Bathing  independent   Dressing  independent   Eating  independent   Prior Level of Function Comment  PTA : IND w/ adl's /mobility w/o device    Equipment Currently Used at Home  walker, front-wheeled          OT Evaluation and Treatment - 01/28/20 1617        Time Calculation    Start Time  1617     Stop Time  1637     Time Calculation (min)  20 min        Session Details    Document Type  initial evaluation     Mode of Treatment  occupational  therapy        General Information    Patient Profile Reviewed?  yes     General Observations of Patient  Sitting supine in head elevated bed      Existing Precautions/Restrictions  fall;weight bearing        Weight-Bearing Status    Right LE Weight-Bearing Status  toe touch weight-bearing (TTWB)        Occupational Profile    Reason for Services/Referral (Occupational Profile)  ADL / ambulation dysfx     Patient Goals (Occupational Profile)  to get stronger         Cognition/Psychosocial    Affect/Mental Status (Cognitive)  WFL     Orientation Status (Cognition)  oriented x 4     Follows Commands (Cognition)  follows multi-step commands        Sensory Assessment (Somatosensory)    Sensory Assessment (Somatosensory)  UE sensation intact        Range of Motion (ROM)    Range of Motion  ROM is WFL;bilateral upper extremities        Strength (Manual Muscle Testing)    Strength (Manual Muscle Testing)  strength is WFL;bilateral upper extremities        Bed Mobility    Bed Mobility  bed mobility (all) activities     [place], Supine to Sit  minimum assist (75% patient effort)     Assistive Device (Bed Mobility)  head of bed elevated        Transfers    Maintains Weight-Bearing Status (Transfers)  able to maintain weight-bearing status     Comment  MIN w/ RW  w/ R LE TTWB        Bed to Chair Transfer    [place], Bed to Chair  minimum assist (75% patient effort);1 person assist     Assistive Device  walker, front-wheeled     Comment  w/ TTWB R LE         Sit to Stand Transfer    [place], Sit to Stand Transfer  minimum assist (75% patient effort);1 person assist     Verbal Cues  hand placement;maintaining precautions;safety;technique     Assistive Device  walker, front-wheeled     Comment  w/ R LE TTWB         Stand to Sit Transfer    [place], Stand to Sit Transfer  minimum assist (75% patient effort)     Verbal Cues  hand placement;safety;technique     Assistive Device  walker, front-wheeled     Comment   w/ RLE TTWB         Balance    Balance Assessment  sitting static balance     Static Sitting Balance  WFL;sitting, edge of bed        Lower Body Dressing    Self-Performance  dons/doffs left sock;dons/doffs right sock     [place]  minimum assist (75% or more patient effort)     Comment  R LE TTWB         AM-PAC (TM) - ADL (Current Function)    Putting on and taking off regular lower body clothing?  2 - A Lot     Bathing?  2 - A Lot     Toileting?  3 - A Little     Putting on/taking off regular upper body clothing?  3 - A Little     How much help for taking care of personal grooming?  3 - A Little     Eating meals?  4 - None     AM-PAC (TM) ADL Score  17        Therapy Assessment/Plan (OT)    Patient/Family Therapy Goal Statement (OT)  to get stronger     OT Diagnosis  R TK revision      Rehab Potential (OT)  good, to achieve stated therapy goals     Therapy Frequency (OT)  3-5 times/wk     Problem List (OT)  strength        Progress Summary (OT)    Daily Outcome Statement (OT)  am pac 17: Patient needs assist w/ LE adl's, STS plus RW mobility activities  2* decr strength/endurance plus R LE TTWB         Therapy Plan Review/Discharge Plan (OT)    Therapy Plan Review (OT)  evaluation/treatment results reviewed;patient;spouse/significant other     Anticipated Equipment Needs At Discharge (OT)  bathing equipment;dressing equipment;walker, front-wheeled     OT Recommended Discharge Disposition  home with caregiver                   Education provided this session. See the Patient Education summary report for full details.         OT Goals      Most Recent Value   Bed Mobility Goal 1   Activity/Assistive Device  bed mobility activities, all, sit to supine, supine to sit at 01/28/2020 1617   [place]  modified independence at 01/28/2020 1617   Time Frame  by discharge at 01/28/2020 1617   Progress/Outcome  goal ongoing at 01/28/2020 1617   Transfer Goal 1   Activity/Assistive Device  sit-to-stand/stand-to-sit,  bed-to-chair/chair-to-bed, walker, front-wheeled at 01/28/2020 1617   [place]  modified independence at 01/28/2020 1617   Time Frame  by discharge at 01/28/2020 1617   Progress/Outcome  goal ongoing at 01/28/2020 1617   Dressing Goal 1   Activity/Adaptive Equipment  lower body dressing at 01/28/2020 1617   [place]  modified independence at 01/28/2020 1617   Time Frame  by discharge at 01/28/2020 1617   Progress/Outcome  goal ongoing at 01/28/2020 1617

## 2020-01-28 NOTE — ASSESSMENT & PLAN NOTE
Patient uses CPAP  - Continue CPAP QHS and PRN  - Cautious use or narcotics or sedatives  - Encourage IS

## 2020-01-28 NOTE — PROGRESS NOTES
Patient: Titus Ray  Location: [facility] 5PAV 5426  MRN: [id number]  Today's date: 1/28/2020     Pt seated in bedside chair at end of PT session, NAD. Call bell and phone in reach, chair alarm activated and nursing notified.     Deric is a 64 y.o. male admitted on 1/28/2020 with S/P revision of total knee, right. Principal problem is Status post revision of total replacement of right knee.    Past Medical History  Deric has a past medical history of Allergic rhinitis, Attention deficit disorder (ADD) in adult, Carpal tunnel syndrome (5/30/2018), Complaint of nasal congestion, Depression, Diverticulosis (9/17/2018), GERD (gastroesophageal reflux disease), COURT on CPAP, Osteoarthritis of both knees, and Polyp of colon (3/7/2018).    History of Present Illness   64 y.o. male status post right revision total knee arthroplasty w/ intra-operative femur fracture and placement of 2 cerclage wires Dr Harman 1/28, TTWB RLE    PT Vitals    Date/Time Pulse SpO2 Pt Activity O2 Therapy BP BP Method Pt Position [facility]   01/28/20 1615 63 96 % At rest None (Room air) 129/83 Automatic Lying LA      PT Pain    Date/Time Pain Type Pref Pain Scale Side Location Rating: Rest Rating: Activity Interventions [facility]   01/28/20 1615 Pain Assessment number (Numeric Rating Pain Scale) Right knee 8 9 position adjusted LA          Prior Living Environment      Most Recent Value   Living Arrangements  house  (Pended)    Living Environment Comment  lives w/ spouse in 2 SH   (Pended)           Prior Level of Function      Most Recent Value   Ambulation  independent   Transferring  independent   Toileting  independent   Bathing  independent   Dressing  independent   Eating  independent   Prior Level of Function Comment  PTA : IND w/ adl's /mobility w/o device   (Pended)    Equipment Currently Used at Home  walker, front-wheeled  (Pended)           PT Evaluation and Treatment - 01/28/20 1615        Time Calculation    Start Time  1615      Stop Time  1630     Time Calculation (min)  15 min        General Information    Patient Profile Reviewed?  yes     Onset of Illness/Injury or Date of Surgery  01/28/20     Referring Physician  Kimani     General Observations of Patient  pt received supine in bed, spouse present     Existing Precautions/Restrictions  fall;weight bearing;head of bed elevated 30 degrees        Weight-Bearing Status    Right LE Weight-Bearing Status  toe touch weight-bearing (TTWB)        Cognition/Psychosocial    Affect/Mental Status (Cognitive)  WFL     Orientation Status (Cognition)  oriented x 4     Follows Commands (Cognition)  follows multi-step commands        Sensory Assessment (Somatosensory)    Sensory Assessment (Somatosensory)  LE sensation intact        Range of Motion (ROM)    Range of Motion  right lower extremity ROM deficit     Right Lower Extremity (ROM)  knee     Knee, Right (ROM)  8-65        Strength (Manual Muscle Testing)    Strength (Manual Muscle Testing)  strength is WFL;left lower extremity        Bed Mobility    Bed Mobility  supine to sit to supine     [place], Supine to Sit  1 person assist;minimum assist (75% patient effort)     Assistive Device (Bed Mobility)  head of bed elevated     Comment (Bed Mobility)  assist with management of RLE due to increased pain        Transfers    Transfers  other (see comments)     Maintains Weight-Bearing Status (Transfers)  cues to maintain weight-bearing status     Comment  cues provided througout session to ensure TTWB to RLE        Sit to Stand Transfer    [place], Sit to Stand Transfer  1 person assist;minimum assist (75% patient effort);verbal cues     Verbal Cues  safety;proper use of assistive device;maintaining precautions     Assistive Device  walker, front-wheeled     Comment  cues for proper hand placement and to maintain TTWB to RLE        Stand to Sit Transfer    [place], Stand to Sit Transfer  1 person assist;minimum assist (75% patient  effort);verbal cues     Verbal Cues  safety;proper use of assistive device     Assistive Device  walker, front-wheeled     Comment  cues to reach back for hand rest        Gait Training    [place], Gait  1 person assist;minimum assist (75% or more patient effort)     Assistive Device  walker, front-wheeled     Distance in Feet  8 feet     Gait Pattern Utilized  step-to     Deviations/Abnormal Patterns (Gait)  stride length decreased;steppage;antalgic     Maintains Weight-Bearing Status  verbal cues to maintain     Comment  verbal cues to ensure TTWB to RLE, pt intermittently able to hop on LLE- bed to chair due to increased pain and WB restrictions        Stairs Training    [place], Stairs  other (see comments)    TBA at ortho class       Balance    Balance Assessment  sitting static balance     Static Sitting Balance  WFL;sitting, edge of bed        AM-PAC (TM) - Mobility (Current Function)    Turning from your back to your side while in a flat bed without using bedrails?  3 - A Little     Moving from lying on your back to sitting on the side of a flat bed without using bedrails?  3 - A Little     Moving to and from a bed to a chair?  3 - A Little     Standing up from a chair using your arms?  3 - A Little     To walk in a hospital room?  3 - A Little     Climbing 3-5 steps with a railing?  2 - A Lot     AM-PAC (TM) Mobility Score  17        Therapy Assessment/Plan (PT)    Rehab Potential (PT)  good, to achieve stated therapy goals     Criteria for Skilled Interventions Met (PT)  yes     Therapy Frequency (PT)  5-7 times/wk        Progress Summary (PT)    Daily Outcome Statement (PT)  1/28 Pt is a 64 year old male who presents s/p right revision total knee arthroplasty w/ intra-operative femur fracture and placement of 2 cerclage wires post op day zero; pt is TTWB RLE, provided comprehensive education regarding WB restrictions, patient tolerated OOB mobility with Ax1, will attend ortho class tomorrow and  rec DC home w/ assist        Therapy Plan Review/Discharge Plan (PT)    Anticipated Equipment Needs at Discharge (PT Eval)  none     PT Recommended Discharge Disposition  home with assist        Plan of Care Review    Plan of Care Reviewed With  patient;spouse                       Education provided this session. See the Patient Education summary report for full details.    PT Goals      Most Recent Value   Bed Mobility Goal 1   Activity/Assistive Device  bed mobility activities, all at 01/28/2020 1615   Time Frame  short-term goal (STG) at 01/28/2020 1615   Progress/Outcome  goal ongoing at 01/28/2020 1615   Transfer Goal 1   Activity/Assistive Device  sit-to-stand/stand-to-sit, walker, front-wheeled, car transfer at 01/28/2020 1615   [place]  modified independence at 01/28/2020 1615   Time Frame  short-term goal (STG) at 01/28/2020 1615   Progress/Outcome  goal ongoing at 01/28/2020 1615   Gait Training Goal 1   Activity/Assistive Device  gait (walking locomotion), walker, rolling at 01/28/2020 1615   [place]  modified independence at 01/28/2020 1615   Distance  150 at 01/28/2020 1615   Time Frame  short-term goal (STG) at 01/28/2020 1615   Progress/Outcome  goal ongoing at 01/28/2020 1615   Stairs Goal 1   Activity/Assistive Device  stairs, all skills at 01/28/2020 1615   [place]  modified independence at 01/28/2020 1615   Number of Stairs  5 at 01/28/2020 1615   Time Frame  short-term goal (STG) at 01/28/2020 1615   Progress/Outcome  goal ongoing at 01/28/2020 1615

## 2020-01-28 NOTE — ANESTHESIA POSTPROCEDURE EVALUATION
Patient: Titus Ray    Procedure Summary     Date:  01/28/20 Room / Location:  [facility] PAV OR 04 / [facility] OR PAV    Anesthesia Start:  0812 Anesthesia Stop:  1200    Procedure:  KNEE TOTAL REVISION (Right Knee) Diagnosis:       Pain in prosthetic joint, initial encounter (CMS/[facility])      (Painful Hardware)    Surgeon:  Jean Harman MD Responsible Provider:  Jean Grigsby MD    Anesthesia Type:  spinal ASA Status:  2          Anesthesia Type: spinal  PACU Vitals  1/28/2020 1152 - 1/28/2020 1252      1/28/2020  1155 1/28/2020  1200 1/28/2020  1229 1/28/2020  1236    BP:  --  132/60  --  129/63    Temp:  36.3 °C (97.4 °F)  --  --  --    Pulse:  --  74  --  (!) 59    Resp:  --  16  --  15    SpO2:  --  95 %  96 %  94 %              1/28/2020  1250             BP:  (!) 111/59       Temp:  --       Pulse:  (!) 59       Resp:  12       SpO2:  98 %               Anesthesia Post Evaluation    Pain management: satisfactory to patient  Patient location during evaluation: PACU  Level of consciousness: awake  Cardiovascular status: acceptable  Airway Patency: adequate  Respiratory status: acceptable  Hydration status: stable  Anesthetic complications: no

## 2020-01-28 NOTE — PLAN OF CARE
Problem: Adult Inpatient Plan of Care  Goal: Plan of Care Review  Outcome: Progressing  Flowsheets (Taken 1/28/2020 4990)  Progress: improving  Plan of Care Reviewed With: patient; spouse  Outcome Summary: PT lashell completed

## 2020-01-29 LAB
ANION GAP SERPL CALC-SCNC: 10 MEQ/L (ref 3–15)
BUN SERPL-MCNC: 23 MG/DL (ref 8–20)
CALCIUM SERPL-MCNC: 8.2 MG/DL (ref 8.9–10.3)
CHLORIDE SERPL-SCNC: 104 MEQ/L (ref 98–109)
CO2 SERPL-SCNC: 22 MEQ/L (ref 22–32)
CREAT SERPL-MCNC: 1 MG/DL
ERYTHROCYTE [DISTWIDTH] IN BLOOD BY AUTOMATED COUNT: 13.6 % (ref 11.6–14.4)
FUNGUS STAIN: NORMAL
GFR SERPL CREATININE-BSD FRML MDRD: >60 ML/MIN/1.73M*2
GLUCOSE SERPL-MCNC: 124 MG/DL (ref 70–99)
HCT VFR BLDCO AUTO: 35.3 % (ref 40.1–51)
HGB BLD-MCNC: 11.8 G/DL
MCH RBC QN AUTO: 29.1 PG (ref 28–33.2)
MCHC RBC AUTO-ENTMCNC: 33.4 G/DL (ref 32.2–36.5)
MCV RBC AUTO: 86.9 FL (ref 83–98)
PDW BLD AUTO: 10.5 FL (ref 9.4–12.4)
PLATELET # BLD AUTO: 227 K/UL
POTASSIUM SERPL-SCNC: 4.3 MEQ/L (ref 3.6–5.1)
RBC # BLD AUTO: 4.06 M/UL (ref 4.5–5.8)
RHODAMINE-AURAMINE STN SPEC: NORMAL
SODIUM SERPL-SCNC: 136 MEQ/L (ref 136–144)
WBC # BLD AUTO: 10.13 K/UL

## 2020-01-29 PROCEDURE — 63700000 HC SELF-ADMINISTRABLE DRUG: Performed by: NURSE PRACTITIONER

## 2020-01-29 PROCEDURE — 85027 COMPLETE CBC AUTOMATED: CPT | Performed by: NURSE PRACTITIONER

## 2020-01-29 PROCEDURE — 97150 GROUP THERAPEUTIC PROCEDURES: CPT | Mod: GP,CQ

## 2020-01-29 PROCEDURE — 97530 THERAPEUTIC ACTIVITIES: CPT | Mod: GP,CQ

## 2020-01-29 PROCEDURE — 36415 COLL VENOUS BLD VENIPUNCTURE: CPT | Performed by: NURSE PRACTITIONER

## 2020-01-29 PROCEDURE — 99232 SBSQ HOSP IP/OBS MODERATE 35: CPT | Performed by: HOSPITALIST

## 2020-01-29 PROCEDURE — 97535 SELF CARE MNGMENT TRAINING: CPT | Mod: GO

## 2020-01-29 PROCEDURE — 80048 BASIC METABOLIC PNL TOTAL CA: CPT | Performed by: NURSE PRACTITIONER

## 2020-01-29 PROCEDURE — 12000000 HC ROOM AND CARE MED/SURG

## 2020-01-29 PROCEDURE — 97150 GROUP THERAPEUTIC PROCEDURES: CPT | Mod: GO

## 2020-01-29 PROCEDURE — 63600000 HC DRUGS/DETAIL CODE: Performed by: NURSE PRACTITIONER

## 2020-01-29 RX ADMIN — ACETAMINOPHEN 650 MG: 325 TABLET ORAL at 08:55

## 2020-01-29 RX ADMIN — ASPIRIN 81 MG 81 MG: 81 TABLET ORAL at 08:55

## 2020-01-29 RX ADMIN — KETOROLAC TROMETHAMINE 15 MG: 15 INJECTION, SOLUTION INTRAMUSCULAR; INTRAVENOUS at 08:56

## 2020-01-29 RX ADMIN — SENNOSIDES AND DOCUSATE SODIUM 1 TABLET: 8.6; 5 TABLET ORAL at 08:56

## 2020-01-29 RX ADMIN — SENNOSIDES AND DOCUSATE SODIUM 1 TABLET: 8.6; 5 TABLET ORAL at 20:03

## 2020-01-29 RX ADMIN — CITALOPRAM HYDROBROMIDE 30 MG: 20 TABLET ORAL at 08:55

## 2020-01-29 RX ADMIN — POLYETHYLENE GLYCOL 3350 17 G: 17 POWDER, FOR SOLUTION ORAL at 08:56

## 2020-01-29 RX ADMIN — ASPIRIN 81 MG 81 MG: 81 TABLET ORAL at 20:03

## 2020-01-29 RX ADMIN — OXYCODONE HYDROCHLORIDE 10 MG: 5 TABLET ORAL at 05:18

## 2020-01-29 RX ADMIN — DEXAMETHASONE SODIUM PHOSPHATE 8 MG: 4 INJECTION, SOLUTION INTRA-ARTICULAR; INTRALESIONAL; INTRAMUSCULAR; INTRAVENOUS; SOFT TISSUE at 05:18

## 2020-01-29 RX ADMIN — PANTOPRAZOLE SODIUM 20 MG: 20 TABLET, DELAYED RELEASE ORAL at 08:55

## 2020-01-29 RX ADMIN — ACETAMINOPHEN 650 MG: 325 TABLET ORAL at 21:02

## 2020-01-29 RX ADMIN — ACETAMINOPHEN 650 MG: 325 TABLET ORAL at 04:07

## 2020-01-29 RX ADMIN — KETOROLAC TROMETHAMINE 15 MG: 15 INJECTION, SOLUTION INTRAMUSCULAR; INTRAVENOUS at 16:41

## 2020-01-29 RX ADMIN — CEFAZOLIN SODIUM IN SODIUM CHLORIDE 0.9% IV SOLN 2 GM/100ML 2 G: 2-0.9/1 SOLUTION at 00:17

## 2020-01-29 RX ADMIN — OXYCODONE HYDROCHLORIDE 5 MG: 5 TABLET ORAL at 21:03

## 2020-01-29 RX ADMIN — CETIRIZINE HYDROCHLORIDE 5 MG: 5 TABLET ORAL at 21:03

## 2020-01-29 RX ADMIN — ACETAMINOPHEN 650 MG: 325 TABLET ORAL at 16:41

## 2020-01-29 RX ADMIN — OXYCODONE HYDROCHLORIDE 10 MG: 5 TABLET ORAL at 11:12

## 2020-01-29 RX ADMIN — KETOROLAC TROMETHAMINE 15 MG: 15 INJECTION, SOLUTION INTRAMUSCULAR; INTRAVENOUS at 21:03

## 2020-01-29 RX ADMIN — KETOROLAC TROMETHAMINE 15 MG: 15 INJECTION, SOLUTION INTRAMUSCULAR; INTRAVENOUS at 04:07

## 2020-01-29 ASSESSMENT — COGNITIVE AND FUNCTIONAL STATUS - GENERAL
HELP NEEDED FOR PERSONAL GROOMING: 3 - A LITTLE
WALKING IN HOSPITAL ROOM: 3 - A LITTLE
DRESSING REGULAR UPPER BODY CLOTHING: 3 - A LITTLE
HELP NEEDED FOR PERSONAL GROOMING: 3 - A LITTLE
STANDING UP FROM CHAIR USING ARMS: 3 - A LITTLE
HELP NEEDED FOR BATHING: 3 - A LITTLE
CLIMB 3 TO 5 STEPS WITH RAILING: 2 - A LOT
HELP NEEDED FOR BATHING: 3 - A LITTLE
DRESSING REGULAR UPPER BODY CLOTHING: 4 - NONE
TOILETING: 3 - A LITTLE
AFFECT: WFL
MOVING TO AND FROM BED TO CHAIR: 3 - A LITTLE
STANDING UP FROM CHAIR USING ARMS: 4 - NONE
EATING MEALS: 4 - NONE
CLIMB 3 TO 5 STEPS WITH RAILING: 2 - A LOT
WALKING IN HOSPITAL ROOM: 3 - A LITTLE
DRESSING REGULAR LOWER BODY CLOTHING: 3 - A LITTLE
TOILETING: 3 - A LITTLE
EATING MEALS: 4 - NONE
AFFECT: WFL
DRESSING REGULAR LOWER BODY CLOTHING: 3 - A LITTLE
MOVING TO AND FROM BED TO CHAIR: 3 - A LITTLE

## 2020-01-29 NOTE — PLAN OF CARE
Problem: Adult Inpatient Plan of Care  Goal: Plan of Care Review  Outcome: Progressing  Flowsheets (Taken 1/29/2020 0457)  Progress: improving  Plan of Care Reviewed With: patient  Outcome Summary: OOB x1 assist with walker. Pain controlled with current pain regimen: tylenol/toradol ATC with PRN oxycodone 10mg. Voiding without difficulty.      Oral or Nutrition Support Intake

## 2020-01-29 NOTE — PLAN OF CARE
Problem: Adult Inpatient Plan of Care  Goal: Plan of Care Review  Outcome: Progressing  Flowsheets (Taken 1/29/2020 1520)  Outcome Summary: TTWB maintained.Oxycodone for pain with good relief.Mepilex to R knee C/D/I

## 2020-01-29 NOTE — PROGRESS NOTES
Patient: Titus Ray  Location: [facility] 5PAV 5426  MRN: [id number]  Today's date: 1/29/2020    Pt left seated in recliner chair w/therapy aide to return to room    Deric is a 64 y.o. male admitted on 1/28/2020 with S/P revision of total knee, right. Principal problem is Status post revision of total replacement of right knee.    Past Medical History  Deric has a past medical history of Allergic rhinitis, Attention deficit disorder (ADD) in adult, Carpal tunnel syndrome (5/30/2018), Complaint of nasal congestion, Depression, Diverticulosis (9/17/2018), GERD (gastroesophageal reflux disease), COURT on CPAP, Osteoarthritis of both knees, and Polyp of colon (3/7/2018).    History of Present Illness   64 y.o. male status post right revision total knee arthroplasty w/ intra-operative femur fracture and placement of 2 cerclage wires Dr Harman 1/28, TTWB RLE    PT Pain    Date/Time Pain Type Pref Pain Scale Side Location Rating: Rest Rating: Activity Interventions [facility]   01/29/20 1201 Pain Assessment number (Numeric Rating Pain Scale) Right knee 5 6 position adjusted;prescribed exercises encouraged MG          Prior Living Environment      Most Recent Value   Living Arrangements  house   Living Environment Comment  lives w/ spouse in 2 SH           Prior Level of Function      Most Recent Value   Ambulation  independent   Transferring  independent   Toileting  independent   Bathing  independent   Dressing  independent   Eating  independent   Prior Level of Function Comment  PTA : IND w/ adl's /mobility w/o device    Equipment Currently Used at Home  walker, front-wheeled          PT Evaluation and Treatment - 01/29/20 1201        Time Calculation    Start Time  1201     Stop Time  1305     Time Calculation (min)  64 min        Session Details    Document Type  daily treatment/progress note     Mode of Treatment  physical therapy;group therapy        General Information    Patient Profile Reviewed?  yes      Onset of Illness/Injury or Date of Surgery  01/28/20     Referring Physician  Kimani     General Observations of Patient  Pt received in recliner chair in ortho group     Existing Precautions/Restrictions  fall;weight bearing        Weight-Bearing Status    Right LE Weight-Bearing Status  toe touch weight-bearing (TTWB)        Cognition/Psychosocial    Affect/Mental Status (Cognitive)  WFL     Orientation Status (Cognition)  oriented x 4     Follows Commands (Cognition)  follows multi-step commands;repetition of directions required        Bed Mobility    Comment (Bed Mobility)  Completed w/OT        Transfers    Transfers  --        Sit to Stand Transfer    [place], Sit to Stand Transfer  close supervision;verbal cues     Verbal Cues  maintaining precautions     Assistive Device  walker, front-wheeled     Comment  Cues on WB status, slow, effortful rise, but able to complete        Stand to Sit Transfer    [place], Stand to Sit Transfer  close supervision;verbal cues     Verbal Cues  technique     Assistive Device  walker, front-wheeled     Comment  Cues on controlled descent        Car Transfer    [place], Car Transfer  not tested     Comment  Will assess before DC. Not completed today, as pt needed a break from completing other tasks        Gait Training    [place], Gait  close supervision;verbal cues     Assistive Device  walker, front-wheeled     Distance in Feet  80 feet     Gait Pattern Utilized  step-to     Deviations/Abnormal Patterns (Gait)  antalgic;gait speed decreased;step length decreased     Maintains Weight-Bearing Status  verbal cues to maintain     Advanced Gait Activity  curb negotiation     Comment  Pt req'd cueing on technique to manage TTWB status. Edu to avoid heel strike and rely on UE strength. Pt able to improve w/practice        Curb Negotiation (Mobility)    [place]  minimum assist (75% or more patient effort);verbal cues     Comment  MAX cues on TTWB status.  Pt struggled w/UE WB on RW, as it was placed up on step slightly forward, req'd therapist assist to hold RW in place and also to maintain WB status during brief SLS component of task. Currently unsteady and poor ability to limit WB during ascent. He would benefit from trial w/crutches, but he reported feeling uncomfortable and uncoordinated with them. Will trial at next session to assess for safety.        Stairs Training    [place], Stairs  not tested     Comment  Pt unable to maintain WB status on curb, did not attempt stairs        Therapeutic Exercise    Therapeutic Exercise  lower extremity        Lower Extremity (Therapeutic Exercise)    Exercise Position/Type (LE Therapeutic Exercise)  seated;AROM (active range of motion);concentric isotonic contraction     General Exercise (LE Therapeutic Exercise)  hamstring sets;quad sets;LAQ (long arc quad);gluteal sets;heel slides;marching while seated     Range of Motion Exercises (LE Therapeutic Exercise)  ankle dorsiflexion/plantarflexion;hip abduction/adduction     Reps and Sets (LE Therapeutic Exercise)  1x10 each, 3 sec holds for quad sets, ham sets, glute sets and heel slides     Comment (LE Therapeutic Exercise)  Edu on continued mobility at least 1x per hour to improve circulation and promote early intervention into safer functional mobility. Demo and tactile feedback provided on technique, handout referenced.        AM-PAC (TM) - Mobility (Current Function)    Turning from your back to your side while in a flat bed without using bedrails?  4 - None     Moving from lying on your back to sitting on the side of a flat bed without using bedrails?  4 - None     Moving to and from a bed to a chair?  3 - A Little     Standing up from a chair using your arms?  4 - None     To walk in a hospital room?  3 - A Little     Climbing 3-5 steps with a railing?  2 - A Lot     AM-PAC (TM) Mobility Score  20        Therapy Assessment/Plan (PT)    Rehab Potential (PT)  good,  to achieve stated therapy goals     Therapy Frequency (PT)  5-7 times/wk        Progress Summary (PT)    Daily Outcome Statement (PT)  1/29: Pt seen in ortho group. Limited from task completions 2/2 TTWB status. Demo's improvements on ambulation w/continued practice, but unable to safely manage negotiation of curb w/out assist. Rec further PT to trial use of other ADs and provide further practice on steps. Anticipate DC home pending ability to maintain TTWB status, else would consider continued therpeutic intervention before safe DC home.         Therapy Plan Review/Discharge Plan (PT)    Anticipated Equipment Needs at Discharge (PT Eval)  none     PT Recommended Discharge Disposition  home with assist    Pending                      Education provided this session. See the Patient Education summary report for full details.    PT Goals      Most Recent Value   Bed Mobility Goal 1   Activity/Assistive Device  bed mobility activities, all at 01/28/2020 1615   Time Frame  short-term goal (STG) at 01/28/2020 1615   Progress/Outcome  goal ongoing at 01/28/2020 1615   Transfer Goal 1   Activity/Assistive Device  sit-to-stand/stand-to-sit, walker, front-wheeled, car transfer at 01/28/2020 1615   [place]  modified independence at 01/28/2020 1615   Time Frame  short-term goal (STG) at 01/28/2020 1615   Progress/Outcome  goal ongoing at 01/28/2020 1615   Gait Training Goal 1   Activity/Assistive Device  gait (walking locomotion), walker, rolling at 01/28/2020 1615   [place]  modified independence at 01/28/2020 1615   Distance  150 at 01/28/2020 1615   Time Frame  short-term goal (STG) at 01/28/2020 1615   Progress/Outcome  goal ongoing at 01/28/2020 1615   Stairs Goal 1   Activity/Assistive Device  stairs, all skills at 01/28/2020 1615   [place]  modified independence at 01/28/2020 1615   Number of Stairs  5 at 01/28/2020 1615   Time Frame  short-term goal (STG) at 01/28/2020 1615   Progress/Outcome  goal  ongoing at 01/28/2020 0583

## 2020-01-29 NOTE — PATIENT CARE CONFERENCE
Care Progression Rounds Note  Date: 1/29/2020  Time: 10:24 AM     Patient Name: Titus Ray     Medical Record Number: [id number]   YOB: 1955  Sex: Male      Room/Bed: 5426    Admitting Diagnosis: Pain in prosthetic joint, initial encounter (CMS/[facility]) [T84.84XA]  S/P revision of total knee, right [Z96.651]   Admit Date/Time: 1/28/2020  7:23 AM    Primary Diagnosis: Status post revision of total replacement of right knee  Principal Problem: Status post revision of total replacement of right knee    GMLOS: pending  Anticipated Discharge Date: 1/29/2020    AM-PAC  Mobility Score: 17    Discharge Planning:  Living Arrangements: house    Barriers to Discharge:  Barriers to Discharge: Therapy update pending    Participants:  advanced practice provider, , nursing, physical therapy, social work/services

## 2020-01-29 NOTE — PROGRESS NOTES
Hospital Medicine Service -  Daily Progress Note       SUBJECTIVE   Interval History: still having significant pain in the right knee  Denies any chest pain or sob     OBJECTIVE      Vital signs in last 24 hours:  Temp:  [36.3 °C (97.3 °F)-36.9 °C (98.4 °F)] 36.8 °C (98.2 °F)  Heart Rate:  [53-74] 72  Resp:  [12-21] 18  BP: ()/(54-83) 113/73    Intake/Output Summary (Last 24 hours) at 1/29/2020 0903  Last data filed at 1/29/2020 0527  Gross per 24 hour   Intake 2276 ml   Output 900 ml   Net 1376 ml       PHYSICAL EXAMINATION      Physical Exam   General appearance: alert, appears stated age, cooperative, non-toxic  Head: normocephalic, without obvious abnormality, atraumatic  Eyes: conjunctivae clear. PERRL, EOMI's intact.  Lungs: clear to auscultation bilaterally   Heart: regular rate and rhythm, S1, S2 normal,   Abdomen: Nondistended, +BS, soft, non-tender, no masses palpable   Extremities: right knee is dressed  Pulses: 2+ and symmetric B/L DP  Neurologic: Alert and oriented X 3, no focal deficits  Skin: intact, no rashes or lesions       LINES, CATHETERS, DRAINS, AIRWAYS, AND WOUNDS   Lines, Drains, Airways, Wounds:  Peripheral IV 01/28/20 Anterior;Distal;Left Wrist (Active)   Number of days: 1       Surgical Incision Knee Right (Active)   Number of days: 1       Comments:      LABS / IMAGING / TELE      Labs  Lab Results   Component Value Date    WBC 10.13 01/29/2020    HGB 11.8 (L) 01/29/2020    HCT 35.3 (L) 01/29/2020     01/29/2020    CHOL 198 03/06/2018    TRIG 103 03/06/2018    HDL 62 03/06/2018    ALT 22 03/06/2018    AST 23 03/06/2018     01/29/2020    K 4.3 01/29/2020     01/29/2020    CREATININE 1.0 01/29/2020    BUN 23 (H) 01/29/2020    CO2 22 01/29/2020    TSH 1.760 03/06/2018    PSA 0.8 03/06/2018    HGBA1C 5.4 01/05/2016       Imaging  I have independently reviewed the pertinent imaging from the last 24 hrs.    ECG/Telemetry  I have independently reviewed the telemetry.  No events for the last 24 hours.    ASSESSMENT AND PLAN      * Status post revision of total replacement of right knee  Assessment & Plan  POD# 1s/p R total knee revision  - Pain control per orthopedics  - DVT ppx per orthopedics- asa bid  - PT/OT  - Encourage IS  - Bowel regimen  Labs are stable    COURT (obstructive sleep apnea)  Assessment & Plan  Patient uses CPAP  - Continue CPAP QHS and PRN  - Cautious use or narcotics or sedatives  - Encourage IS    Depression  Assessment & Plan  - Continue Celexa.         VTE Assessment: [place]    VTE Prophylaxis Plan: asa bid   Code Status: Full Code  Estimated Discharge Date: 1/30/2020  Disposition Planning: as per ortho     Galileo Murphy MD  1/29/2020

## 2020-01-29 NOTE — PLAN OF CARE
Problem: Adult Inpatient Plan of Care  Goal: Plan of Care Review  Outcome: Progressing  Flowsheets (Taken 1/29/2020 1227)  Progress: improving  Plan of Care Reviewed With: patient  Outcome Summary: Patient needs CL SUP w/ bed mobility , STS plus RW mobility activities w/ frequent cueing for R LE TTWB

## 2020-01-29 NOTE — PLAN OF CARE
Problem: Adult Inpatient Plan of Care  Goal: Plan of Care Review  Outcome: Progressing  Flowsheets (Taken 1/29/2020 8258)  Progress: improving  Plan of Care Reviewed With: patient  Outcome Summary: PT session completed

## 2020-01-29 NOTE — PROGRESS NOTES
Patient: Titus Ray  Location: [facility] 5PAV 5426  MRN: [id number]  Today's date: 1/29/2020     Patient sitting in recliner in ortho gym class     Deric is a 64 y.o. male admitted on 1/28/2020 with S/P revision of total knee, right. Principal problem is Status post revision of total replacement of right knee.    Past Medical History  Deric has a past medical history of Allergic rhinitis, Attention deficit disorder (ADD) in adult, Carpal tunnel syndrome (5/30/2018), Complaint of nasal congestion, Depression, Diverticulosis (9/17/2018), GERD (gastroesophageal reflux disease), COURT on CPAP, Osteoarthritis of both knees, and Polyp of colon (3/7/2018).    History of Present Illness   64 y.o. male status post right revision total knee arthroplasty w/ intra-operative femur fracture and placement of 2 cerclage wires Dr Harman 1/28, TTWB RLE    OT Pain    Date/Time Pain Type Pref Pain Scale Side Location Rating: Rest Rating: Activity Interventions [facility]   01/29/20 1227 Pain Assessment number (Numeric Rating Pain Scale) Right knee 5 6 position adjusted CP          Prior Living Environment      Most Recent Value   Living Arrangements  house   Living Environment Comment  lives w/ spouse in 2 SH           Prior Level of Function      Most Recent Value   Ambulation  independent   Transferring  independent   Toileting  independent   Bathing  independent   Dressing  independent   Eating  independent   Prior Level of Function Comment  PTA : IND w/ adl's /mobility w/o device    Equipment Currently Used at Home  walker, front-wheeled          OT Evaluation and Treatment - 01/29/20 1227        Time Calculation    Start Time  1227     Stop Time  1310     Time Calculation (min)  43 min        Session Details    Document Type  daily treatment/progress note     Mode of Treatment  group therapy;occupational therapy        General Information    Patient Profile Reviewed?  yes     General Observations of Patient  Patient  sitting in a recliner in ortho gym class      Existing Precautions/Restrictions  fall;weight bearing        Weight-Bearing Status    Right LE Weight-Bearing Status  toe touch weight-bearing (TTWB)        Occupational Profile    Reason for Services/Referral (Occupational Profile)  ADL /ambulation dysfx     Patient Goals (Occupational Profile)  to get stronger        Cognition/Psychosocial    Affect/Mental Status (Cognitive)  WFL     Orientation Status (Cognition)  oriented x 4     Follows Commands (Cognition)  follows multi-step commands        Range of Motion (ROM)    Range of Motion  ROM is WFL;bilateral upper extremities        Strength (Manual Muscle Testing)    Strength (Manual Muscle Testing)  strength is WFL;bilateral upper extremities        Bed Mobility    Bed Mobility  bed mobility (all) activities     [place], Supine to Sit  supervision     [place], Sit to Supine  supervision     Comment (Bed Mobility)  flat surface        Transfers    Maintains Weight-Bearing Status (Transfers)  cues to maintain weight-bearing status     Comment  CL SP w/ RW plus R LE TTWB        Bed to Chair Transfer    [place], Bed to Chair  close supervision     Assistive Device  walker, front-wheeled     Comment  R LE TTWB         Chair to Bed Transfer    [place], Chair to Bed  close supervision     Assistive Device  walker, front-wheeled     Comment  R LE TTWB        Sit to Stand Transfer    [place], Sit to Stand Transfer  verbal cues;close supervision     Assistive Device  walker, front-wheeled     Comment  R LE TTWB         Stand to Sit Transfer    [place], Stand to Sit Transfer  verbal cues;close supervision     Verbal Cues  technique     Assistive Device  walker, front-wheeled     Comment  R LE TTWB        Lower Body Dressing    Self-Performance  dons/doffs left sock;dons/doffs right sock     [place]  close supervision     Comment  R LE TTWB        AM-PAC (TM) - ADL (Current Function)    Putting  on and taking off regular lower body clothing?  3 - A Little     Bathing?  3 - A Little     Toileting?  3 - A Little     Putting on/taking off regular upper body clothing?  4 - None     How much help for taking care of personal grooming?  3 - A Little     Eating meals?  4 - None     AM-PAC (TM) ADL Score  20        Therapy Assessment/Plan (OT)    Patient/Family Therapy Goal Statement (OT)  to get stronger     OT Diagnosis  R  TKRevision     Rehab Potential (OT)  good, to achieve stated therapy goals     Therapy Frequency (OT)  3-5 times/wk     Problem List (OT)  strength        Progress Summary (OT)    Daily Outcome Statement (OT)  AM pac 20 : Patient  needs CL SUP w/ Cues for R LE TTWB w/ bed mobility, LE adl's, STS plus RW mobility activities         Therapy Plan Review/Discharge Plan (OT)    Therapy Plan Review (OT)  care plan/treatment goals reviewed     Anticipated Equipment Needs At Discharge (OT)  bathing equipment;dressing equipment;walker, front-wheeled     OT Recommended Discharge Disposition  home with caregiver                   Education provided this session. See the Patient Education summary report for full details.         OT Goals      Most Recent Value   Bed Mobility Goal 1   Activity/Assistive Device  bed mobility activities, all, sit to supine, supine to sit at 01/29/2020 1227   [place]  modified independence at 01/29/2020 1227   Time Frame  by discharge at 01/29/2020 1227   Progress/Outcome  goal ongoing at 01/29/2020 1227   Transfer Goal 1   Activity/Assistive Device  sit-to-stand/stand-to-sit, bed-to-chair/chair-to-bed at 01/29/2020 1227   [place]  modified independence at 01/29/2020 1227   Time Frame  by discharge at 01/29/2020 1227   Progress/Outcome  goal ongoing at 01/29/2020 1227   Dressing Goal 1   Activity/Adaptive Equipment  lower body dressing at 01/29/2020 1227   [place]  modified independence at 01/29/2020 1227   Time Frame  by discharge at 01/29/2020 1227    Progress/Outcome  goal ongoing at 01/29/2020 2747

## 2020-01-29 NOTE — PLAN OF CARE
Problem: Adult Inpatient Plan of Care  Goal: Readiness for Transition of Care  Outcome: Progressing     Problem: Adult Inpatient Plan of Care  Goal: Readiness for Transition of Care  Intervention: Mutually Develop Transition Plan  Flowsheets (Taken 1/29/2020 1207)  Anticipated Discharge Disposition: other (see comments) (To be determined after physical therapy session today..)  Equipment Needed After Discharge: walker, front-wheeled  Equipment Currently Used at Home: walker, front-wheeled  Transportation Concerns: car, none  Current Discharge Risk: dependent with mobility/activities of daily living  Readmission Within the Last 30 Days: no previous admission in last 30 days  Patient/Family Anticipated Services at Transition: other (see comments) (To be determined after physical therapy session .)  Patient/Family Anticipates Transition to: other (see comments) (To be determined after physical therapy session.)  Transportation Anticipated: family or friend will provide  Concerns to be Addressed: discharge planning   Patient scheduled for 11:30 AM gym class today. Patient wants to see how he does in gym class today prior to completing discharge planning.     Discussed with physical therapy. Will do another PT session in AM. Possibly will need snf. [facility] choices [facility]. Referrals sent. Will finalize discharge plan tomorrow after PT session. Patient aware and in agreement.

## 2020-01-29 NOTE — PROGRESS NOTES
Orthopaedics Progress Note     [S]  No acute distress. Pain controlled. Sitting in chair this morning comfortable.    [O]   Vitals:    01/29/20 0517   BP: (!) 93/54   Pulse:    Resp:    Temp:    SpO2: 97%       Physical Exam:  SILT  +DP/PT  +EHL/FHL/PF/DF  Dressing C/d/i    [A/P]   64 y.o. male status post right revision total knee arthroplasty w/ intra-operative femur fracture and placement of 2 cerclage wires, 1 Day Post-Op, doing well    -DVT prophylaxis  -TTWB  -Analgesia  -Physical therapy    Radha Rivera MD

## 2020-01-30 VITALS
HEIGHT: 70 IN | RESPIRATION RATE: 17 BRPM | HEART RATE: 68 BPM | DIASTOLIC BLOOD PRESSURE: 72 MMHG | BODY MASS INDEX: 36.65 KG/M2 | OXYGEN SATURATION: 99 % | TEMPERATURE: 98.4 F | WEIGHT: 256 LBS | SYSTOLIC BLOOD PRESSURE: 132 MMHG

## 2020-01-30 PROCEDURE — 97116 GAIT TRAINING THERAPY: CPT | Mod: GP

## 2020-01-30 PROCEDURE — 63600000 HC DRUGS/DETAIL CODE: Performed by: NURSE PRACTITIONER

## 2020-01-30 PROCEDURE — 63700000 HC SELF-ADMINISTRABLE DRUG: Performed by: NURSE PRACTITIONER

## 2020-01-30 PROCEDURE — 97150 GROUP THERAPEUTIC PROCEDURES: CPT | Mod: GO

## 2020-01-30 RX ORDER — OXYCODONE HYDROCHLORIDE 5 MG/1
5-10 TABLET ORAL EVERY 4 HOURS PRN
Qty: 30 TABLET | Refills: 0 | Status: SHIPPED | OUTPATIENT
Start: 2020-01-30 | End: 2020-01-30 | Stop reason: SDUPTHER

## 2020-01-30 RX ORDER — AMOXICILLIN 250 MG
1 CAPSULE ORAL 2 TIMES DAILY
Qty: 60 TABLET | Refills: 0
Start: 2020-01-30 | End: 2020-10-09 | Stop reason: ALTCHOICE

## 2020-01-30 RX ORDER — OXYCODONE HYDROCHLORIDE 5 MG/1
5-10 TABLET ORAL EVERY 4 HOURS PRN
Qty: 30 TABLET | Refills: 0 | Status: SHIPPED | OUTPATIENT
Start: 2020-01-30 | End: 2020-02-04

## 2020-01-30 RX ORDER — ACETAMINOPHEN 325 MG/1
650 TABLET ORAL EVERY 6 HOURS PRN
Start: 2020-01-30 | End: 2020-02-29

## 2020-01-30 RX ORDER — NAPROXEN SODIUM 220 MG/1
81 TABLET, FILM COATED ORAL 2 TIMES DAILY
Qty: 60 TABLET | Refills: 0
Start: 2020-01-30 | End: 2022-10-19

## 2020-01-30 RX ORDER — POLYETHYLENE GLYCOL 3350 17 G/17G
17 POWDER, FOR SOLUTION ORAL DAILY PRN
Start: 2020-01-30 | End: 2020-10-09 | Stop reason: ALTCHOICE

## 2020-01-30 RX ADMIN — OXYCODONE HYDROCHLORIDE 5 MG: 5 TABLET ORAL at 10:16

## 2020-01-30 RX ADMIN — ACETAMINOPHEN 650 MG: 325 TABLET ORAL at 04:24

## 2020-01-30 RX ADMIN — ASPIRIN 81 MG 81 MG: 81 TABLET ORAL at 08:50

## 2020-01-30 RX ADMIN — SENNOSIDES AND DOCUSATE SODIUM 1 TABLET: 8.6; 5 TABLET ORAL at 08:50

## 2020-01-30 RX ADMIN — POLYETHYLENE GLYCOL 3350 17 G: 17 POWDER, FOR SOLUTION ORAL at 08:50

## 2020-01-30 RX ADMIN — CITALOPRAM HYDROBROMIDE 30 MG: 20 TABLET ORAL at 08:50

## 2020-01-30 RX ADMIN — PANTOPRAZOLE SODIUM 20 MG: 20 TABLET, DELAYED RELEASE ORAL at 08:50

## 2020-01-30 RX ADMIN — KETOROLAC TROMETHAMINE 15 MG: 15 INJECTION, SOLUTION INTRAMUSCULAR; INTRAVENOUS at 04:24

## 2020-01-30 ASSESSMENT — COGNITIVE AND FUNCTIONAL STATUS - GENERAL
MOVING TO AND FROM BED TO CHAIR: 3 - A LITTLE
WALKING IN HOSPITAL ROOM: 3 - A LITTLE
AFFECT: WFL
CLIMB 3 TO 5 STEPS WITH RAILING: 2 - A LOT
STANDING UP FROM CHAIR USING ARMS: 3 - A LITTLE

## 2020-01-30 NOTE — DISCHARGE INSTRUCTIONS
Weight bearing:  Please remain toe touch weight bearing right lower extremity.     DVT Prophylaxis:   -Continue with Aspirin 81 mg by mouth twice daily X 4 weeks for blood clot prevention.    Incision Care:  -Please remove your surgical dressing on 2/2/2020. At that time if the wound is dry and not draining, you can leave it uncovered, open to air. If there is drainage, please place 4x4s covered by paper tape over your incision.  -Please do not submerge incision in water, no baths, no swimming, no pools, no hot tubs, etc.   -Please keep incision clean and dry. Once your dressing has been removed, do not scrub the incision in the shower and pat dry with a clean towel not used to dry your body.   -Please make sure your incision(s) are checked for signs and symptoms of infection: If any of the below should occur, please call the office:   -drainage from incision site, opening of incision, increased redness and/or tenderness, fevers greater than 101, flu-like symptoms.   -Please call office or go to ED with any thigh/calf pain or swelling in legs, chest pain, chest congestion, problems with breathing.     Constipation/Pain Medication:   -Take your pain medication with food. Do not drive while taking pain medication.   -Pain medications may cause constipation.   -If you have not had a bowel movement in 3 days please call the office   - Please take Senna-Colace as prescribed. Hold for loose stool. If you are having difficulties having a bowel   movement or haven't had bowel movement in 2 days, please add over the counter miralax and take as directed on package.   -Drink plenty of fluids and eat fresh fruits and vegetables.   -DO NOT SMOKE! Smoking is not healthy for your healing process.

## 2020-01-30 NOTE — PROGRESS NOTES
Orthopaedics Progress Note     [S]  No acute distress. Pain controlled. Sitting in chair this morning comfortable.    [O]   Vitals:    01/30/20 0435   BP:    Pulse:    Resp:    Temp:    SpO2: 99%       Physical Exam:  SILT  +DP/PT  +EHL/FHL/PF/DF  Dressing C/d/i    [A/P]   64 y.o. male status post right revision total knee arthroplasty w/ intra-operative femur fracture and placement of 2 cerclage wires, 2 Days Post-Op, doing well    -DVT prophylaxis  -TTWB  -Analgesia  -Physical therapy  -follow cultures  -dispo planning    Radha Rivera MD

## 2020-01-30 NOTE — PROGRESS NOTES
Patient: Titus Ray  Location: [facility] 5PAV 5426  MRN: [id number]  Today's date: 1/30/2020     Pt seated in bedside chair at end of PT session, NAD. Call bell and phone in reach, chair alarm activated and nursing notified    Deric is a 64 y.o. male admitted on 1/28/2020 with S/P revision of total knee, right. Principal problem is Status post revision of total replacement of right knee.    Past Medical History  Deric has a past medical history of Allergic rhinitis, Attention deficit disorder (ADD) in adult, Carpal tunnel syndrome (5/30/2018), Complaint of nasal congestion, Depression, Diverticulosis (9/17/2018), GERD (gastroesophageal reflux disease), COURT on CPAP, Osteoarthritis of both knees, and Polyp of colon (3/7/2018).    History of Present Illness   64 y.o. male status post right revision total knee arthroplasty w/ intra-operative femur fracture and placement of 2 cerclage wires Dr Harman 1/28, TTWB RLE    PT Vitals    Date/Time Pulse SpO2 Pt Activity O2 Therapy BP BP Method Pt Position [facility]   01/30/20 0740 71 98 % At rest None (Room air) 110/63 Automatic Sitting LA      PT Pain    Date/Time Pain Type Pref Pain Scale Side Location Rating: Rest Rating: Activity Interventions [facility]   01/30/20 0740 Pain Assessment number (Numeric Rating Pain Scale) Right knee 3 3 position adjusted LA          Prior Living Environment      Most Recent Value   Living Arrangements  house   Living Environment Comment  lives w/ spouse in 2 SH           Prior Level of Function      Most Recent Value   Ambulation  independent   Transferring  independent   Toileting  independent   Bathing  independent   Dressing  independent   Eating  independent   Prior Level of Function Comment  PTA : IND w/ adl's /mobility w/o device    Equipment Currently Used at Home  walker, front-wheeled          PT Evaluation and Treatment - 01/30/20 0740        Time Calculation    Start Time  0740     Stop Time  0752     Time Calculation (min)   12 min        Session Details    Document Type  daily treatment/progress note     Mode of Treatment  physical therapy        General Information    Patient Profile Reviewed?  yes     Onset of Illness/Injury or Date of Surgery  01/28/20     Referring Physician  Kimani     General Observations of Patient  Pt received OOB seated in chair     Existing Precautions/Restrictions  fall;weight bearing        Weight-Bearing Status    Right LE Weight-Bearing Status  toe touch weight-bearing (TTWB)        Cognition/Psychosocial    Affect/Mental Status (Cognitive)  WFL     Orientation Status (Cognition)  oriented x 3     Follows Commands (Cognition)  follows multi-step commands        Bed Mobility    Bed Mobility  other (see comments)     Comment (Bed Mobility)  pt received OOB seated in chair        Transfers    Transfers  car transfer     Maintains Weight-Bearing Status (Transfers)  cues to maintain weight-bearing status        Sit to Stand Transfer    [place], Sit to Stand Transfer  1 person assist;minimum assist (75% patient effort);verbal cues     Verbal Cues  safety;proper use of assistive device;maintaining precautions     Assistive Device  walker, front-wheeled     Comment  cues needed to ensure TTWB to RLE, support to steady RW needed         Stand to Sit Transfer    [place], Stand to Sit Transfer  1 person assist;minimum assist (75% patient effort);verbal cues     Verbal Cues  safety;proper use of assistive device     Assistive Device  walker, front-wheeled     Comment  mild support needed to ensure controlled descent into chair         Car Transfer    Transfer Technique  sit-stand;stand-sit     [place], Car Transfer  close supervision;verbal cues     Verbal Cues  safety;proper use of assistive device     Assistive Device  walker, front-wheeled     Comment  pt able to complete transfer with supervision, no assist needed to complete transfer        Gait Training    [place], Gait  close supervision  "    Assistive Device  walker, front-wheeled     Distance in Feet  50 feet     Gait Pattern Utilized  step-to     Deviations/Abnormal Patterns (Gait)  stride length decreased;step length decreased;antalgic     Maintains Weight-Bearing Status  able to maintain     Comment  cues to ensure TTWB to RLE, increased WB through UE as pt fatigues, pt able to slide RLE FWD        Curb Negotiation (Mobility)    [place]  close supervision;verbal cues     Comment  pt completed transfer with close supervision, cues for technique, cues needed to ensure TTWB to RLE, no LOB noted, however, effortful and pt reported feeling \"shaky when performing transfer\"        Stairs Training    [place], Stairs  unable to assess     Comment  Due to pt's current WB restrictions        Balance    Balance Assessment  sitting static balance     Static Sitting Balance  WFL;sitting in chair        AM-PAC (TM) - Mobility (Current Function)    Turning from your back to your side while in a flat bed without using bedrails?  3 - A Little     Moving from lying on your back to sitting on the side of a flat bed without using bedrails?  3 - A Little     Moving to and from a bed to a chair?  3 - A Little     Standing up from a chair using your arms?  3 - A Little     To walk in a hospital room?  3 - A Little     Climbing 3-5 steps with a railing?  2 - A Lot     AM-PAC (TM) Mobility Score  17        Therapy Assessment/Plan (PT)    Rehab Potential (PT)  good, to achieve stated therapy goals     Criteria for Skilled Interventions Met (PT)  yes     Therapy Frequency (PT)  5-7 times/wk        Progress Summary (PT)    Daily Outcome Statement (PT)  1/30 Pt seen for follow up PT session; Patient demonstrates good insight into his overall deficits and limitation; patient currently requires assist for all mobility; he is limited to complete certain mobility tasks due to WB restrictions; due to his limitations, rec SNF at [place]         Therapy Plan Review/Discharge " Plan (PT)    Anticipated Equipment Needs at Discharge (PT Eval)  none     PT Recommended Discharge Disposition  skilled nursing facility        Plan of Care Review    Plan of Care Reviewed With  patient                       Education provided this session. See the Patient Education summary report for full details.    PT Goals      Most Recent Value   Bed Mobility Goal 1   Activity/Assistive Device  bed mobility activities, all at 01/28/2020 1615   Time Frame  short-term goal (STG) at 01/28/2020 1615   Progress/Outcome  goal ongoing at 01/28/2020 1615   Transfer Goal 1   Activity/Assistive Device  sit-to-stand/stand-to-sit, walker, front-wheeled, car transfer at 01/28/2020 1615   [place]  modified independence at 01/28/2020 1615   Time Frame  short-term goal (STG) at 01/28/2020 1615   Progress/Outcome  goal ongoing at 01/28/2020 1615   Gait Training Goal 1   Activity/Assistive Device  gait (walking locomotion), walker, rolling at 01/28/2020 1615   [place]  modified independence at 01/28/2020 1615   Distance  150 at 01/28/2020 1615   Time Frame  short-term goal (STG) at 01/28/2020 1615   Progress/Outcome  goal ongoing at 01/28/2020 1615   Stairs Goal 1   Activity/Assistive Device  stairs, all skills at 01/28/2020 1615   [place]  modified independence at 01/28/2020 1615   Number of Stairs  5 at 01/28/2020 1615   Time Frame  short-term goal (STG) at 01/28/2020 1615   Progress/Outcome  goal ongoing at 01/28/2020 1615

## 2020-01-30 NOTE — PLAN OF CARE
Problem: Adult Inpatient Plan of Care  Goal: Readiness for Transition of Care  Outcome: Met     Problem: Adult Inpatient Plan of Care  Goal: Readiness for Transition of Care  Intervention: Mutually Develop Transition Plan  Flowsheets  Taken 1/30/2020 1038 by Acacia Nice LSW  Anticipated Discharge Disposition: skilled nursing facility  Discharge Coordination/Progress: spouse providing tranport  Concerns Comments: s/p right knee revision w/ intro op fx  Patient/Family Anticipates Transition to: other (see comments) (SNF)  Patient's Choice of Community Agency(s): [facility]  Taken 1/29/2020 1207 by Milly Verduzco, SARAH  Transportation Anticipated: family or friend will provide  Concerns to be Addressed: discharge planning   Pt for d/c today, [facility] does not have a bed, pt in agreement with [facility].  Bed confirmed with kathleen at [facility].  Spouse state she would like to provide transport and if auth is obtained by 1pm she would like to drive pt to [facility].  Kathleen from [facility] is working on auth.  LYNSEY alerted spouse that once auth is obtained SW will call (p)[phone] so she knows she can head to the hospital.   SW waiting for auth.    ADDENDUM 12:14-  Kathleen form [facility] obtained auth, [id number], pt assigned to 2nd floor C wing rm. 5256 RN# (p) [phone].   Plan for d/c at 13:30.  LYNSEY spoke w/ spouse who states she will be down at the [facility] at 13:30, RN aware and will call for transport around 13:15.  RN given # for report, medicare letter signed, pt knows his CPAP bedside is to go with him to [facility].  Kathleen can pull d/c instructions from [employer].

## 2020-01-30 NOTE — PLAN OF CARE
Problem: Adult Inpatient Plan of Care  Goal: Plan of Care Review  Outcome: Progressing  Flowsheets (Taken 1/30/2020 0957)  Progress: improving  Plan of Care Reviewed With: patient  Outcome Summary: PT session completed

## 2020-01-30 NOTE — PLAN OF CARE
Problem: Adult Inpatient Plan of Care  Goal: Plan of Care Review  Outcome: Progressing  Flowsheets (Taken 1/30/2020 0330)  Progress: improving  Plan of Care Reviewed With: patient  Outcome Summary: Pt. OOB assist x 1 with TTWB to RLE. Pain control with current regimen. COURT protocol with CPAP overnight. PT recommending SNF. Pt. hopeful for d/c later today.

## 2020-01-30 NOTE — PATIENT CARE CONFERENCE
Care Progression Rounds Note  Date: 1/30/2020  Time: 10:06 AM     Patient Name: Titus Ray     Medical Record Number: [id number]   YOB: 1955  Sex: Male      Room/Bed: 5426    Admitting Diagnosis: Pain in prosthetic joint, initial encounter (CMS/[facility]) [T84.84XA]  S/P revision of total knee, right [Z96.651]   Admit Date/Time: 1/28/2020  7:23 AM    Primary Diagnosis: Status post revision of total replacement of right knee  Principal Problem: Status post revision of total replacement of right knee    GMLOS: pending  Anticipated Discharge Date: 1/30/2020    AM-PAC  Mobility Score: 17    Discharge Planning:  Living Arrangements: house  Anticipated Discharge Disposition: other (see comments)(To be determined after physical therapy session today..)    Barriers to Discharge:  Barriers to Discharge: Facility availability    Participants:  advanced practice provider, , nursing, physical therapy, social work/services

## 2020-01-31 DIAGNOSIS — D64.9 ANEMIA, UNSPECIFIED TYPE: ICD-10-CM

## 2020-01-31 DIAGNOSIS — J30.9 ALLERGIC RHINITIS, UNSPECIFIED SEASONALITY, UNSPECIFIED TRIGGER: ICD-10-CM

## 2020-01-31 DIAGNOSIS — K57.90 DIVERTICULOSIS: ICD-10-CM

## 2020-01-31 LAB
ALBUMIN SERPL-MCNC: 3.2 G/DL (ref 3.4–5)
ALP SERPL-CCNC: 44 IU/L (ref 35–126)
ALT SERPL-CCNC: 22 IU/L (ref 16–63)
ANION GAP SERPL CALC-SCNC: 10 MEQ/L (ref 3–15)
AST SERPL-CCNC: 22 IU/L (ref 15–41)
BILIRUB SERPL-MCNC: 0.8 MG/DL (ref 0.3–1.2)
BUN SERPL-MCNC: 15 MG/DL (ref 8–20)
CALCIUM SERPL-MCNC: 8.7 MG/DL (ref 8.9–10.3)
CHLORIDE SERPL-SCNC: 102 MEQ/L (ref 98–109)
CO2 SERPL-SCNC: 26 MEQ/L (ref 22–32)
CREAT SERPL-MCNC: 0.9 MG/DL
ERYTHROCYTE [DISTWIDTH] IN BLOOD BY AUTOMATED COUNT: 13.5 % (ref 11.6–14.4)
GFR SERPL CREATININE-BSD FRML MDRD: >60 ML/MIN/1.73M*2
GLUCOSE SERPL-MCNC: 92 MG/DL (ref 70–99)
HCT VFR BLDCO AUTO: 35.9 % (ref 40.1–51)
HGB BLD-MCNC: 11.2 G/DL
MCH RBC QN AUTO: 28.4 PG (ref 28–33.2)
MCHC RBC AUTO-ENTMCNC: 31.2 G/DL (ref 32.2–36.5)
MCV RBC AUTO: 91.1 FL (ref 83–98)
PDW BLD AUTO: 10.8 FL (ref 9.4–12.4)
PLATELET # BLD AUTO: 216 K/UL
POTASSIUM SERPL-SCNC: 4.4 MEQ/L (ref 3.6–5.1)
PROT SERPL-MCNC: 5.5 G/DL (ref 6–8.2)
RBC # BLD AUTO: 3.94 M/UL (ref 4.5–5.8)
SODIUM SERPL-SCNC: 138 MEQ/L (ref 136–144)
WBC # BLD AUTO: 6.87 K/UL

## 2020-01-31 PROCEDURE — 36415 COLL VENOUS BLD VENIPUNCTURE: CPT

## 2020-01-31 PROCEDURE — 85027 COMPLETE CBC AUTOMATED: CPT | Performed by: INTERNAL MEDICINE

## 2020-01-31 PROCEDURE — 80053 COMPREHEN METABOLIC PANEL: CPT | Performed by: INTERNAL MEDICINE

## 2020-02-07 ENCOUNTER — TELEPHONE (OUTPATIENT)
Dept: PRIMARY CARE | Facility: CLINIC | Age: 65
End: 2020-02-07

## 2020-02-07 DIAGNOSIS — M25.561 ACUTE PAIN OF RIGHT KNEE: ICD-10-CM

## 2020-02-07 LAB
ANION GAP SERPL CALC-SCNC: 8 MEQ/L (ref 3–15)
BUN SERPL-MCNC: 11 MG/DL (ref 8–20)
CALCIUM SERPL-MCNC: 9.4 MG/DL (ref 8.9–10.3)
CHLORIDE SERPL-SCNC: 103 MEQ/L (ref 98–109)
CO2 SERPL-SCNC: 27 MEQ/L (ref 22–32)
CREAT SERPL-MCNC: 0.9 MG/DL (ref 0.8–1.3)
ERYTHROCYTE [DISTWIDTH] IN BLOOD BY AUTOMATED COUNT: 12.7 % (ref 11.6–14.4)
GFR SERPL CREATININE-BSD FRML MDRD: >60 ML/MIN/1.73M*2
GLUCOSE SERPL-MCNC: 99 MG/DL (ref 70–99)
HCT VFR BLDCO AUTO: 38.8 % (ref 40.1–51)
HGB BLD-MCNC: 12.7 G/DL (ref 13.7–17.5)
MCH RBC QN AUTO: 28.7 PG (ref 28–33.2)
MCHC RBC AUTO-ENTMCNC: 32.7 G/DL (ref 32.2–36.5)
MCV RBC AUTO: 87.6 FL (ref 83–98)
PDW BLD AUTO: 10.1 FL (ref 9.4–12.4)
PLATELET # BLD AUTO: 362 K/UL (ref 150–350)
POTASSIUM SERPL-SCNC: 4.4 MEQ/L (ref 3.6–5.1)
RBC # BLD AUTO: 4.43 M/UL (ref 4.5–5.8)
SODIUM SERPL-SCNC: 138 MEQ/L (ref 136–144)
WBC # BLD AUTO: 5.99 K/UL (ref 3.8–10.5)

## 2020-02-07 PROCEDURE — 36415 COLL VENOUS BLD VENIPUNCTURE: CPT | Performed by: INTERNAL MEDICINE

## 2020-02-07 PROCEDURE — 85027 COMPLETE CBC AUTOMATED: CPT | Performed by: INTERNAL MEDICINE

## 2020-02-07 PROCEDURE — 80048 BASIC METABOLIC PNL TOTAL CA: CPT | Performed by: INTERNAL MEDICINE

## 2020-02-07 NOTE — TELEPHONE ENCOUNTER
· I called the patient and left a message on his cell that the form will be mailed and if he doesn't want it mailed kindly call me back before the end of business today .   · I asked Kaya to place it in the mail bin today.

## 2020-02-07 NOTE — TELEPHONE ENCOUNTER
----- Message from Titus Richard Ray sent at 2/7/2020 11:02 AM EST -----  Regarding: Non-Urgent Medical Question  Contact: [phone]  I am heading home from [facility] tomorrow (Saturday) following right knee revision by Dr. Harman and when I am able to resume driving, I would like to have a termporary [place] handicap placard to mimize walking distances until i'm fully recovered.  i have attached the partially completed [employer] application form.  Is this something that can be compelted and submitted by your office?  If you can not submit, please e-mailthe completed form back to me for submission.      Many thanks!  · Dr Douglas I printed out the form and placed it in your folder Thanks

## 2020-02-10 ENCOUNTER — TELEPHONE (OUTPATIENT)
Dept: PRIMARY CARE | Facility: CLINIC | Age: 65
End: 2020-02-10

## 2020-02-10 NOTE — TELEPHONE ENCOUNTER
· I called [facility] home care I spoke with Iqra I told her that dr lyle will sign the orders when he receives them .

## 2020-02-10 NOTE — TELEPHONE ENCOUNTER
· Iqra from [facility] calling she started patient on home care today he had a right knee revision and wanted to know if you would sign orders she can be reached at [phone].

## 2020-02-11 LAB
GRAM STN SPEC: NORMAL
MICROORGANISM SPEC CULT: NORMAL

## 2020-02-21 RX ORDER — CITALOPRAM 20 MG/1
20 TABLET, FILM COATED ORAL
Qty: 90 TABLET | Refills: 3 | Status: SHIPPED | OUTPATIENT
Start: 2020-02-21 | End: 2021-06-07 | Stop reason: SDUPTHER

## 2020-02-25 LAB
FUNGUS SPEC CULT: NORMAL

## 2020-03-11 LAB
MYCOBACTERIUM SPEC CULT: NORMAL

## 2020-03-16 ENCOUNTER — TELEPHONE (OUTPATIENT)
Dept: PRIMARY CARE | Facility: CLINIC | Age: 65
End: 2020-03-16

## 2020-03-16 NOTE — TELEPHONE ENCOUNTER
"----- Message from Titus Richard Lucandrea sent at 3/16/2020 10:38 AM EDT -----  Regarding: Referral Request  Contact: [phone]  Hello!  I'm finally past my \"toe touch\" weight bearing restriction following right knee revision on Jan28 and have been released from home nurising and rehab care.  I have a script from Dr. Harman dated 3/11 to begin out-patient rehab.      [facility] is my capitated rehab under [facility].  I have scheduled my first visit on Wednesday, 3/18 at their [place] facility, but the advise that I will also need a referral from my primary physician.    Their NPI number is [id number]    [address],   [place] [address]  [phone]    Many thanks! - Deric  "

## 2020-03-22 NOTE — DISCHARGE SUMMARY
Inpatient Discharge Summary    Pt is a 64 y.o. male with a history of R TKA who presented with symptoms recalcitrant to non-surgical management. Dr. Harman recommended R revision total Knee arthroplasty. The patient underwent the procedure on 1/28/2020. Patient tolerated the procedure well. The patient's post-operative course was uncomplicated. The patient was seen by the orthopaedic and hospital medicine service and progressed to the point that on the day of discharge they were voiding without difficulty, tolerating a house diet, and was comfortable with all post-operative activity restrictions. At discharge the patient's vital signs were stable and the surgical incision site was clean, dry and intact. The patient was discharged on 1/30/2020 and instructed to take Aspirin  for DVT prophylaxis, continue pain medications and bowel regimen as needed, resume home medications except for those marked as held on discharge instructions, and to follow up with their surgeon. The patient was advised to call the office with any problems, including drainage from the incision, redness around the incision, worsening pain and fever greater than 101 degrees F.     Peter Ovalle MD

## 2020-07-24 ENCOUNTER — TELEPHONE (OUTPATIENT)
Dept: PRIMARY CARE | Facility: CLINIC | Age: 65
End: 2020-07-24

## 2020-07-24 DIAGNOSIS — Z00.00 ENCOUNTER FOR GENERAL ADULT MEDICAL EXAMINATION WITHOUT ABNORMAL FINDINGS: Primary | ICD-10-CM

## 2020-07-24 NOTE — TELEPHONE ENCOUNTER
This patient has an upcoming appointment with your office and would like to have their lab work completed prior to the visit.         Patient Preferred Laboratory: [facility]    Patient Primary Insurance in Epic: [employer] [facility]          or [place] Mail?:Electronic

## 2020-07-24 NOTE — TELEPHONE ENCOUNTER
· I did the order I called the patient I left a detailed message that the lab order was sent electronically and that I would mail it to him since the appointment isn't until October .

## 2020-10-09 ENCOUNTER — OFFICE VISIT (OUTPATIENT)
Dept: PRIMARY CARE | Facility: CLINIC | Age: 65
End: 2020-10-09
Payer: COMMERCIAL

## 2020-10-09 VITALS
HEART RATE: 63 BPM | RESPIRATION RATE: 16 BRPM | BODY MASS INDEX: 38.71 KG/M2 | WEIGHT: 270.4 LBS | OXYGEN SATURATION: 97 % | HEIGHT: 70 IN | TEMPERATURE: 97.8 F | SYSTOLIC BLOOD PRESSURE: 120 MMHG | DIASTOLIC BLOOD PRESSURE: 82 MMHG

## 2020-10-09 DIAGNOSIS — Z00.00 ENCOUNTER FOR GENERAL ADULT MEDICAL EXAMINATION WITHOUT ABNORMAL FINDINGS: Primary | ICD-10-CM

## 2020-10-09 PROBLEM — Z96.651 S/P REVISION OF TOTAL KNEE, RIGHT: Status: RESOLVED | Noted: 2020-01-28 | Resolved: 2020-10-09

## 2020-10-09 PROBLEM — M25.561 ACUTE PAIN OF RIGHT KNEE: Status: RESOLVED | Noted: 2019-10-16 | Resolved: 2020-10-09

## 2020-10-09 PROBLEM — Z96.651 STATUS POST REVISION OF TOTAL REPLACEMENT OF RIGHT KNEE: Status: RESOLVED | Noted: 2020-01-28 | Resolved: 2020-10-09

## 2020-10-09 PROBLEM — Z01.818 PREOP EXAMINATION: Status: RESOLVED | Noted: 2020-01-16 | Resolved: 2020-10-09

## 2020-10-09 PROCEDURE — 90471 IMMUNIZATION ADMIN: CPT | Performed by: FAMILY MEDICINE

## 2020-10-09 PROCEDURE — 90472 IMMUNIZATION ADMIN EACH ADD: CPT | Performed by: FAMILY MEDICINE

## 2020-10-09 PROCEDURE — 90694 VACC AIIV4 NO PRSRV 0.5ML IM: CPT | Performed by: FAMILY MEDICINE

## 2020-10-09 PROCEDURE — 99397 PER PM REEVAL EST PAT 65+ YR: CPT | Mod: 25 | Performed by: FAMILY MEDICINE

## 2020-10-09 PROCEDURE — 90732 PPSV23 VACC 2 YRS+ SUBQ/IM: CPT | Performed by: FAMILY MEDICINE

## 2020-10-09 SDOH — HEALTH STABILITY: PHYSICAL HEALTH: ON AVERAGE, HOW MANY DAYS PER WEEK DO YOU ENGAGE IN MODERATE TO STRENUOUS EXERCISE (LIKE A BRISK WALK)?: 4 DAYS

## 2020-10-09 SDOH — HEALTH STABILITY: PHYSICAL HEALTH: ON AVERAGE, HOW MANY MINUTES DO YOU ENGAGE IN EXERCISE AT THIS LEVEL?: 60 MIN

## 2020-10-09 ASSESSMENT — ENCOUNTER SYMPTOMS
EYE PAIN: 0
SINUS PRESSURE: 0
NUMBNESS: 0
HEMATURIA: 0
SORE THROAT: 0
NAUSEA: 0
ABDOMINAL PAIN: 0
DIZZINESS: 0
DYSURIA: 0
COUGH: 0
VOMITING: 0
CHEST TIGHTNESS: 0
SHORTNESS OF BREATH: 0
TROUBLE SWALLOWING: 0
CHILLS: 0
FEVER: 0
DIARRHEA: 0
SPEECH DIFFICULTY: 0
WHEEZING: 0
EYE REDNESS: 0
NECK STIFFNESS: 0
CONSTIPATION: 0
HEADACHES: 0
SLEEP DISTURBANCE: 0
FATIGUE: 0
CONFUSION: 0
NERVOUS/ANXIOUS: 0
NECK PAIN: 0
UNEXPECTED WEIGHT CHANGE: 0
DYSPHORIC MOOD: 0
MYALGIAS: 0
WEAKNESS: 0
FREQUENCY: 0
APPETITE CHANGE: 0
BRUISES/BLEEDS EASILY: 0
BACK PAIN: 0
SINUS PAIN: 0
LIGHT-HEADEDNESS: 0
BLOOD IN STOOL: 0
RHINORRHEA: 0
FLANK PAIN: 0
EYE DISCHARGE: 0
DIFFICULTY URINATING: 0
ARTHRALGIAS: 0

## 2020-10-09 NOTE — PROGRESS NOTES
Tom Douglas MD    [facility]  [address], [address]  [place] [address]  Phone: [phone]  Fax: [phone]     History of Present Illness     Subjective     Patient ID: Titus Ray is a 64 y.o. male.    The patient present for an annual preventative visit.         Past Medical/Surgical/Family/Social History       The following have been reviewed and updated as appropriate in this visit:  Allergies  Meds  Problems         Past Medical History:   Diagnosis Date   • Allergic rhinitis     Controlled with zyrtec and Fluticasone nasal spray as needed. Worse in spring and fall.   • Attention deficit disorder (ADD) in adult     Managed on Straterra in the past but not effective.   • Carpal tunnel syndrome 5/30/2018    Neurologist: Dr. Truong. EMG in 05/2018 with bilateral median neuropathy with right severe and left mild to moderate. Being treated with wrist splints.   • Complaint of nasal congestion     Dr Douglas prescribed Augmentin last day 1/16/20   • Depression     Controlled with citalopram.   • Diverticulosis 9/17/2018    Seen in the sigmoid colon during colonoscopy in 09/2018 and no history of diverticuliitis.    • GERD (gastroesophageal reflux disease)     Managed on Omeprazole. Should take medication about 30 minutes prior to meal.  The patient should avoid caffeinated products. The patient should avoid carbonated beverages. The patient was instructed not to eat within 3 hours of bedtime. Pt instructed to avoid fatty meals. Suggested weight reduction.   • COURT on CPAP     Pulmonologist: Dr. Pulido. Managed with CPAP at 10 CM H2O   • Osteoarthritis of both knees     Orthopedic surgeon: Dr. Cheung. S/P left knee hemiarthroplasty in 2007. X-rays in 12/2014 reveal bilateral small effusions, advanced DJD left knee and mild right knee DJD. MRI of right knee (3/2015) with severe medial compartment osteoarthrosis with subchondral fracture of medial  femoral condyle with associated osteochondral fracture posteriorly. Has medial femoral condyle avascular necrosis.   • Polyp of colon 3/7/2018    Gastroenterologist: Dr. Dunaway. S/P colonoscopy with polypectomy in 2004. Colonoscopy with polypectomy x 7 (5 tubular adenomas and 2 hyperplastic polyps) on 11/5/2014. Colonoscopy in 09/2018 with polypectomy x 5 (Tubular adenoma, sessile serrated adenoma and hyperplastic polyp). Next due in 09/2023.        Past Surgical History:   Procedure Laterality Date   • BONE CYST EXCISION Left     Left humeral bone cyst excision   • COLONOSCOPY W/ POLYPECTOMY     • JOINT REPLACEMENT      RTKR 2014, partial left knee 2008   • REVISION TOTAL KNEE ARTHROPLASTY Right 01/2020    Dr. Harman   • VARICOCELECTOMY  1985   • VASECTOMY         Family History   Problem Relation Age of Onset   • Arthritis Biological Mother    • Valvular heart disease Biological Mother    • COPD Biological Father    • Arthritis Biological Sister    • Arthritis Biological Brother    • Schizophrenia Biological Brother    • No Known Problems Biological Daughter    • No Known Problems Biological Daughter    • No Known Problems Biological Sister    • No Known Problems Biological Son        Social History     Tobacco Use   • Smoking status: Never Smoker   • Smokeless tobacco: Never Used   Substance Use Topics   • Alcohol use: Yes     Alcohol/week: 10.0 - 14.0 standard drinks     Types: 10 - 14 Shots of liquor per week     Frequency: 4 or more times a week     Drinks per session: 1 or 2     Binge frequency: Never   • Drug use: No       Patient Care Team:  Tom Douglas MD as PCP - General       Allergies and Medications       Allergies   Allergen Reactions   • Cashew Nut    • Hydromorphone      Other reaction(s): Urticaria   • Golden Flavor      Other reaction(s):  shock, vasovagal syncope  mangos    • Pistachio Nut    • Sunflower Seed Angioedema         Current Outpatient Medications   Medication Sig Dispense Refill    • amoxicillin (AMOXIL) 500 mg tablet Take 4 tablets (2,000 mg total) by mouth once as needed (one hour prior to dental procedure.). 4 tablet 5   • aspirin 81 mg chewable tablet Take 1 tablet (81 mg total) by mouth 2 (two) times a day. For 4 weeks to prevent blood clots. 60 tablet 0   • B-complex with vitamin C tablet Take 1 tablet by mouth daily.      • cetirizine 10 mg tablet Take 10 mg by mouth daily.     • cholecalciferol, vitamin D3, (VITAMIN D3) 2,000 unit tablet Take 2,000 IU by mouth daily.     • citalopram (celeXA) 10 mg tablet Take 1 tablet (10 mg total) by mouth daily. (Patient taking differently: Take 30 mg by mouth daily.  ) 90 tablet 3   • citalopram (celeXA) 20 mg tablet Take 1 tablet (20 mg total) by mouth once daily. 90 tablet 3   • EPINEPHrine (EPIPEN) 0.3 mg/0.3 mL injection syringe Inject 0.3 mL (0.3 mg total) into the thigh as needed for anaphylaxis. 1 Syringe 0   • fluticasone (FLONASE) 50 mcg/actuation nasal spray Administer 2 sprays into each nostril daily as needed.       • MULTIVITAMIN ORAL Take 1 tablet by mouth daily.       • omeprazole (PriLOSEC) 20 mg capsule Take 20 mg by mouth every other day. Take 30 minutes prior to a meal.        No current facility-administered medications for this visit.           Review of Systems       Review of Systems   Constitutional: Negative for appetite change, chills, fatigue, fever and unexpected weight change.   HENT: Negative for congestion, ear pain, hearing loss, nosebleeds, postnasal drip, rhinorrhea, sinus pressure, sinus pain, sneezing, sore throat, tinnitus and trouble swallowing.    Eyes: Negative for pain, discharge, redness and visual disturbance.   Respiratory: Negative for cough, chest tightness, shortness of breath and wheezing.    Cardiovascular: Negative for chest pain and leg swelling.   Gastrointestinal: Negative for abdominal pain, blood in stool, constipation, diarrhea, nausea and vomiting.   Endocrine: Negative for cold intolerance  "and heat intolerance.   Genitourinary: Negative for decreased urine volume, difficulty urinating, dysuria, flank pain, frequency, hematuria and urgency.   Musculoskeletal: Negative for arthralgias, back pain, gait problem, myalgias, neck pain and neck stiffness.   Skin: Negative for rash.   Allergic/Immunologic: Negative for environmental allergies.   Neurological: Negative for dizziness, syncope, speech difficulty, weakness, light-headedness, numbness and headaches.   Hematological: Does not bruise/bleed easily.   Psychiatric/Behavioral: Negative for behavioral problems, confusion, dysphoric mood and sleep disturbance. The patient is not nervous/anxious.         Physical Examination       Objective     Vitals:    10/09/20 1509   BP: 120/82   Pulse: 63   Resp: 16   Temp: 36.6 °C (97.8 °F)   SpO2: 97%       Pulse Readings from Last 5 Encounters:   10/09/20 63   01/30/20 68   01/16/20 78   10/16/19 68   04/26/19 74       Wt Readings from Last 5 Encounters:   10/09/20 123 kg (270 lb 6.4 oz)   01/28/20 116 kg (256 lb)   01/16/20 116 kg (256 lb 9.6 oz)   10/16/19 117 kg (257 lb 3.2 oz)   04/26/19 114 kg (250 lb 12.8 oz)       Ht Readings from Last 5 Encounters:   10/09/20 1.765 m (5' 9.5\")   01/28/20 1.778 m (5' 10\")   01/16/20 1.778 m (5' 10\")   10/16/19 1.778 m (5' 10\")   04/26/19 1.778 m (5' 10\")       BP Readings from Last 5 Encounters:   10/09/20 120/82   01/30/20 132/72   01/16/20 96/60   10/16/19 122/80   04/26/19 118/74       BMI Readings from Last 4 Encounters:   10/09/20 39.36 kg/m²   01/28/20 36.73 kg/m²   01/16/20 36.82 kg/m²   10/16/19 36.90 kg/m²       Physical Exam  Constitutional:       Appearance: Normal appearance. He is well-developed.   HENT:      Head: Normocephalic and atraumatic.      Right Ear: Tympanic membrane, ear canal and external ear normal.      Left Ear: Tympanic membrane, ear canal and external ear normal.      Mouth/Throat:      Pharynx: No oropharyngeal exudate or posterior " oropharyngeal erythema.   Eyes:      General: Lids are normal.      Conjunctiva/sclera: Conjunctivae normal.      Pupils: Pupils are equal, round, and reactive to light.   Neck:      Musculoskeletal: Full passive range of motion without pain, normal range of motion and neck supple.      Thyroid: No thyromegaly.      Vascular: No carotid bruit.   Cardiovascular:      Rate and Rhythm: Normal rate and regular rhythm.      Pulses:           Popliteal pulses are 2+ on the right side and 2+ on the left side.        Dorsalis pedis pulses are 2+ on the right side and 2+ on the left side.      Heart sounds: Normal heart sounds. No murmur. No gallop.    Pulmonary:      Effort: Pulmonary effort is normal. No tachypnea, accessory muscle usage or respiratory distress.      Breath sounds: Normal breath sounds. No decreased breath sounds, wheezing, rhonchi or rales.   Abdominal:      General: Bowel sounds are normal. There is no distension.      Palpations: Abdomen is soft.      Tenderness: There is no abdominal tenderness. There is no guarding or rebound.      Hernia: No hernia is present.   Lymphadenopathy:      Cervical: No cervical adenopathy.   Skin:     General: Skin is warm and dry.      Findings: No rash.   Neurological:      Mental Status: He is alert.   Psychiatric:         Mood and Affect: Mood is not anxious or depressed.         Speech: Speech normal.         Behavior: Behavior normal. Behavior is cooperative.          Laboratory Results     Lab Results   Component Value Date    WBC 5.99 02/07/2020    WBC 6.87 01/31/2020    WBC 10.13 01/29/2020    HGB 12.7 (L) 02/07/2020    HGB 11.2 (L) 01/31/2020    HGB 11.8 (L) 01/29/2020    HCT 38.8 (L) 02/07/2020    HCT 35.9 (L) 01/31/2020    HCT 35.3 (L) 01/29/2020    MCV 87.6 02/07/2020    MCV 91.1 01/31/2020    MCV 86.9 01/29/2020     (H) 02/07/2020     01/31/2020     01/29/2020        Lab Results   Component Value Date    GLUCOSE 99 02/07/2020    GLUCOSE  92 01/31/2020    GLUCOSE 124 (H) 01/29/2020    CALCIUM 9.4 02/07/2020    CALCIUM 8.7 (L) 01/31/2020    CALCIUM 8.2 (L) 01/29/2020     02/07/2020     01/31/2020     01/29/2020    K 4.4 02/07/2020    K 4.4 01/31/2020    K 4.3 01/29/2020    CO2 27 02/07/2020    CO2 26 01/31/2020    CO2 22 01/29/2020     02/07/2020     01/31/2020     01/29/2020    BUN 11 02/07/2020    BUN 15 01/31/2020    BUN 23 (H) 01/29/2020    CREATININE 0.9 02/07/2020    CREATININE 0.9 01/31/2020    CREATININE 1.0 01/29/2020    ALKPHOS 44 01/31/2020    ALKPHOS 63 03/06/2018    ALKPHOS 72 04/05/2016    AST 22 01/31/2020    AST 23 03/06/2018    AST 16 04/05/2016    ALT 22 01/31/2020    ALT 22 03/06/2018    ALT 17 04/05/2016    BILITOT 0.8 01/31/2020    BILITOT 0.5 03/06/2018    BILITOT 0.5 04/05/2016    ALBUMIN 3.2 (L) 01/31/2020    ALBUMIN 4.1 03/06/2018    ALBUMIN 4.2 04/05/2016       Lab Results   Component Value Date    CHOL 198 03/06/2018    CHOL 179 04/05/2016     Lab Results   Component Value Date    HDL 62 03/06/2018    HDL 53 04/05/2016     Lab Results   Component Value Date    LDLCALC 115 (H) 03/06/2018    LDLCALC 96 04/05/2016     Lab Results   Component Value Date    TRIG 103 03/06/2018    TRIG 152 (H) 04/05/2016       The 10-year ASCVD risk score (Yung BEGUM Jr., et al., 2013) is: 10.9%    Values used to calculate the score:      Age: 64 years      Sex: Male      Is Non- : No      Diabetic: No      Tobacco smoker: No      Systolic Blood Pressure: 120 mmHg      Is BP treated: No      HDL Cholesterol: 62 mg/dL      Total Cholesterol: 198 mg/dL    Lab Results   Component Value Date    TSH 1.760 03/06/2018    TSH 1.620 04/05/2016       Lab Results   Component Value Date    HGBA1C 5.4 01/05/2016         Lab Results   Component Value Date    PSA 0.8 03/06/2018    PSA 0.7 04/05/2016       Lab Results   Component Value Date    HEPCAB <0.1 03/06/2018     Immunization History   Administered  Date(s) Administered   • Influenza Split Preservative Free ID 12/19/2012   • Influenza TIV (IM) 11/03/2010   • Influenza Vaccine 65 And Older Preservative Free 10/16/2019   • Influenza Vaccine Quadrivalent 3 Yr And Older 11/11/2015, 09/25/2018   • Influenza Vaccine Quadrivalent Preservative Free 6-35 Months 09/27/2013, 11/21/2014, 10/10/2016   • Influenza, Unspecified 11/21/2014, 10/10/2016, 11/06/2017   • Pneumococcal Conjugate 13-Valent 11/30/2018   • Tdap 03/03/2011   • Zoster 11/21/2014         Health Maintenance Topics with due status: Overdue       Topic Date Due    Zoster Vaccine 01/16/2015    Pneumococcal (65 years and older) 11/30/2019    Annual Falls Risk Screening 01/01/2020    Influenza Vaccine 08/01/2020     Health Maintenance Topics with due status: Not Due       Topic Last Completion Date    DTaP, Tdap, and Td Vaccines 03/03/2011    Colonoscopy 09/14/2018     Health Maintenance Topics with due status: Completed       Topic Last Completion Date    Hepatitis C Screening 03/06/2018     Health Maintenance Topics with due status: Aged Out       Topic Date Due    Meningococcal ACWY Aged Out    HIB Vaccines Aged Out    IPV Vaccines Aged Out    HPV Vaccines Aged Out    Pneumococcal Aged Out     Health Maintenance Topics with due status: Discontinued       Topic Date Due    Varicella Vaccines Discontinued        Assessment and Plan       Assessment/Plan     Problem List Items Addressed This Visit     Encounter for general adult medical examination without abnormal findings - Primary    Current Assessment & Plan     · The patient is currently stable.   · Bloodwork was ordered including a complete blood count, complete metabolic profile, thyroid stimulating hormone, PSA, and a lipid profile.   · Further recommendations will be provided to the patient based on the results of the tests.   · The patient should continue to exercise for 2.5 hours per week.   · All the recommend vaccinations are not up to date. FLU  shot and Pneumovax given today.   · The patient should be evaluated by the ophthalmologist every 1-2 years and dentist every 6 months.   · The patient was advised to protect the skin by applying suntan lotion containing an SPF > 30 every 2 hours while in sun or after swimming. Instructed to avoid prolonged direct sun exposure as much as possible.   · The patient's body mass index is elevated. The patient was instructed to lose weight through diet and exercise to achieve a body mass index < 25.   · The patient's colonoscopy is up to date.   · Advised to consume 1000 mg of calcium daily through the diet. If the patient is unable to consume 1000 mg through the diet, then taking calcium supplements is an acceptable alternative. The patient was informed not to consume more than 500 mg of a calcium supplement at a time.                    Return in about 1 year (around 10/9/2021) for Physical Exam.    Orders Placed This Encounter   Procedures   • Influenza vaccine 65 and older IM preservative free   • Pneumococcal polysaccharide vaccine 23-valent greater than or equal to 1yo subcutaneous/IM              Tom Douglas MD    10/9/2020

## 2020-10-09 NOTE — PATIENT INSTRUCTIONS
Problem List Items Addressed This Visit     Encounter for general adult medical examination without abnormal findings - Primary     · The patient is currently stable.   · Bloodwork was ordered including a complete blood count, complete metabolic profile, thyroid stimulating hormone, PSA, and a lipid profile.   · Further recommendations will be provided to the patient based on the results of the tests.   · The patient should continue to exercise for 2.5 hours per week.   · All the recommend vaccinations are not up to date. FLU shot and Pneumovax given today.   · The patient should be evaluated by the ophthalmologist every 1-2 years and dentist every 6 months.   · The patient was advised to protect the skin by applying suntan lotion containing an SPF > 30 every 2 hours while in sun or after swimming. Instructed to avoid prolonged direct sun exposure as much as possible.   · The patient's body mass index is elevated. The patient was instructed to lose weight through diet and exercise to achieve a body mass index < 25.   · The patient's colonoscopy is up to date.   · Advised to consume 1000 mg of calcium daily through the diet. If the patient is unable to consume 1000 mg through the diet, then taking calcium supplements is an acceptable alternative. The patient was informed not to consume more than 500 mg of a calcium supplement at a time.

## 2020-10-09 NOTE — ASSESSMENT & PLAN NOTE
· The patient is currently stable.   · Bloodwork was ordered including a complete blood count, complete metabolic profile, thyroid stimulating hormone, PSA, and a lipid profile.   · Further recommendations will be provided to the patient based on the results of the tests.   · The patient should continue to exercise for 2.5 hours per week.   · All the recommend vaccinations are not up to date. FLU shot and Pneumovax given today.   · The patient should be evaluated by the ophthalmologist every 1-2 years and dentist every 6 months.   · The patient was advised to protect the skin by applying suntan lotion containing an SPF > 30 every 2 hours while in sun or after swimming. Instructed to avoid prolonged direct sun exposure as much as possible.   · The patient's body mass index is elevated. The patient was instructed to lose weight through diet and exercise to achieve a body mass index < 25.   · The patient's colonoscopy is up to date.   · Advised to consume 1000 mg of calcium daily through the diet. If the patient is unable to consume 1000 mg through the diet, then taking calcium supplements is an acceptable alternative. The patient was informed not to consume more than 500 mg of a calcium supplement at a time.

## 2020-10-20 ENCOUNTER — TELEPHONE (OUTPATIENT)
Dept: PRIMARY CARE | Facility: CLINIC | Age: 65
End: 2020-10-20

## 2020-10-20 NOTE — TELEPHONE ENCOUNTER
Shagufta, Patient gave us the incorrect NPI for this referral. It comes up as [facility].  I called the patient and he is going to call the office that he is going to to get the correct one and will send another message.

## 2020-10-20 NOTE — TELEPHONE ENCOUNTER
----- Message from Kell Buckley MA sent at 10/20/2020 10:14 AM EDT -----  Regarding: FW: Referral Request  Contact: [phone]    ----- Message -----  From: Titus Ray  Sent: 10/20/2020  10:10 AM EDT  To: [facility] Clinical Support [facility]  Subject: Referral Request                                 I would like to request a referral for my twice-yearly optical exam due to risk factors for glaucoma (family history and large optic nerve).  I have an appointment on November 2 at [facility] on [facility] in [place] ([email or web address]).  Their NPI # is [id number].    Many thanks!    PS - I just had my blood lab work taken this morning at [facility] so those results should be there in the next day or two.

## 2020-10-21 ENCOUNTER — DOCUMENTATION (OUTPATIENT)
Dept: PRIMARY CARE | Facility: CLINIC | Age: 65
End: 2020-10-21

## 2020-10-21 PROBLEM — R73.01 IFG (IMPAIRED FASTING GLUCOSE): Chronic | Status: ACTIVE | Noted: 2020-10-21

## 2020-10-21 PROBLEM — E78.49 OTHER HYPERLIPIDEMIA: Chronic | Status: ACTIVE | Noted: 2020-10-21

## 2020-10-21 LAB
ALBUMIN SERPL-MCNC: 4.2 G/DL (ref 3.8–4.8)
ALBUMIN/GLOB SERPL: 1.6 {RATIO} (ref 1.2–2.2)
ALP SERPL-CCNC: 78 IU/L (ref 39–117)
ALT SERPL-CCNC: 18 IU/L (ref 0–44)
AST SERPL-CCNC: 17 IU/L (ref 0–40)
BASOPHILS # BLD AUTO: 0.1 X10E3/UL (ref 0–0.2)
BASOPHILS NFR BLD AUTO: 1 %
BILIRUB SERPL-MCNC: 0.3 MG/DL (ref 0–1.2)
BUN SERPL-MCNC: 17 MG/DL (ref 8–27)
BUN/CREAT SERPL: 19 (ref 10–24)
CALCIUM SERPL-MCNC: 9.2 MG/DL (ref 8.6–10.2)
CHLORIDE SERPL-SCNC: 106 MMOL/L (ref 96–106)
CHOLEST SERPL-MCNC: 203 MG/DL (ref 100–199)
CO2 SERPL-SCNC: 22 MMOL/L (ref 20–29)
CREAT SERPL-MCNC: 0.88 MG/DL (ref 0.76–1.27)
EOSINOPHIL # BLD AUTO: 0.2 X10E3/UL (ref 0–0.4)
EOSINOPHIL NFR BLD AUTO: 4 %
ERYTHROCYTE [DISTWIDTH] IN BLOOD BY AUTOMATED COUNT: 12.8 % (ref 11.6–15.4)
GLOBULIN SER CALC-MCNC: 2.6 G/DL (ref 1.5–4.5)
GLUCOSE SERPL-MCNC: 102 MG/DL (ref 65–99)
HCT VFR BLD AUTO: 44.2 % (ref 37.5–51)
HDLC SERPL-MCNC: 74 MG/DL
HGB BLD-MCNC: 14.4 G/DL (ref 13–17.7)
IMM GRANULOCYTES # BLD AUTO: 0 X10E3/UL (ref 0–0.1)
IMM GRANULOCYTES NFR BLD AUTO: 0 %
LAB CORP EGFR IF AFRICN AM: 103 ML/MIN/1.73
LAB CORP EGFR IF NONAFRICN AM: 89 ML/MIN/1.73
LDLC SERPL CALC-MCNC: 119 MG/DL (ref 0–99)
LYMPHOCYTES # BLD AUTO: 1.6 X10E3/UL (ref 0.7–3.1)
LYMPHOCYTES NFR BLD AUTO: 33 %
MCH RBC QN AUTO: 28.9 PG (ref 26.6–33)
MCHC RBC AUTO-ENTMCNC: 32.6 G/DL (ref 31.5–35.7)
MCV RBC AUTO: 89 FL (ref 79–97)
MONOCYTES # BLD AUTO: 0.5 X10E3/UL (ref 0.1–0.9)
MONOCYTES NFR BLD AUTO: 10 %
NEUTROPHILS # BLD AUTO: 2.4 X10E3/UL (ref 1.4–7)
NEUTROPHILS NFR BLD AUTO: 52 %
PLATELET # BLD AUTO: 277 X10E3/UL (ref 150–450)
POTASSIUM SERPL-SCNC: 4.9 MMOL/L (ref 3.5–5.2)
PROT SERPL-MCNC: 6.8 G/DL (ref 6–8.5)
PSA SERPL-MCNC: 0.7 NG/ML (ref 0–4)
RBC # BLD AUTO: 4.99 X10E6/UL (ref 4.14–5.8)
SODIUM SERPL-SCNC: 140 MMOL/L (ref 134–144)
TRIGL SERPL-MCNC: 52 MG/DL (ref 0–149)
TSH SERPL DL<=0.005 MIU/L-ACNC: 0.88 UIU/ML (ref 0.45–4.5)
VLDLC SERPL CALC-MCNC: 10 MG/DL (ref 5–40)
WBC # BLD AUTO: 4.7 X10E3/UL (ref 3.4–10.8)

## 2020-10-21 NOTE — PROGRESS NOTES
Patient notified of blood test results.  Has elevated glucose.  Patient advised to follow a low carbohydrate diet and low concentrated sweet diet.  Advised to exercise 2.5 hours per week.  Advised to reduce body mass index to less than 25.  Requires periodic monitoring of A1C.  Has mild elevation of lipids.  Patient was advised to follow a low-fat diet.  Advised to reduce body mass index to less than 25.  Advised to exercise for at least 2.5 hours per week.  Should have lipid profile rechecked in 6-12 months.  All other blood tests are stable no other new recommendations advised at this time.

## 2020-10-27 ENCOUNTER — TRANSCRIBE ORDERS (OUTPATIENT)
Dept: RADIOLOGY | Facility: CLINIC | Age: 65
End: 2020-10-27

## 2020-10-27 ENCOUNTER — HOSPITAL ENCOUNTER (OUTPATIENT)
Dept: RADIOLOGY | Facility: CLINIC | Age: 65
Discharge: HOME | End: 2020-10-27
Attending: FAMILY MEDICINE
Payer: COMMERCIAL

## 2020-10-27 DIAGNOSIS — M75.42 IMPINGEMENT SYNDROME OF LEFT SHOULDER: ICD-10-CM

## 2020-10-27 DIAGNOSIS — M75.42 IMPINGEMENT SYNDROME OF LEFT SHOULDER: Primary | ICD-10-CM

## 2020-10-27 PROCEDURE — 73030 X-RAY EXAM OF SHOULDER: CPT | Mod: LT

## 2020-12-11 ENCOUNTER — CLINICAL SUPPORT (OUTPATIENT)
Dept: PRIMARY CARE | Facility: CLINIC | Age: 65
End: 2020-12-11
Payer: COMMERCIAL

## 2020-12-11 VITALS — TEMPERATURE: 98.6 F

## 2020-12-11 DIAGNOSIS — Z23 NEED FOR SHINGLES VACCINE: Primary | ICD-10-CM

## 2020-12-11 PROCEDURE — 90750 HZV VACC RECOMBINANT IM: CPT | Performed by: FAMILY MEDICINE

## 2020-12-11 PROCEDURE — 90471 IMMUNIZATION ADMIN: CPT | Performed by: FAMILY MEDICINE

## 2021-01-25 NOTE — TELEPHONE ENCOUNTER
Medicine Refill Request    Last Office Visit: 10/9/2020  Last Telemedicine Visit: Visit date not found    Next Office Visit: 10/13/2021  Next Telemedicine Visit: Visit date not found         Current Outpatient Medications:   •  amoxicillin (AMOXIL) 500 mg tablet, Take 4 tablets (2,000 mg total) by mouth once as needed (one hour prior to dental procedure.)., Disp: 4 tablet, Rfl: 5  •  aspirin 81 mg chewable tablet, Take 1 tablet (81 mg total) by mouth 2 (two) times a day. For 4 weeks to prevent blood clots., Disp: 60 tablet, Rfl: 0  •  B-complex with vitamin C tablet, Take 1 tablet by mouth daily. , Disp: , Rfl:   •  cetirizine 10 mg tablet, Take 10 mg by mouth daily., Disp: , Rfl:   •  cholecalciferol, vitamin D3, (VITAMIN D3) 2,000 unit tablet, Take 2,000 IU by mouth daily., Disp: , Rfl:   •  citalopram (celeXA) 10 mg tablet, Take 1 tablet (10 mg total) by mouth daily. (Patient taking differently: Take 30 mg by mouth daily.  ), Disp: 90 tablet, Rfl: 3  •  citalopram (celeXA) 20 mg tablet, Take 1 tablet (20 mg total) by mouth once daily., Disp: 90 tablet, Rfl: 3  •  EPINEPHrine (EPIPEN) 0.3 mg/0.3 mL injection syringe, Inject 0.3 mL (0.3 mg total) into the thigh as needed for anaphylaxis., Disp: 1 Syringe, Rfl: 0  •  fluticasone (FLONASE) 50 mcg/actuation nasal spray, Administer 2 sprays into each nostril daily as needed.  , Disp: , Rfl:   •  MULTIVITAMIN ORAL, Take 1 tablet by mouth daily.  , Disp: , Rfl:   •  omeprazole (PriLOSEC) 20 mg capsule, Take 20 mg by mouth every other day. Take 30 minutes prior to a meal. , Disp: , Rfl:       BP Readings from Last 3 Encounters:   10/09/20 120/82   01/30/20 132/72   01/16/20 96/60       Recent Lab results:  Lab Results   Component Value Date    CHOL 203 (H) 10/20/2020   ,   Lab Results   Component Value Date    HDL 74 10/20/2020   ,   Lab Results   Component Value Date    LDLCALC 119 (H) 10/20/2020   ,   Lab Results   Component Value Date    TRIG 52 10/20/2020        Lab  Results   Component Value Date    GLUCOSE 102 (H) 10/20/2020   ,   Lab Results   Component Value Date    HGBA1C 5.4 01/05/2016         Lab Results   Component Value Date    CREATININE 0.88 10/20/2020       Lab Results   Component Value Date    TSH 0.876 10/20/2020

## 2021-01-26 RX ORDER — CITALOPRAM 10 MG/1
TABLET ORAL
Qty: 90 TABLET | Refills: 3 | Status: SHIPPED | OUTPATIENT
Start: 2021-01-26 | End: 2021-10-13 | Stop reason: ALTCHOICE

## 2021-04-15 DIAGNOSIS — Z23 ENCOUNTER FOR IMMUNIZATION: ICD-10-CM

## 2021-06-07 NOTE — TELEPHONE ENCOUNTER
Medicine Refill Request    Last Office Visit: 10/9/2020  Last Telemedicine Visit: Visit date not found    Next Office Visit: 10/13/2021  Next Telemedicine Visit: Visit date not found         Current Outpatient Medications:   •  amoxicillin (AMOXIL) 500 mg tablet, Take 4 tablets (2,000 mg total) by mouth once as needed (one hour prior to dental procedure.)., Disp: 4 tablet, Rfl: 5  •  aspirin 81 mg chewable tablet, Take 1 tablet (81 mg total) by mouth 2 (two) times a day. For 4 weeks to prevent blood clots., Disp: 60 tablet, Rfl: 0  •  B-complex with vitamin C tablet, Take 1 tablet by mouth daily. , Disp: , Rfl:   •  cetirizine 10 mg tablet, Take 10 mg by mouth daily., Disp: , Rfl:   •  cholecalciferol, vitamin D3, (VITAMIN D3) 2,000 unit tablet, Take 2,000 IU by mouth daily., Disp: , Rfl:   •  citalopram (celeXA) 10 mg tablet, TAKE 1 TABLET DAILY (ADDING 10 MG DOSE TO 20 MG DOSE, TOTAL OF 30 MG PER DAY), Disp: 90 tablet, Rfl: 3  •  citalopram (celeXA) 20 mg tablet, Take 1 tablet (20 mg total) by mouth once daily., Disp: 90 tablet, Rfl: 3  •  EPINEPHrine (EPIPEN) 0.3 mg/0.3 mL injection syringe, Inject 0.3 mL (0.3 mg total) into the thigh as needed for anaphylaxis., Disp: 1 Syringe, Rfl: 0  •  fluticasone (FLONASE) 50 mcg/actuation nasal spray, Administer 2 sprays into each nostril daily as needed.  , Disp: , Rfl:   •  MULTIVITAMIN ORAL, Take 1 tablet by mouth daily.  , Disp: , Rfl:   •  omeprazole (PriLOSEC) 20 mg capsule, Take 20 mg by mouth every other day. Take 30 minutes prior to a meal. , Disp: , Rfl:       BP Readings from Last 3 Encounters:   10/09/20 120/82   01/30/20 132/72   01/16/20 96/60       Recent Lab results:  Lab Results   Component Value Date    CHOL 203 (H) 10/20/2020   ,   Lab Results   Component Value Date    HDL 74 10/20/2020   ,   Lab Results   Component Value Date    LDLCALC 119 (H) 10/20/2020   ,   Lab Results   Component Value Date    TRIG 52 10/20/2020        Lab Results   Component Value  Date    GLUCOSE 102 (H) 10/20/2020   ,   Lab Results   Component Value Date    HGBA1C 5.4 01/05/2016         Lab Results   Component Value Date    CREATININE 0.88 10/20/2020       Lab Results   Component Value Date    TSH 0.876 10/20/2020

## 2021-06-08 RX ORDER — CITALOPRAM 20 MG/1
20 TABLET, FILM COATED ORAL
Qty: 90 TABLET | Refills: 3 | Status: SHIPPED | OUTPATIENT
Start: 2021-06-08 | End: 2021-10-13 | Stop reason: ALTCHOICE

## 2021-07-23 ENCOUNTER — TELEMEDICINE (OUTPATIENT)
Dept: PRIMARY CARE | Facility: CLINIC | Age: 66
End: 2021-07-23
Payer: MEDICARE

## 2021-07-23 DIAGNOSIS — J01.00 ACUTE NON-RECURRENT MAXILLARY SINUSITIS: Primary | ICD-10-CM

## 2021-07-23 PROCEDURE — 99213 OFFICE O/P EST LOW 20 MIN: CPT | Mod: 95 | Performed by: FAMILY MEDICINE

## 2021-07-23 RX ORDER — AMOXICILLIN AND CLAVULANATE POTASSIUM 875; 125 MG/1; MG/1
1 TABLET, FILM COATED ORAL 2 TIMES DAILY
Qty: 14 TABLET | Refills: 0 | Status: SHIPPED | OUTPATIENT
Start: 2021-07-23 | End: 2021-07-30

## 2021-07-23 ASSESSMENT — ENCOUNTER SYMPTOMS
NECK STIFFNESS: 0
ARTHRALGIAS: 0
SINUS PAIN: 1
CHILLS: 0
VOMITING: 0
EYE REDNESS: 0
MYALGIAS: 0
FEVER: 0
COUGH: 1
CHEST TIGHTNESS: 0
SINUS PRESSURE: 0
NAUSEA: 0
SHORTNESS OF BREATH: 0
RHINORRHEA: 1
WHEEZING: 0
ABDOMINAL PAIN: 0
SORE THROAT: 1
CONFUSION: 0
FATIGUE: 0
DIARRHEA: 0

## 2021-07-23 NOTE — PATIENT INSTRUCTIONS
Problem List Items Addressed This Visit     Acute non-recurrent maxillary sinusitis - Primary     You have been diagnosed with an acute sinus infection.  Antibiotics are indicated at this time.  Treat with Augmentin 875 mg one tablet 2 times per day for 7 days. Please finish all of the medication in order to fully eradicate the infection.  Treat with Acetaminophen 1000 mg 3 times per day as needed for fever, headache, or sore throat.  Drink plenty of fluids: at least 8 large glasses of fluid a day. Good fluid choices are water, fruit juices high in Vitamin C, tea, gelatin, or broths and soups.   Take Mucinex D over the counter as directed on package.  Use Ocean nasal spray as often as needed.  Advised to use live active cultured yogurt while on antibiotics to avoid antibiotic associated diarrhea.  Advised to eat foods that are more binding while on antibiotics to avoid antibiotic associated diarrhea. Eat bananas, white rice, applesauce, and white breads.  Avoid antihistamines while treating the sinus infection.  Advised to call if not resolving or if worsening.

## 2021-07-23 NOTE — ASSESSMENT & PLAN NOTE
You have been diagnosed with an acute sinus infection.  Antibiotics are indicated at this time.  Treat with Augmentin 875 mg one tablet 2 times per day for 7 days. Please finish all of the medication in order to fully eradicate the infection.  Treat with Acetaminophen 1000 mg 3 times per day as needed for fever, headache, or sore throat.  Drink plenty of fluids: at least 8 large glasses of fluid a day. Good fluid choices are water, fruit juices high in Vitamin C, tea, gelatin, or broths and soups.   Take Mucinex D over the counter as directed on package.  Use Ocean nasal spray as often as needed.  Advised to use live active cultured yogurt while on antibiotics to avoid antibiotic associated diarrhea.  Advised to eat foods that are more binding while on antibiotics to avoid antibiotic associated diarrhea. Eat bananas, white rice, applesauce, and white breads.  Avoid antihistamines while treating the sinus infection.  Advised to call if not resolving or if worsening.

## 2021-07-23 NOTE — PROGRESS NOTES
Tom Douglas MD    [facility]  [address], [address]  [place] [address]  Phone: [phone]  Fax: [phone]     History of Present Illness     Subjective     Patient ID: Titus Ray is a 65 y.o. male.      Verification of Patient Location:  The patient affirms they are currently located in the following state:[place]         Request for Consent:   Audio and Video Encounter   Mariza, my name is Tom Douglas MD.  Before we proceed, can you please verify your identification by telling me your full name and date of birth?  Can you tell me who is in the room with you?    You and I are about to have a telemedicine check-in or visit because you have requested it.  This is a live video-conference.  I am a real person, speaking to you in real time.  There is no one else with me on the video-conference.  However, when we use ([employer], [employer], etc) it is important for you to know that the video-conference may not be secure or private.  I am not recording this conversation and I am asking you not to record it.  This telemedicine visit will be billed to your health insurance or you, if you are self-insured.  You understand you will be responsible for any copayments or coinsurances that apply to your telemedicine visit.  Communication platform used for this encounter:  Smar[employer] Video Visit (with Zoom integration)     Before starting our telemedicine visit, I am required to get your consent for this virtual check-in or visit by telemedicine. Do you consent?      Patient Response to Request for Consent:  Yes      URI   This is a new problem. Episode onset: 5 days ago. The problem has been gradually worsening. Associated symptoms include congestion, coughing, rhinorrhea, sinus pain, sneezing and a sore throat. Pertinent negatives include no abdominal pain, chest pain, diarrhea, ear pain, nausea, rash, vomiting or wheezing. Associated symptoms comments: Fatigue, ear pressure.  He has tried decongestant and acetaminophen for the symptoms.         Review of Systems   Constitutional: Negative for chills, fatigue and fever.   HENT: Positive for congestion, rhinorrhea, sinus pain, sneezing and sore throat. Negative for ear pain and sinus pressure.    Eyes: Negative for redness.   Respiratory: Positive for cough. Negative for chest tightness, shortness of breath and wheezing.    Cardiovascular: Negative for chest pain.   Gastrointestinal: Negative for abdominal pain, diarrhea, nausea and vomiting.   Musculoskeletal: Negative for arthralgias, myalgias and neck stiffness.   Skin: Negative for rash.   Psychiatric/Behavioral: Negative for confusion.          Past Medical/Surgical/Family/Social History       The following have been reviewed and updated as appropriate in this visit:  Allergies  Meds  Problems         Past Medical History:   Diagnosis Date   • Allergic rhinitis     Controlled with zyrtec and Fluticasone nasal spray as needed. Worse in spring and fall.   • Attention deficit disorder (ADD) in adult     Managed on Straterra in the past but not effective.   • Carpal tunnel syndrome 5/30/2018    Neurologist: Dr. Truong. EMG in 05/2018 with bilateral median neuropathy with right severe and left mild to moderate. Being treated with wrist splints.   • Complaint of nasal congestion     Dr Douglas prescribed Augmentin last day 1/16/20   • Depression     Controlled with citalopram.   • Diverticulosis 9/17/2018    Seen in the sigmoid colon during colonoscopy in 09/2018 and no history of diverticuliitis.    • GERD (gastroesophageal reflux disease)     Managed on Omeprazole. Should take medication about 30 minutes prior to meal.  The patient should avoid caffeinated products. The patient should avoid carbonated beverages. The patient was instructed not to eat within 3 hours of bedtime. Pt instructed to avoid fatty meals. Suggested weight reduction.   • IFG (impaired fasting glucose) 10/21/2020     Advised to follow a low carbohydrate diet and to reduce BMI < 25.  Advised to exercise for at least 2.5 hours per week.     • COURT on CPAP     Pulmonologist: Dr. Pulido. Managed with CPAP at 10 CM H2O   • Osteoarthritis of both knees     Orthopedic surgeon: Dr. Cheung. S/P left knee hemiarthroplasty in 2007. X-rays in 12/2014 reveal bilateral small effusions, advanced DJD left knee and mild right knee DJD. MRI of right knee (3/2015) with severe medial compartment osteoarthrosis with subchondral fracture of medial femoral condyle with associated osteochondral fracture posteriorly. Has medial femoral condyle avascular necrosis.   • Other hyperlipidemia 10/21/2020    Managed with diet. Requires periodic monitoring of lipids and liver function tests.  Advised to follow low fat diet and to keep BMI < 25.  Advised to exercise for 2.5 hours per week.     • Polyp of colon 3/7/2018    Gastroenterologist: Dr. Dunaway. S/P colonoscopy with polypectomy in 2004. Colonoscopy with polypectomy x 7 (5 tubular adenomas and 2 hyperplastic polyps) on 11/5/2014. Colonoscopy in 09/2018 with polypectomy x 5 (Tubular adenoma, sessile serrated adenoma and hyperplastic polyp). Next due in 09/2023.        Past Surgical History:   Procedure Laterality Date   • BONE CYST EXCISION Left     Left humeral bone cyst excision   • COLONOSCOPY W/ POLYPECTOMY     • JOINT REPLACEMENT      RTKR 2014, partial left knee 2008   • REVISION TOTAL KNEE ARTHROPLASTY Right 01/2020    Dr. Harman   • VARICOCELECTOMY  1985   • VASECTOMY         Family History   Problem Relation Age of Onset   • Arthritis Biological Mother    • Valvular heart disease Biological Mother    • COPD Biological Father    • Arthritis Biological Sister    • Arthritis Biological Brother    • Schizophrenia Biological Brother    • No Known Problems Biological Daughter    • No Known Problems Biological Daughter    • No Known Problems Biological Sister    • No Known Problems Biological Son         Social History     Tobacco Use   • Smoking status: Never Smoker   • Smokeless tobacco: Never Used   Vaping Use   • Vaping Use: Never used   Substance Use Topics   • Alcohol use: Yes     Alcohol/week: 10.0 - 14.0 standard drinks     Types: 10 - 14 Shots of liquor per week   • Drug use: No       Patient Care Team:  Tom Douglas MD as PCP - General       Allergies and Medications       Allergies   Allergen Reactions   • Cashew Nut    • Hydromorphone      Other reaction(s): Urticaria   • [place] Flavor      Other reaction(s):  shock, vasovagal syncope  mangos    • Pistachio Nut    • Sunflower Seed Angioedema         Current Outpatient Medications   Medication Sig Dispense Refill   • amoxicillin (AMOXIL) 500 mg tablet Take 4 tablets (2,000 mg total) by mouth once as needed (one hour prior to dental procedure.). 4 tablet 5   • aspirin 81 mg chewable tablet Take 1 tablet (81 mg total) by mouth 2 (two) times a day. For 4 weeks to prevent blood clots. 60 tablet 0   • B-complex with vitamin C tablet Take 1 tablet by mouth daily.      • cetirizine 10 mg tablet Take 10 mg by mouth daily.     • cholecalciferol, vitamin D3, (VITAMIN D3) 2,000 unit tablet Take 2,000 IU by mouth daily.     • citalopram (celeXA) 10 mg tablet TAKE 1 TABLET DAILY (ADDING 10 MG DOSE TO 20 MG DOSE, TOTAL OF 30 MG PER DAY) 90 tablet 3   • citalopram (celeXA) 20 mg tablet Take 1 tablet (20 mg total) by mouth once daily. 90 tablet 3   • EPINEPHrine (EPIPEN) 0.3 mg/0.3 mL injection syringe Inject 0.3 mL (0.3 mg total) into the thigh as needed for anaphylaxis. 1 Syringe 0   • fluticasone (FLONASE) 50 mcg/actuation nasal spray Administer 2 sprays into each nostril daily as needed.       • MULTIVITAMIN ORAL Take 1 tablet by mouth daily.       • omeprazole (PriLOSEC) 20 mg capsule Take 20 mg by mouth every other day. Take 30 minutes prior to a meal.      • amoxicillin-pot clavulanate (AUGMENTIN) 875-125 mg per tablet Take 1 tablet by mouth 2 (two) times a  "day for 7 days. 14 tablet 0     No current facility-administered medications for this visit.                Physical Examination       Objective     There were no vitals filed for this visit.    Pulse Readings from Last 5 Encounters:   10/09/20 63   01/30/20 68   01/16/20 78   10/16/19 68   04/26/19 74       Wt Readings from Last 5 Encounters:   10/09/20 123 kg (270 lb 6.4 oz)   01/28/20 116 kg (256 lb)   01/16/20 116 kg (256 lb 9.6 oz)   10/16/19 117 kg (257 lb 3.2 oz)   04/26/19 114 kg (250 lb 12.8 oz)       Ht Readings from Last 5 Encounters:   10/09/20 1.765 m (5' 9.5\")   01/28/20 1.778 m (5' 10\")   01/16/20 1.778 m (5' 10\")   10/16/19 1.778 m (5' 10\")   04/26/19 1.778 m (5' 10\")       BP Readings from Last 5 Encounters:   10/09/20 120/82   01/30/20 132/72   01/16/20 96/60   10/16/19 122/80   04/26/19 118/74       BMI Readings from Last 4 Encounters:   10/09/20 39.36 kg/m²   01/28/20 36.73 kg/m²   01/16/20 36.82 kg/m²   10/16/19 36.90 kg/m²          Laboratory Results     Lab Results   Component Value Date    WBC 4.7 10/20/2020    WBC 5.99 02/07/2020    WBC 6.87 01/31/2020    HGB 14.4 10/20/2020    HGB 12.7 (L) 02/07/2020    HGB 11.2 (L) 01/31/2020    HCT 44.2 10/20/2020    HCT 38.8 (L) 02/07/2020    HCT 35.9 (L) 01/31/2020    MCV 89 10/20/2020    MCV 87.6 02/07/2020    MCV 91.1 01/31/2020     10/20/2020     (H) 02/07/2020     01/31/2020        Lab Results   Component Value Date    GLUCOSE 102 (H) 10/20/2020    GLUCOSE 99 02/07/2020    GLUCOSE 92 01/31/2020    CALCIUM 9.4 02/07/2020    CALCIUM 8.7 (L) 01/31/2020    CALCIUM 8.2 (L) 01/29/2020     10/20/2020     02/07/2020     01/31/2020    K 4.9 10/20/2020    K 4.4 02/07/2020    K 4.4 01/31/2020    CO2 22 10/20/2020    CO2 27 02/07/2020    CO2 26 01/31/2020     10/20/2020     02/07/2020     01/31/2020    BUN 17 10/20/2020    BUN 11 02/07/2020    BUN 15 01/31/2020    CREATININE 0.88 10/20/2020    CREATININE " 0.9 02/07/2020    CREATININE 0.9 01/31/2020    ALKPHOS 78 10/20/2020    ALKPHOS 44 01/31/2020    ALKPHOS 63 03/06/2018    AST 17 10/20/2020    AST 22 01/31/2020    AST 23 03/06/2018    ALT 18 10/20/2020    ALT 22 01/31/2020    ALT 22 03/06/2018    BILITOT 0.3 10/20/2020    BILITOT 0.8 01/31/2020    BILITOT 0.5 03/06/2018    ALBUMIN 4.2 10/20/2020    ALBUMIN 3.2 (L) 01/31/2020    ALBUMIN 4.1 03/06/2018       Lab Results   Component Value Date    CHOL 203 (H) 10/20/2020    CHOL 198 03/06/2018    CHOL 179 04/05/2016     Lab Results   Component Value Date    HDL 74 10/20/2020    HDL 62 03/06/2018    HDL 53 04/05/2016     Lab Results   Component Value Date    LDLCALC 119 (H) 10/20/2020    LDLCALC 115 (H) 03/06/2018    LDLCALC 96 04/05/2016     Lab Results   Component Value Date    TRIG 52 10/20/2020    TRIG 103 03/06/2018    TRIG 152 (H) 04/05/2016       The 10-year ASCVD risk score (Yung BEGUM Jr., et al., 2013) is: 10.9%    Values used to calculate the score:      Age: 65 years      Sex: Male      Is Non- : No      Diabetic: No      Tobacco smoker: No      Systolic Blood Pressure: 120 mmHg      Is BP treated: No      HDL Cholesterol: 74 mg/dL      Total Cholesterol: 203 mg/dL    Lab Results   Component Value Date    TSH 0.876 10/20/2020    TSH 1.760 03/06/2018    TSH 1.620 04/05/2016       Lab Results   Component Value Date    HGBA1C 5.4 01/05/2016       Lab Results   Component Value Date    PSA 0.7 10/20/2020    PSA 0.8 03/06/2018    PSA 0.7 04/05/2016       Lab Results   Component Value Date    HEPCAB <0.1 03/06/2018       Immunization History   Administered Date(s) Administered   • INFLUENZA VACCINE QUAD ADJUVANTED 65 and OLDER 10/09/2020   • Influenza Split Preservative Free ID 12/19/2012   • Influenza TIV (IM) 11/03/2010   • Influenza Vaccine 65 And Older Preservative Free 10/16/2019   • Influenza Vaccine Quadrivalent 3 Yr And Older 11/11/2015, 09/25/2018   • Influenza Vaccine Quadrivalent  Preservative Free 6-35 Months 09/27/2013, 11/21/2014, 10/10/2016   • Influenza, Unspecified 11/21/2014, 10/10/2016, 11/06/2017   • Pneumococcal Conjugate 13-Valent 11/30/2018   • Pneumococcal Polysaccharide 10/09/2020   • Sars-cov-2 (Covid-19) Vaccine, Moderna 02/25/2021, 03/25/2021   • Tdap 03/03/2011   • Zoster 11/21/2014   • Zoster Vaccine Recombinant Adjuvanted (Shingrix) 12/11/2020         Health Maintenance Topics with due status: Overdue       Topic Date Due    Annual Falls Risk Screening Never done    Zoster Vaccine 02/05/2021    DTaP, Tdap, and Td Vaccines 03/03/2021     Health Maintenance Topics with due status: Not Due       Topic Last Completion Date    Colonoscopy 09/14/2018    Influenza Vaccine 10/09/2020     Health Maintenance Topics with due status: Completed       Topic Last Completion Date    Hepatitis C Screening 03/06/2018    Pneumococcal (65 years and older) 10/09/2020    COVID-19 Vaccine 03/25/2021     Health Maintenance Topics with due status: Aged Out       Topic Date Due    Meningococcal ACWY Aged Out    HIB Vaccines Aged Out    IPV Vaccines Aged Out    HPV Vaccines Aged Out    Pneumococcal Aged Out     Health Maintenance Topics with due status: Discontinued       Topic Date Due    Varicella Vaccines Discontinued        Assessment and Plan       Assessment/Plan     Problem List Items Addressed This Visit     Acute non-recurrent maxillary sinusitis - Primary    Current Assessment & Plan     You have been diagnosed with an acute sinus infection.  Antibiotics are indicated at this time.  Treat with Augmentin 875 mg one tablet 2 times per day for 7 days. Please finish all of the medication in order to fully eradicate the infection.  Treat with Acetaminophen 1000 mg 3 times per day as needed for fever, headache, or sore throat.  Drink plenty of fluids: at least 8 large glasses of fluid a day. Good fluid choices are water, fruit juices high in Vitamin C, tea, gelatin, or broths and soups.   Take  Mucinex D over the counter as directed on package.  Use Ocean nasal spray as often as needed.  Advised to use live active cultured yogurt while on antibiotics to avoid antibiotic associated diarrhea.  Advised to eat foods that are more binding while on antibiotics to avoid antibiotic associated diarrhea. Eat bananas, white rice, applesauce, and white breads.  Avoid antihistamines while treating the sinus infection.  Advised to call if not resolving or if worsening.                  No orders of the defined types were placed in this encounter.             Tom Douglas MD    No follow-ups on file.    7/23/2021      Time Spent in Medical Discussion During This Encounter:  15 minutes

## 2021-10-11 NOTE — TELEPHONE ENCOUNTER
Medicine Refill Request    Last Office Visit: 10/9/2020  Last Telemedicine Visit: 7/23/2021 Tom Douglas MD    Next Office Visit: 10/13/2021  Next Telemedicine Visit: Visit date not found         Current Outpatient Medications:   •  amoxicillin (AMOXIL) 500 mg tablet, Take 4 tablets (2,000 mg total) by mouth once as needed (one hour prior to dental procedure.)., Disp: 4 tablet, Rfl: 5  •  aspirin 81 mg chewable tablet, Take 1 tablet (81 mg total) by mouth 2 (two) times a day. For 4 weeks to prevent blood clots., Disp: 60 tablet, Rfl: 0  •  B-complex with vitamin C tablet, Take 1 tablet by mouth daily. , Disp: , Rfl:   •  cetirizine 10 mg tablet, Take 10 mg by mouth daily., Disp: , Rfl:   •  cholecalciferol, vitamin D3, (VITAMIN D3) 2,000 unit tablet, Take 2,000 IU by mouth daily., Disp: , Rfl:   •  citalopram (celeXA) 10 mg tablet, TAKE 1 TABLET DAILY (ADDING 10 MG DOSE TO 20 MG DOSE, TOTAL OF 30 MG PER DAY), Disp: 90 tablet, Rfl: 3  •  citalopram (celeXA) 20 mg tablet, Take 1 tablet (20 mg total) by mouth once daily., Disp: 90 tablet, Rfl: 3  •  EPINEPHrine (EPIPEN) 0.3 mg/0.3 mL injection syringe, Inject 0.3 mL (0.3 mg total) into the thigh as needed for anaphylaxis., Disp: 1 Syringe, Rfl: 0  •  fluticasone (FLONASE) 50 mcg/actuation nasal spray, Administer 2 sprays into each nostril daily as needed.  , Disp: , Rfl:   •  MULTIVITAMIN ORAL, Take 1 tablet by mouth daily.  , Disp: , Rfl:   •  omeprazole (PriLOSEC) 20 mg capsule, Take 20 mg by mouth every other day. Take 30 minutes prior to a meal. , Disp: , Rfl:       BP Readings from Last 3 Encounters:   10/09/20 120/82   01/30/20 132/72   01/16/20 96/60       Recent Lab results:  Lab Results   Component Value Date    CHOL 203 (H) 10/20/2020   ,   Lab Results   Component Value Date    HDL 74 10/20/2020   ,   Lab Results   Component Value Date    LDLCALC 119 (H) 10/20/2020   ,   Lab Results   Component Value Date    TRIG 52 10/20/2020        Lab Results   Component  Value Date    GLUCOSE 102 (H) 10/20/2020   ,   Lab Results   Component Value Date    HGBA1C 5.4 01/05/2016         Lab Results   Component Value Date    CREATININE 0.88 10/20/2020       Lab Results   Component Value Date    TSH 0.876 10/20/2020

## 2021-10-12 RX ORDER — AMOXICILLIN 500 MG/1
2000 TABLET, FILM COATED ORAL ONCE AS NEEDED
Qty: 4 TABLET | Refills: 5 | Status: SHIPPED | OUTPATIENT
Start: 2021-10-12 | End: 2022-10-19 | Stop reason: ALTCHOICE

## 2021-10-13 ENCOUNTER — OFFICE VISIT (OUTPATIENT)
Dept: PRIMARY CARE | Facility: CLINIC | Age: 66
End: 2021-10-13
Payer: MEDICARE

## 2021-10-13 VITALS
TEMPERATURE: 98.1 F | BODY MASS INDEX: 38.61 KG/M2 | SYSTOLIC BLOOD PRESSURE: 124 MMHG | RESPIRATION RATE: 16 BRPM | HEART RATE: 74 BPM | HEIGHT: 71 IN | WEIGHT: 275.8 LBS | DIASTOLIC BLOOD PRESSURE: 80 MMHG | OXYGEN SATURATION: 98 %

## 2021-10-13 DIAGNOSIS — E78.49 OTHER HYPERLIPIDEMIA: ICD-10-CM

## 2021-10-13 DIAGNOSIS — R35.0 URINARY FREQUENCY: ICD-10-CM

## 2021-10-13 DIAGNOSIS — Z00.00 MEDICARE ANNUAL WELLNESS VISIT, INITIAL: Primary | ICD-10-CM

## 2021-10-13 PROBLEM — J01.00 ACUTE NON-RECURRENT MAXILLARY SINUSITIS: Status: RESOLVED | Noted: 2018-11-30 | Resolved: 2021-10-13

## 2021-10-13 PROBLEM — S02.2XXA CLOSED FRACTURE OF NASAL BONES: Status: RESOLVED | Noted: 2019-04-26 | Resolved: 2021-10-13

## 2021-10-13 PROCEDURE — G0438 PPPS, INITIAL VISIT: HCPCS | Performed by: FAMILY MEDICINE

## 2021-10-13 PROCEDURE — 90694 VACC AIIV4 NO PRSRV 0.5ML IM: CPT | Performed by: FAMILY MEDICINE

## 2021-10-13 PROCEDURE — G0008 ADMIN INFLUENZA VIRUS VAC: HCPCS | Performed by: FAMILY MEDICINE

## 2021-10-13 RX ORDER — ESCITALOPRAM OXALATE 10 MG/1
10 TABLET ORAL DAILY
COMMUNITY
Start: 2021-09-27 | End: 2022-10-19

## 2021-10-13 SDOH — HEALTH STABILITY: PHYSICAL HEALTH: ON AVERAGE, HOW MANY DAYS PER WEEK DO YOU ENGAGE IN MODERATE TO STRENUOUS EXERCISE (LIKE A BRISK WALK)?: 3 DAYS

## 2021-10-13 SDOH — ECONOMIC STABILITY: FOOD INSECURITY: WITHIN THE PAST 12 MONTHS, THE FOOD YOU BOUGHT JUST DIDN'T LAST AND YOU DIDN'T HAVE MONEY TO GET MORE.: NEVER TRUE

## 2021-10-13 SDOH — ECONOMIC STABILITY: FOOD INSECURITY: WITHIN THE PAST 12 MONTHS, YOU WORRIED THAT YOUR FOOD WOULD RUN OUT BEFORE YOU GOT MONEY TO BUY MORE.: NEVER TRUE

## 2021-10-13 SDOH — HEALTH STABILITY: PHYSICAL HEALTH: ON AVERAGE, HOW MANY MINUTES DO YOU ENGAGE IN EXERCISE AT THIS LEVEL?: 50 MIN

## 2021-10-13 SDOH — ECONOMIC STABILITY: TRANSPORTATION INSECURITY
IN THE PAST 12 MONTHS, HAS LACK OF TRANSPORTATION KEPT YOU FROM MEETINGS, WORK, OR FROM GETTING THINGS NEEDED FOR DAILY LIVING?: NO

## 2021-10-13 SDOH — ECONOMIC STABILITY: TRANSPORTATION INSECURITY
IN THE PAST 12 MONTHS, HAS THE LACK OF TRANSPORTATION KEPT YOU FROM MEDICAL APPOINTMENTS OR FROM GETTING MEDICATIONS?: NO

## 2021-10-13 ASSESSMENT — ENCOUNTER SYMPTOMS
CHEST TIGHTNESS: 0
NERVOUS/ANXIOUS: 0
COUGH: 0
HEADACHES: 0
NUMBNESS: 0
MYALGIAS: 0
BRUISES/BLEEDS EASILY: 0
SLEEP DISTURBANCE: 0
SORE THROAT: 0
FEVER: 0
CHILLS: 0
WHEEZING: 0
CONFUSION: 0
DYSPHORIC MOOD: 0
FREQUENCY: 0
DYSURIA: 0
BACK PAIN: 0
WEAKNESS: 0
APPETITE CHANGE: 0
ARTHRALGIAS: 0
BLOOD IN STOOL: 0
FLANK PAIN: 0
NECK PAIN: 0
LIGHT-HEADEDNESS: 0
EYE PAIN: 0
DIARRHEA: 0
CONSTIPATION: 0
SHORTNESS OF BREATH: 0
EYE DISCHARGE: 0
FATIGUE: 0
VOMITING: 0
DIFFICULTY URINATING: 0
SINUS PAIN: 0
NECK STIFFNESS: 0
RHINORRHEA: 0
UNEXPECTED WEIGHT CHANGE: 0
DIZZINESS: 0
TROUBLE SWALLOWING: 0
HEMATURIA: 0
SINUS PRESSURE: 0
SPEECH DIFFICULTY: 0
ABDOMINAL PAIN: 0
NAUSEA: 0
EYE REDNESS: 0

## 2021-10-13 ASSESSMENT — LIFESTYLE VARIABLES
HOW MANY STANDARD DRINKS CONTAINING ALCOHOL DO YOU HAVE ON A TYPICAL DAY: 1 OR 2
HOW OFTEN DO YOU HAVE A DRINK CONTAINING ALCOHOL: 4 OR MORE TIMES A WEEK
HOW OFTEN DO YOU HAVE SIX OR MORE DRINKS ON ONE OCCASION: NEVER

## 2021-10-13 ASSESSMENT — MINI COG
COMPLETED: YES
TOTAL SCORE: 5

## 2021-10-13 ASSESSMENT — PATIENT HEALTH QUESTIONNAIRE - PHQ9
SUM OF ALL RESPONSES TO PHQ QUESTIONS 1-9: 4
SUM OF ALL RESPONSES TO PHQ9 QUESTIONS 1 & 2: 2

## 2021-10-13 ASSESSMENT — SOCIAL DETERMINANTS OF HEALTH (SDOH): HOW HARD IS IT FOR YOU TO PAY FOR THE VERY BASICS LIKE FOOD, HOUSING, MEDICAL CARE, AND HEATING?: NOT HARD AT ALL

## 2021-10-13 NOTE — PROGRESS NOTES
Subjective     Titus Ray is a 66 y.o. male who presents for an initial annual wellness visit.     Patient Care Team:  Tom Douglas MD as PCP - General    Comprehensive Medical and Social History  Patient Active Problem List   Diagnosis   • Attention deficit disorder (ADD) without hyperactivity   • Allergic rhinitis   • Polyp of colon   • Depression   • GERD (gastroesophageal reflux disease)   • COURT (obstructive sleep apnea)   • Osteoarthritis of knee   • Medicare annual wellness visit, initial   • Carpal tunnel syndrome   • Diverticulosis   • Other hyperlipidemia   • IFG (impaired fasting glucose)     Past Medical History:   Diagnosis Date   • Allergic rhinitis     Controlled with zyrtec and Fluticasone nasal spray as needed. Worse in spring and fall.   • Attention deficit disorder (ADD) in adult     Managed on Straterra in the past but not effective.   • Carpal tunnel syndrome 5/30/2018    Neurologist: Dr. Truong. EMG in 05/2018 with bilateral median neuropathy with right severe and left mild to moderate. Being treated with wrist splints.   • Complaint of nasal congestion     Dr Douglas prescribed Augmentin last day 1/16/20   • Depression 3/7/2018    Controlled with Escitalopram.    • Diverticulosis 9/17/2018    Seen in the sigmoid colon during colonoscopy in 09/2018 and no history of diverticuliitis.    • GERD (gastroesophageal reflux disease)     Managed on Omeprazole. Should take medication about 30 minutes prior to meal.  The patient should avoid caffeinated products. The patient should avoid carbonated beverages. The patient was instructed not to eat within 3 hours of bedtime. Pt instructed to avoid fatty meals. Suggested weight reduction.   • IFG (impaired fasting glucose) 10/21/2020    Advised to follow a low carbohydrate diet and to reduce BMI < 25.  Advised to exercise for at least 2.5 hours per week.     • COURT on CPAP     Pulmonologist: Dr. Pulido. Managed with CPAP at 10 CM H2O   •  Osteoarthritis of both knees     Orthopedic surgeon: Dr. Cheung. S/P left knee hemiarthroplasty in 2007. X-rays in 12/2014 reveal bilateral small effusions, advanced DJD left knee and mild right knee DJD. MRI of right knee (3/2015) with severe medial compartment osteoarthrosis with subchondral fracture of medial femoral condyle with associated osteochondral fracture posteriorly. Has medial femoral condyle avascular necrosis.   • Other hyperlipidemia 10/21/2020    Managed with diet. Requires periodic monitoring of lipids and liver function tests.  Advised to follow low fat diet and to keep BMI < 25.  Advised to exercise for 2.5 hours per week.     • Polyp of colon 3/7/2018    Gastroenterologist: Dr. Dunaway. S/P colonoscopy with polypectomy in 2004. Colonoscopy with polypectomy x 7 (5 tubular adenomas and 2 hyperplastic polyps) on 11/5/2014. Colonoscopy in 09/2018 with polypectomy x 5 (Tubular adenoma, sessile serrated adenoma and hyperplastic polyp). Next due in 09/2023.      Past Surgical History:   Procedure Laterality Date   • BONE CYST EXCISION Left     Left humeral bone cyst excision   • COLONOSCOPY W/ POLYPECTOMY     • JOINT REPLACEMENT      RTKR 2014, partial left knee 2008   • REVISION TOTAL KNEE ARTHROPLASTY Right 01/2020    Dr. Harman   • VARICOCELECTOMY  1985   • VASECTOMY       Allergies   Allergen Reactions   • Cashew Nut    • Hydromorphone      Other reaction(s): Urticaria   • Regino Ramirez Flavor      Other reaction(s):  shock, vasovagal syncope  mangos    • Pistachio Nut    • Sunflower Seed Angioedema     Current Outpatient Medications   Medication Sig Dispense Refill   • amoxicillin 500 mg tablet Take 4 tablets (2,000 mg total) by mouth once as needed (one hour prior to dental procedure.). 4 tablet 5   • aspirin 81 mg chewable tablet Take 1 tablet (81 mg total) by mouth 2 (two) times a day. For 4 weeks to prevent blood clots. 60 tablet 0   • B-complex with vitamin C tablet Take 1 tablet by mouth  daily.      • cetirizine 10 mg tablet Take 10 mg by mouth daily.     • cholecalciferol, vitamin D3, (VITAMIN D3) 2,000 unit tablet Take 2,000 IU by mouth daily.     • EPINEPHrine (EPIPEN) 0.3 mg/0.3 mL injection syringe Inject 0.3 mL (0.3 mg total) into the thigh as needed for anaphylaxis. 1 Syringe 0   • escitalopram 10 mg tablet Take 10 mg by mouth daily.     • fluticasone (FLONASE) 50 mcg/actuation nasal spray Administer 2 sprays into each nostril daily as needed.       • MULTIVITAMIN ORAL Take 1 tablet by mouth daily.       • omeprazole (PriLOSEC) 20 mg capsule Take 20 mg by mouth every other day. Take 30 minutes prior to a meal.        No current facility-administered medications for this visit.     Social History     Tobacco Use   • Smoking status: Never Smoker   • Smokeless tobacco: Never Used   Vaping Use   • Vaping Use: Never used   Substance Use Topics   • Alcohol use: Yes     Alcohol/week: 10.0 - 14.0 standard drinks     Types: 10 - 14 Shots of liquor per week   • Drug use: No     Family History   Problem Relation Age of Onset   • Arthritis Biological Mother    • Valvular heart disease Biological Mother    • COPD Biological Father    • Arthritis Biological Sister    • Arthritis Biological Brother    • Schizophrenia Biological Brother    • No Known Problems Biological Daughter    • No Known Problems Biological Daughter    • No Known Problems Biological Sister    • No Known Problems Biological Son      Review of Systems   Constitutional: Negative for appetite change, chills, fatigue, fever and unexpected weight change.   HENT: Negative for congestion, ear pain, hearing loss, nosebleeds, postnasal drip, rhinorrhea, sinus pressure, sinus pain, sneezing, sore throat, tinnitus and trouble swallowing.    Eyes: Negative for pain, discharge, redness and visual disturbance.   Respiratory: Negative for cough, chest tightness, shortness of breath and wheezing.    Cardiovascular: Negative for chest pain and leg  "swelling.   Gastrointestinal: Negative for abdominal pain, blood in stool, constipation, diarrhea, nausea and vomiting.   Endocrine: Negative for cold intolerance and heat intolerance.   Genitourinary: Negative for decreased urine volume, difficulty urinating, dysuria, flank pain, frequency, hematuria and urgency.   Musculoskeletal: Negative for arthralgias, back pain, gait problem, myalgias, neck pain and neck stiffness.   Skin: Negative for rash.   Allergic/Immunologic: Negative for environmental allergies.   Neurological: Negative for dizziness, syncope, speech difficulty, weakness, light-headedness, numbness and headaches.   Hematological: Does not bruise/bleed easily.   Psychiatric/Behavioral: Negative for behavioral problems, confusion, dysphoric mood and sleep disturbance. The patient is not nervous/anxious.          Objective   Vitals  Vitals:    10/13/21 1302   BP: 124/80   Pulse: 74   Resp: 16   Temp: 36.7 °C (98.1 °F)   TempSrc: Oral   SpO2: 98%   Weight: 125 kg (275 lb 12.8 oz)  Comment: with shoes   Height: 1.791 m (5' 10.5\")  Comment: with shoes     Body mass index is 39.01 kg/m².    Wt Readings from Last 3 Encounters:   10/13/21 125 kg (275 lb 12.8 oz)   10/09/20 123 kg (270 lb 6.4 oz)   01/28/20 116 kg (256 lb)         Physical Exam  Constitutional:       General: He is not in acute distress.     Appearance: Normal appearance. He is well-developed. He is not ill-appearing, toxic-appearing or diaphoretic.   HENT:      Head: Normocephalic and atraumatic.      Right Ear: Tympanic membrane, ear canal and external ear normal.      Left Ear: Tympanic membrane, ear canal and external ear normal.      Nose: No congestion or rhinorrhea.      Mouth/Throat:      Pharynx: No oropharyngeal exudate or posterior oropharyngeal erythema.   Eyes:      General: Lids are normal.      Conjunctiva/sclera: Conjunctivae normal.      Pupils: Pupils are equal, round, and reactive to light.   Neck:      Thyroid: No " thyromegaly.      Vascular: No carotid bruit.   Cardiovascular:      Rate and Rhythm: Normal rate and regular rhythm.      Pulses:           Popliteal pulses are 2+ on the right side and 2+ on the left side.        Dorsalis pedis pulses are 2+ on the right side and 2+ on the left side.      Heart sounds: Normal heart sounds. No murmur heard.  No gallop.    Pulmonary:      Effort: Pulmonary effort is normal. No tachypnea, accessory muscle usage or respiratory distress.      Breath sounds: Normal breath sounds. No decreased breath sounds, wheezing, rhonchi or rales.   Abdominal:      General: Bowel sounds are normal. There is no distension.      Palpations: Abdomen is soft.      Tenderness: There is no abdominal tenderness. There is no guarding or rebound.      Hernia: No hernia is present. There is no hernia in the left inguinal area or right inguinal area.   Genitourinary:     Prostate: Normal.   Musculoskeletal:      Cervical back: Full passive range of motion without pain, normal range of motion and neck supple.      Right lower leg: No edema.      Left lower leg: No edema.   Lymphadenopathy:      Cervical: No cervical adenopathy.   Skin:     General: Skin is warm and dry.      Findings: No rash.   Neurological:      Mental Status: He is alert.   Psychiatric:         Mood and Affect: Mood normal. Mood is not anxious or depressed.         Speech: Speech normal.         Behavior: Behavior normal. Behavior is cooperative.         Thought Content: Thought content normal.         Judgment: Judgment normal.           Advanced Care Plan  Does patient have advance directive?: Yes       Patient has Advance Directive: Patient has Advance Directive, has not brought in                             PHQ  Will the patient answer the depression questions?: Yes   Little interest or pleasure in doing things: Several days   Feeling down, depressed, or hopeless: Several days   Depression Risk: 2   Trouble falling or staying asleep, or  sleeping too much: Several days   Feeling tired or having little energy: Not at all   Poor appetite or overeating: Several days   Feeling bad about yourself - or that you are a failure or have let yourself or your family down: Not at all   Trouble concentrating on things, such as reading the newspaper or watching television: Not at all   Moving or speaking so slowly that other people could have noticed? Or the opposite - being so fidgety or restless that you have been moving around a lot more than usual.: Not at all   Thoughts that you would be better off dead or hurting yourself in some way: Not at all   Depression Risk Score: 4       PHQ interpretation: PHQ result indicates none-minimal depression     Mini Cog  Completed: Yes  Score: 5  Result: Negative      Get Up and Go  Result: Pass    STEADI Falls Risk  One or more falls in the last year: No           Has trouble stepping up onto a curb: No   Advised to use a cane or walker to get around safely: No   Often has to rush to the toilet: No   Feels unsteady when walking: No   Has lost some feeling in feet: No   Often feels sad or depressed: Yes   Steadies self on furniture while walking at home: No   Takes medication that makes him/her feel lightheaded or more tired than usual: No   Worried about falling: No   Takes medicine to sleep or improve mood: No   Needs to push with hands when rising from a chair: No   Falls screen completed: Yes       Immunization History   Administered Date(s) Administered   • INFLUENZA VACCINE QUAD ADJUVANTED 65 and OLDER 10/09/2020   • Influenza Split Preservative Free ID 12/19/2012   • Influenza TIV (IM) 11/03/2010   • Influenza Vaccine 65 And Older Preservative Free 10/16/2019   • Influenza Vaccine Quadrivalent 3 Yr And Older 11/11/2015, 09/25/2018   • Influenza Vaccine Quadrivalent Preservative Free 6-35 Months 09/27/2013, 11/21/2014, 10/10/2016   • Influenza, Unspecified 11/21/2014, 10/10/2016, 11/06/2017   • Pneumococcal Conjugate  13-Valent 11/30/2018   • Pneumococcal Polysaccharide 10/09/2020   • Sars-cov-2 (Covid-19) Vaccine, Moderna 02/25/2021, 03/25/2021   • Tdap 03/03/2011, 08/06/2021   • Zoster 11/21/2014   • Zoster Vaccine Recombinant Adjuvanted (Shingrix) 12/11/2020       Health Maintenance Topics with due status: Overdue       Topic Date Due    Zoster Vaccine 02/05/2021    Influenza Vaccine 08/01/2021     Health Maintenance Topics with due status: Not Due       Topic Last Completion Date    Colonoscopy 09/14/2018    DTaP, Tdap, and Td Vaccines 08/06/2021     Health Maintenance Topics with due status: Completed       Topic Last Completion Date    Hepatitis C Screening 03/06/2018    Pneumococcal (65 years and older) 10/09/2020    COVID-19 Vaccine 03/25/2021    Annual Falls Risk Screening 10/13/2021     Health Maintenance Topics with due status: Aged Out       Topic Date Due    Meningococcal ACWY Aged Out    HIB Vaccines Aged Out    IPV Vaccines Aged Out    HPV Vaccines Aged Out    Pneumococcal Aged Out       Lab Results   Component Value Date    WBC 4.7 10/20/2020    WBC 5.99 02/07/2020    WBC 6.87 01/31/2020    HGB 14.4 10/20/2020    HGB 12.7 (L) 02/07/2020    HGB 11.2 (L) 01/31/2020    HCT 44.2 10/20/2020    HCT 38.8 (L) 02/07/2020    HCT 35.9 (L) 01/31/2020    MCV 89 10/20/2020    MCV 87.6 02/07/2020    MCV 91.1 01/31/2020     10/20/2020     (H) 02/07/2020     01/31/2020       Lab Results   Component Value Date    GLUCOSE 102 (H) 10/20/2020    GLUCOSE 99 02/07/2020    GLUCOSE 92 01/31/2020    CALCIUM 9.4 02/07/2020    CALCIUM 8.7 (L) 01/31/2020    CALCIUM 8.2 (L) 01/29/2020     10/20/2020     02/07/2020     01/31/2020    K 4.9 10/20/2020    K 4.4 02/07/2020    K 4.4 01/31/2020    CO2 22 10/20/2020    CO2 27 02/07/2020    CO2 26 01/31/2020     10/20/2020     02/07/2020     01/31/2020    BUN 17 10/20/2020    BUN 11 02/07/2020    BUN 15 01/31/2020    CREATININE 0.88 10/20/2020     CREATININE 0.9 02/07/2020    CREATININE 0.9 01/31/2020       Lab Results   Component Value Date    ALT 18 10/20/2020    ALT 22 01/31/2020    ALT 22 03/06/2018    AST 17 10/20/2020    AST 22 01/31/2020    AST 23 03/06/2018    ALKPHOS 78 10/20/2020    ALKPHOS 44 01/31/2020    ALKPHOS 63 03/06/2018    BILITOT 0.3 10/20/2020    BILITOT 0.8 01/31/2020    BILITOT 0.5 03/06/2018       Lab Results   Component Value Date    CHOL 203 (H) 10/20/2020    CHOL 198 03/06/2018    CHOL 179 04/05/2016     Lab Results   Component Value Date    HDL 74 10/20/2020    HDL 62 03/06/2018    HDL 53 04/05/2016     Lab Results   Component Value Date    LDLCALC 119 (H) 10/20/2020    LDLCALC 115 (H) 03/06/2018    LDLCALC 96 04/05/2016     Lab Results   Component Value Date    TRIG 52 10/20/2020    TRIG 103 03/06/2018    TRIG 152 (H) 04/05/2016     Lab Results   Component Value Date    PSA 0.7 10/20/2020    PSA 0.8 03/06/2018    PSA 0.7 04/05/2016       Lab Results   Component Value Date    TSH 0.876 10/20/2020    TSH 1.760 03/06/2018    TSH 1.620 04/05/2016       Lab Results   Component Value Date    HEPCAB <0.1 03/06/2018       Hearing and Vision Screening  No exam data present  See HRA for relevant hearing screening response.    Assessment/Plan   Diagnoses and all orders for this visit:    Medicare annual wellness visit, initial (Primary)  Assessment & Plan:  · The patient is currently stable.   · Bloodwork was ordered including a complete blood count, complete metabolic profile, thyroid stimulating hormone, and a lipid profile.   · Further recommendations will be provided to the patient based on the results of the tests.   · The patient should continue to exercise at 70 percent of his maximal heart rate for 2.5 hours per week.   · All the recommend vaccinations are up to date.   · The patient should be evaluated by the ophthalmologist every 2 years and dentist every 6 months.   · The patient was advised to protect the skin by applying suntan  lotion containing an SPF > 30 every 2 hours while in sun or after swimming. Instructed to avoid prolonged direct sun exposure as much as possible.   · The patient's body mass index is elevated. The patient was instructed to lose weight through diet and exercise to achieve a body mass index < 25.   · The patient's colonoscopy is up to date.   · Advised to consume 1000 mg of calcium daily through the diet. If the patient is unable to consume 1000 mg through the diet, then taking calcium supplements is an acceptable alternative. The patient was informed not to consume more than 500 mg of a calcium supplement at a time.             See Patient Instructions (the written plan) which was given to the patient for PPPS and health risk factors with interventions.

## 2021-10-13 NOTE — PATIENT INSTRUCTIONS
Men's Personalized Prevention Plan Services (PPPS)         Preventive Services Checklist (Assumes Average Risk Unless Otherwise Noted)  Preventive Service Target Population and Frequency Last Done Date Due   Abdominal Aortic Aneurysm Screening Any 1 risk factor: 1) family history of AAA or 2) 65-76yo and smoked 100+ cigarettes in a lifetime (covered once)  Not needed    Alcohol Misuse Screening As necessary for those at risk (covered annually) 10/13/2021 10/2022   Cholesterol Screening As necessary >=34yo; 20-34yo if increased risk coronary artery disease (covered every 5 years)  10/20/2020 Ordered    Colorectal Cancer Screening >=51yo: Colonoscopy every 10 years, FIT annually or Cologuard every 3 years (up to 84yo for Cologuard) 09/14/2018 09/2023   Diabetes Screening Any 1 risk factor: hypertension, dyslipidemia, obesity, high glucose; or Any 2 risk factors: >=66 yo, overweight, family history diabetes (covered every 6 months) 10/20/2020 Ordered    Glaucoma Screening Any 1 risk factor: 1) diabetes, 2) family history glaucoma, 3)  >=51yo, 4)  American >=66yo (covered annually) 03/2021 03/2022   Hepatitis C Screening Any 1 risk factor: 1) born between [birth year]53, 2) blood transfusion before 1992, 3) current or past injection drug use (covered once for average risk, annually for high risk) 03/16/2018 Done    Lung Cancer Screening Low-dose chest CT if all 3 risk factors: 1) 55-78yo, 2) smoker or quit within last 15y, 3) >=30 pack years (covered annually)  Not needed    Prostate Cancer Screening 55-69 years old if patient desires test after weighing potential harms and benefits (covered annually) 10/20/2020 Ordered    Sexually Transmitted Diseases (STDs) As necessary chlamydia, gonorrhea, syphilis, hepatitis B (covered annually)  HIV if any 1 risk factor present: 1) <14yo or >66yo and at increased risk or 2) 15-66yo and ask for it (covered annually)    Low Risk    Vaccine: Hepatitis B As  "necessary if medium or high risk (series covered once)   Not needed    Vaccine: Influenza All without contraindications (covered annually.) 10/13/2021 Fall 2021   Vaccine: Pneumococcal Pneumovax + Prevnar (both covered, >=11 months apart) Prevnar   11/30/2018  Pneumovax  10/09/2020  Done    Vaccine: Shingrix (Shingles) >= 51yo without contraindications   (2 doses, 2 months apart) Zostavax   11/21/2014  Shingrix  12/11/2020    TDAP Due every 10 years  08/06/2021 08/2031                                           Men's Personalized Prevention Plan Services (PPPS)                                           Health Risk Factors and Interventions  \"x\" Indicates risk is associated with this patient  Risk Factor          x Overweight Low fat diet, exercise, and weight loss     N/A  Hypertension    N/A  Diabetes    N/A  Fall Risk    N/A  Tobacco Use                 Problem List Items Addressed This Visit     Medicare annual wellness visit, initial - Primary     · The patient is currently stable.   · Bloodwork was ordered including a complete blood count, complete metabolic profile, thyroid stimulating hormone, PSA, and a lipid profile.   · Further recommendations will be provided to the patient based on the results of the tests.   · The patient should exercise at 70 percent of his maximal heart rate for 2.5 hours per week.   · All the recommend vaccinations are now up to date. FLU shot given today.   · The patient should be evaluated by the ophthalmologist every 1-2 years and dentist every 6 months.   · The patient was advised to protect the skin by applying suntan lotion containing an SPF > 30 every 2 hours while in sun or after swimming. Instructed to avoid prolonged direct sun exposure as much as possible.   · The patient's body mass index is elevated. The patient was instructed to lose weight through diet and exercise to achieve a body mass index < 25.   · The patient's colonoscopy is up to date.   · Advised to consume " 1000 mg of calcium daily through the diet. If the patient is unable to consume 1000 mg through the diet, then taking calcium supplements is an acceptable alternative. The patient was informed not to consume more than 500 mg of a calcium supplement at a time.              Other hyperlipidemia (Chronic)    Relevant Orders    CBC and Differential    Lipid panel    Comprehensive metabolic panel    TSH 3rd Generation      Other Visit Diagnoses     Urinary frequency        Relevant Orders    PSA              Health Risk Factors with Personalized Education:  ----------------------------------------------------------------------------------------------------------------------  Stress management:  Understanding Your Stress  · Think about how you know when you’re stressed.  · Think about how your thoughts or behaviors are different when you’re stressed.  · Think about what triggers stress for you.  · Think about how you handle stress, and whether you’re making unhealthy choices in response to stress (like smoking, drinking alcohol or overeating).  Managing Stress  · Take a break from the stressful situation.  · Reduce your stress levels by exercising, talking with family/friends, ensuring adequate sleep.  · Consider meditation or yoga.  · Make sure you plan time to do things you enjoy.  · Let your PCP know if you’re having problems limiting your stress.  ----------------------------------------------------------------------------------------------------------------------  Controlling Your Blood Pressure  · Maintain a normal weight (body mass index between 18.5 and 24.9).  · Eat more fruit, vegetables and low-fat dairy.  · Eat less saturated fat and total fat.  · Lower your sodium (salt) intake.  Try to stay under 1500 mg per day, but if you cannot get your intake to be that low, at least lower it by 1000 mg.  · Stay active.  Try to get at least 90 to 150 minutes of exercise per week.  Try brisk walking, swimming, bicycling or  dancing.  · Limit alcohol intake.  When you do consume alcohol, drink no more than 2 drinks per day.  · If you have been prescribed medication, take it regularly and exactly as prescribed.  Let your PCP know if you have any problems or questions about your medication.  · Check your blood pressure at home or at the store.  Write down your readings and share them with your PCP  ----------------------------------------------------------------------------------------------------------------------  Controlling Your Cholesterol  · Reduce the amount of saturated and trans fat in your diet.  Limit intake of red meat.  Consume only low-fat or non-fat/skim dairy.  Limit fried food.  Cook with vegetable oils.  · Reduce your intake of sugary foods, sugary drinks and alcohol.  · Eat a diet high in fruit, vegetables and whole grains.  · Get protein from fish, poultry and a small portion of nuts.  · Stay active.  Try to get at least 90 to 150 minutes of exercise per week.  Try brisk walking, swimming, bicycling or dancing.  · Maintain a healthy weight by balancing your diet and exercise.  · If you have been prescribed medication, take it regularly and exactly as prescribed. Let your PCP know if you have any problems or questions about your medication.  · It’s important to know your cholesterol numbers.  When recommended by your PCP, get the cholesterol blood test.  ----------------------------------------------------------------------------------------------------------------------  Reducing Your Risk of Falls  · Tell your PCP if any of your medications make you feel tired, dizzy, lightheaded or off-balance.  · Maintain coordination, flexibility and balance by ensuring regular physical activity.  · Limit alcohol intake to 1 drink per day.  Consider avoiding all alcohol intake.  · Ensure good vision.  Visit an ophthalmologist or optometrist regularly for vision screening or to make sure your glasses / contact lens prescription is  correct.  If you need glasses or contacts, wear them.  When you get new glasses or contacts, take time to get used to them.  Do not wear sunglasses or tinted lenses when indoors.  · Ensure good hearing.  Have your hearing checked if you are having trouble hearing, or family and friends think you cannot hear them.  If you need a hearing aid, be sure it fits well and wear it.  · Get enough rest.  Ensure about 7-9 hours of sleep every day.  · Get up slowly from your bed or chairs.  Do not start walking until you are sure you feel steady.  · Wear non-skid, rubber-soled, low-heeled shoes.  Do not walk in socks, or in shoes and slippers with smooth soles.  · If your PCP or therapist recommends using a cane or walker, use it regularly.  · Make your home safer.  Increase lighting throughout the house, especially at the top and bottom of stairs.  Ensure lighting is easily turned on when getting up in the middle of the night.  Make sure there are two secure rails on all stairs.  Install grab bars in the bathtub / shower and near the toilet.  Consider using a shower chair and / or a hand-held shower.  · Spread sand or salt on icy surfaces.  Beware of wet surfaces, which can be icy.  · Tell your PCP if you have fallen.

## 2021-10-13 NOTE — ASSESSMENT & PLAN NOTE
· The patient is currently stable.   · Bloodwork was ordered including a complete blood count, complete metabolic profile, thyroid stimulating hormone, PSA, and a lipid profile.   · Further recommendations will be provided to the patient based on the results of the tests.   · The patient should exercise at 70 percent of his maximal heart rate for 2.5 hours per week.   · All the recommend vaccinations are now up to date. FLU shot given today.   · The patient should be evaluated by the ophthalmologist every 1-2 years and dentist every 6 months.   · The patient was advised to protect the skin by applying suntan lotion containing an SPF > 30 every 2 hours while in sun or after swimming. Instructed to avoid prolonged direct sun exposure as much as possible.   · The patient's body mass index is elevated. The patient was instructed to lose weight through diet and exercise to achieve a body mass index < 25.   · The patient's colonoscopy is up to date.   · Advised to consume 1000 mg of calcium daily through the diet. If the patient is unable to consume 1000 mg through the diet, then taking calcium supplements is an acceptable alternative. The patient was informed not to consume more than 500 mg of a calcium supplement at a time.

## 2021-11-01 ENCOUNTER — APPOINTMENT (OUTPATIENT)
Dept: LAB | Facility: CLINIC | Age: 66
End: 2021-11-01
Attending: FAMILY MEDICINE
Payer: MEDICARE

## 2021-11-01 DIAGNOSIS — R35.0 URINARY FREQUENCY: ICD-10-CM

## 2021-11-01 DIAGNOSIS — E78.49 OTHER HYPERLIPIDEMIA: ICD-10-CM

## 2021-11-01 LAB
ALBUMIN SERPL-MCNC: 3.5 G/DL (ref 3.4–5)
ALP SERPL-CCNC: 57 IU/L (ref 35–126)
ALT SERPL-CCNC: 18 IU/L (ref 16–63)
ANION GAP SERPL CALC-SCNC: 11 MEQ/L (ref 3–15)
AST SERPL-CCNC: 18 IU/L (ref 15–41)
BASOPHILS # BLD: 0.04 K/UL (ref 0.01–0.1)
BASOPHILS NFR BLD: 0.8 %
BILIRUB SERPL-MCNC: 0.8 MG/DL (ref 0.3–1.2)
BUN SERPL-MCNC: 14 MG/DL (ref 8–20)
CALCIUM SERPL-MCNC: 9 MG/DL (ref 8.9–10.3)
CHLORIDE SERPL-SCNC: 106 MEQ/L (ref 98–109)
CHOLEST SERPL-MCNC: 183 MG/DL
CO2 SERPL-SCNC: 25 MEQ/L (ref 22–32)
CREAT SERPL-MCNC: 1 MG/DL (ref 0.8–1.3)
DIFFERENTIAL METHOD BLD: ABNORMAL
EOSINOPHIL # BLD: 0.23 K/UL (ref 0.04–0.54)
EOSINOPHIL NFR BLD: 4.7 %
ERYTHROCYTE [DISTWIDTH] IN BLOOD BY AUTOMATED COUNT: 13.2 % (ref 11.6–14.4)
GFR SERPL CREATININE-BSD FRML MDRD: >60 ML/MIN/1.73M*2
GLUCOSE SERPL-MCNC: 93 MG/DL (ref 70–99)
HCT VFR BLDCO AUTO: 44.2 % (ref 40.1–51)
HDLC SERPL-MCNC: 62 MG/DL
HDLC SERPL: 3 {RATIO}
HGB BLD-MCNC: 14.1 G/DL (ref 13.7–17.5)
IMM GRANULOCYTES # BLD AUTO: 0.01 K/UL (ref 0–0.08)
IMM GRANULOCYTES NFR BLD AUTO: 0.2 %
LDLC SERPL CALC-MCNC: 111 MG/DL
LYMPHOCYTES # BLD: 1.09 K/UL (ref 1.2–3.5)
LYMPHOCYTES NFR BLD: 22.1 %
MCH RBC QN AUTO: 28.9 PG (ref 28–33.2)
MCHC RBC AUTO-ENTMCNC: 31.9 G/DL (ref 32.2–36.5)
MCV RBC AUTO: 90.6 FL (ref 83–98)
MONOCYTES # BLD: 0.46 K/UL (ref 0.3–1)
MONOCYTES NFR BLD: 9.3 %
NEUTROPHILS # BLD: 3.11 K/UL (ref 1.7–7)
NEUTS SEG NFR BLD: 62.9 %
NONHDLC SERPL-MCNC: 121 MG/DL
NRBC BLD-RTO: 0 %
PDW BLD AUTO: 10.7 FL (ref 9.4–12.4)
PLATELET # BLD AUTO: 253 K/UL (ref 150–350)
POTASSIUM SERPL-SCNC: 4.8 MEQ/L (ref 3.6–5.1)
PROT SERPL-MCNC: 5.6 G/DL (ref 6–8.2)
PSA SERPL-MCNC: 0.59 NG/ML
RBC # BLD AUTO: 4.88 M/UL (ref 4.5–5.8)
SODIUM SERPL-SCNC: 142 MEQ/L (ref 136–144)
TRIGL SERPL-MCNC: 49 MG/DL (ref 30–149)
TSH SERPL DL<=0.05 MIU/L-ACNC: 0.55 MIU/L (ref 0.34–5.6)
WBC # BLD AUTO: 4.94 K/UL (ref 3.8–10.5)

## 2021-11-01 PROCEDURE — 84153 ASSAY OF PSA TOTAL: CPT

## 2021-11-01 PROCEDURE — 80053 COMPREHEN METABOLIC PANEL: CPT

## 2021-11-01 PROCEDURE — 84443 ASSAY THYROID STIM HORMONE: CPT

## 2021-11-01 PROCEDURE — 80061 LIPID PANEL: CPT

## 2021-11-01 PROCEDURE — 85025 COMPLETE CBC W/AUTO DIFF WBC: CPT

## 2021-11-01 PROCEDURE — 36415 COLL VENOUS BLD VENIPUNCTURE: CPT

## 2021-11-02 ENCOUNTER — DOCUMENTATION (OUTPATIENT)
Dept: PRIMARY CARE | Facility: CLINIC | Age: 66
End: 2021-11-02

## 2021-11-02 NOTE — PROGRESS NOTES
Patient notified of blood test results.  LDL slightly elevated.  Recommend low-fat diet and weight loss through diet and exercise.  Protein levels are slightly low and recommend increase protein in the diet.  All other blood tests are stable.  No other new recommendations advised at this time.

## 2022-03-17 ENCOUNTER — OFFICE VISIT (OUTPATIENT)
Dept: PULMONOLOGY | Facility: CLINIC | Age: 67
End: 2022-03-17
Payer: MEDICARE

## 2022-03-17 VITALS
BODY MASS INDEX: 40.09 KG/M2 | HEIGHT: 70 IN | HEART RATE: 79 BPM | WEIGHT: 280 LBS | DIASTOLIC BLOOD PRESSURE: 80 MMHG | SYSTOLIC BLOOD PRESSURE: 124 MMHG | RESPIRATION RATE: 18 BRPM | OXYGEN SATURATION: 97 %

## 2022-03-17 DIAGNOSIS — G47.33 OBSTRUCTIVE SLEEP APNEA: Primary | ICD-10-CM

## 2022-03-17 DIAGNOSIS — R06.83 SNORING: ICD-10-CM

## 2022-03-17 DIAGNOSIS — K57.90 DIVERTICULOSIS: Chronic | ICD-10-CM

## 2022-03-17 DIAGNOSIS — F98.8 ATTENTION DEFICIT DISORDER (ADD) WITHOUT HYPERACTIVITY: Chronic | ICD-10-CM

## 2022-03-17 DIAGNOSIS — E78.49 OTHER HYPERLIPIDEMIA: Chronic | ICD-10-CM

## 2022-03-17 DIAGNOSIS — J30.2 SEASONAL ALLERGIC RHINITIS, UNSPECIFIED TRIGGER: Chronic | ICD-10-CM

## 2022-03-17 DIAGNOSIS — G47.10 HYPERSOMNOLENCE: ICD-10-CM

## 2022-03-17 DIAGNOSIS — G47.34 NOCTURNAL HYPOXEMIA: ICD-10-CM

## 2022-03-17 DIAGNOSIS — K21.9 GASTROESOPHAGEAL REFLUX DISEASE, UNSPECIFIED WHETHER ESOPHAGITIS PRESENT: Chronic | ICD-10-CM

## 2022-03-17 PROCEDURE — 99205 OFFICE O/P NEW HI 60 MIN: CPT | Mod: 25 | Performed by: INTERNAL MEDICINE

## 2022-03-17 PROCEDURE — 94375 RESPIRATORY FLOW VOLUME LOOP: CPT | Performed by: INTERNAL MEDICINE

## 2022-03-17 NOTE — PROGRESS NOTES
Dear Dr. Douglas, Tom LATHAM MD,    Thank you for the opportunity to evaluate your patient Titus Ray a 66 y.o.  male who is seen today in the office for evaluation and management of obstructive sleep apnea.      History of Present Illness:  Deric is a delightful 66-year-old male with no history of tobacco abuse who has a significant past medical history of GERD, depression, diverticulosis, osteoarthritis and allergic rhinitis.  He was diagnosed in 2007 with moderate to severe obstructive sleep apnea with nocturnal hypoxemia.  He was initiated on CPAP 10 cm H2O with a Respironics and a Paykel Acuña nasal mask.  He has been using the same CPAP for approximately 15 years and has been having increasing mechanical difficulties.  Unfortunately, there is no chip to be able to interrogate his machine and evaluate efficacy and compliance data.     With use of his CPAP averaging proximately 6 to 8 hours of use per night, he has had no further snoring, no nocturnal respiratory awakening but has been having increasing daytime sleepiness.  Over the last year he has been requiring naps at least for an hour per day.  He has no morning headaches and no dry mouth.  Typically gets into bed for sleep at 11 PM with short sleep latency and awakens at 7 AM feeling refreshed.  His sleepiness develops the mid to late afternoon and progresses unless he is able to nap.  He denies leg jerks, sleepwalking, night terrors, hypnagogic or hypnopompic hallucinations, cataplexy or sleep paralysis.  He limits his caffeine to before noon.  He will often have 1 to 2 glasses of wine with dinner.  His mother was suspected of having COURT but no definitive diagnosis.    He has had no significant cough, sputum, wheezing, chest tightness or resting dyspnea.  Exercise tolerance is stable.  There is been no hemoptysis.  The upper airway is clear.  There is no dyspepsia,, dysphasia or dysphonia.  There is no chest pain, leg pain or swelling palpitations,  dizziness or syncope.  There is no fever chills, night sweats or constitutional symptoms.  He has had about a 20 pound weight gain over the last couple of years and estimates that he is about 70 pounds above his ideal body weight.  There has been no significant recent travel ,exposures or contacts.    As mentioned he has no primary or secondhand tobacco use.  He has no alcohol or Somes abuse.  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  Environmental survey is negative.    Past Medical History:   Diagnosis Date   • Allergic rhinitis     Controlled with zyrtec and Fluticasone nasal spray as needed. Worse in spring and fall.   • Attention deficit disorder (ADD) in adult     Managed on Straterra in the past but not effective.   • Carpal tunnel syndrome 5/30/2018    Neurologist: Dr. Truong. EMG in 05/2018 with bilateral median neuropathy with right severe and left mild to moderate. Being treated with wrist splints.   • Complaint of nasal congestion     Dr Douglas prescribed Augmentin last day 1/16/20   • Depression 3/7/2018    Controlled with Escitalopram.    • Diverticulosis 9/17/2018    Seen in the sigmoid colon during colonoscopy in 09/2018 and no history of diverticuliitis.    • GERD (gastroesophageal reflux disease)     Managed on Omeprazole. Should take medication about 30 minutes prior to meal.  The patient should avoid caffeinated products. The patient should avoid carbonated beverages. The patient was instructed not to eat within 3 hours of bedtime. Pt instructed to avoid fatty meals. Suggested weight reduction.   • IFG (impaired fasting glucose) 10/21/2020    Advised to follow a low carbohydrate diet and to reduce BMI < 25.  Advised to exercise for at least 2.5 hours per week.     • COURT on CPAP     Pulmonologist: Dr. Pulido. Managed with CPAP at 10 CM H2O   • Osteoarthritis of both knees     Orthopedic surgeon: Dr. Cheung. S/P left knee hemiarthroplasty in 2007. X-rays in 12/2014 reveal bilateral  small effusions, advanced DJD left knee and mild right knee DJD. MRI of right knee (3/2015) with severe medial compartment osteoarthrosis with subchondral fracture of medial femoral condyle with associated osteochondral fracture posteriorly. Has medial femoral condyle avascular necrosis.   • Other hyperlipidemia 10/21/2020    Managed with diet. Requires periodic monitoring of lipids and liver function tests.  Advised to follow low fat diet and to keep BMI < 25.  Advised to exercise for 2.5 hours per week.     • Polyp of colon 3/7/2018    Gastroenterologist: Dr. Dunaway. S/P colonoscopy with polypectomy in 2004. Colonoscopy with polypectomy x 7 (5 tubular adenomas and 2 hyperplastic polyps) on 11/5/2014. Colonoscopy in 09/2018 with polypectomy x 5 (Tubular adenoma, sessile serrated adenoma and hyperplastic polyp). Next due in 09/2023.      Past Surgical History:   Procedure Laterality Date   • BONE CYST EXCISION Left     Left humeral bone cyst excision   • COLONOSCOPY W/ POLYPECTOMY     • JOINT REPLACEMENT      RTKR 2014, partial left knee 2008   • REVISION TOTAL KNEE ARTHROPLASTY Right 01/2020    Dr. Harman   • VARICOCELECTOMY  1985   • VASECTOMY       Allergies   Allergen Reactions   • Cashew Nut    • Hydromorphone      Other reaction(s): Urticaria   • Golden Flavor      Other reaction(s):  shock, vasovagal syncope  mangos    • Pistachio Nut    • Sunflower Seed Angioedema     Social History     Socioeconomic History   • Marital status:      Spouse name: Marisela    • Number of children: 3   • Years of education: None   • Highest education level: None   Occupational History   • Occupation:      Comment: 3 days week    Tobacco Use   • Smoking status: Never Smoker   • Smokeless tobacco: Never Used   Vaping Use   • Vaping Use: Never used   Substance and Sexual Activity   • Alcohol use: Yes     Alcohol/week: 10.0 - 14.0 standard drinks     Types: 10 - 14 Shots of liquor per week   • Drug use: No    • Sexual activity: Yes   Other Topics Concern   • None   Social History Narrative    Exercise: Trainer 2 times per week Walking 2 times per week for one hour     Social Determinants of Health     Financial Resource Strain: Low Risk    • Difficulty of Paying Living Expenses: Not hard at all   Food Insecurity: No Food Insecurity   • Worried About Running Out of Food in the Last Year: Never true   • Ran Out of Food in the Last Year: Never true   Transportation Needs: No Transportation Needs   • Lack of Transportation (Medical): No   • Lack of Transportation (Non-Medical): No   Physical Activity: Sufficiently Active   • Days of Exercise per Week: 3 days   • Minutes of Exercise per Session: 50 min   Stress: Not on file   Social Connections: Not on file   Intimate Partner Violence: Not on file   Housing Stability: Not on file     Family History   Problem Relation Age of Onset   • Arthritis Biological Mother    • Valvular heart disease Biological Mother    • COPD Biological Father    • Arthritis Biological Sister    • Arthritis Biological Brother    • Schizophrenia Biological Brother    • No Known Problems Biological Daughter    • No Known Problems Biological Daughter    • No Known Problems Biological Sister    • No Known Problems Biological Son        Current Outpatient Medications:   •  aspirin 81 mg chewable tablet, Take 1 tablet (81 mg total) by mouth 2 (two) times a day. For 4 weeks to prevent blood clots., Disp: 60 tablet, Rfl: 0  •  B-complex with vitamin C tablet, Take 1 tablet by mouth daily. , Disp: , Rfl:   •  cetirizine 10 mg tablet, Take 10 mg by mouth daily., Disp: , Rfl:   •  EPINEPHrine (EPIPEN) 0.3 mg/0.3 mL injection syringe, Inject 0.3 mL (0.3 mg total) into the thigh as needed for anaphylaxis., Disp: 1 Syringe, Rfl: 0  •  escitalopram 10 mg tablet, Take 10 mg by mouth daily., Disp: , Rfl:   •  fluticasone (FLONASE) 50 mcg/actuation nasal spray, Administer 2 sprays into each nostril daily as needed.  ,  "Disp: , Rfl:   •  MULTIVITAMIN ORAL, Take 1 tablet by mouth daily.  , Disp: , Rfl:   •  omeprazole (PriLOSEC) 20 mg capsule, Take 20 mg by mouth every other day. Take 30 minutes prior to a meal. , Disp: , Rfl:   •  amoxicillin 500 mg tablet, Take 4 tablets (2,000 mg total) by mouth once as needed (one hour prior to dental procedure.). (Patient not taking: Reported on 3/17/2022 ), Disp: 4 tablet, Rfl: 5  •  cholecalciferol, vitamin D3, (VITAMIN D3) 2,000 unit tablet, Take 2,000 IU by mouth daily., Disp: , Rfl:   Immunization History   Administered Date(s) Administered   • INFLUENZA VACCINE QUAD ADJUVANTED 65 and OLDER 10/09/2020, 10/13/2021   • Influenza Split Preservative Free ID 12/19/2012   • Influenza TIV (IM) 11/03/2010   • Influenza Vaccine 65 And Older Preservative Free 10/16/2019   • Influenza Vaccine Quadrivalent 3 Yr And Older 11/11/2015, 09/25/2018   • Influenza Vaccine Quadrivalent Preservative Free 6-35 Months 09/27/2013, 11/21/2014, 10/10/2016   • Influenza, Unspecified 11/21/2014, 10/10/2016, 11/06/2017   • Pneumococcal Conjugate 13-Valent 11/30/2018   • Pneumococcal Polysaccharide 10/09/2020   • Sars-cov-2 (Covid-19) Vaccine, Moderna 02/25/2021, 03/25/2021   • Tdap 03/03/2011, 08/06/2021   • Zoster 11/21/2014   • Zoster Vaccine Recombinant Adjuvanted (Shingrix) 12/11/2020, 03/11/2021     Immunization status: up to date and documented.    Review of Systems:  A Full 14 point review of systems was obtained and is negative then otherwise stated in the HPI.      Physical Examination:  This is a well-developed well-nourished 66 y.o.year old male in no acute distress.  Vital Signs:   Visit Vitals  /80   Pulse 79   Resp 18   Ht 1.778 m (5' 10\")   Wt 127 kg (280 lb)   SpO2 97%   BMI 40.18 kg/m²     HEENT:  Normocephalic, atraumatic.  PERRLA. There is no nasal mucosal inflammation or pharyngitis.  Severe crowding of the posterior oropharynx with macroglossia greatest at the base.    Eyes:  Sclera " anicteric, conjunctiva pink, pupils equal and reactive to light  Neck: no adenopathy, no JVD.  Chest:  Lungs clear to auscultation and percussion bilaterally, no crackles, rubs, rhonchi or wheezing.  Cor:  RR, normal S1 and S2, no murmurs, gallops or rubs.  There is no accentuated P2 or right ventricular heave.  Abd: Soft and  nontender, nondistended, normal bowel sounds.  There is no hepatosplenomegaly.  Extremities: no clubbing, cyanosis or edema.  Skin: no rash, jaundice or petechiae.  Neuro: alert and oriented, no focal deficits.  Lymph nodes:There is no palpable peripheral lymphadenopathy.  Joints: no deformities, no warmth or erythema.  Psych: normal affect, good eye contact.      Diagnostic Data:      Labs:  I have personally reviewed all available pertinent patient laboratory results. Labs of note discussed below:  ABG Results    No lab values to display.       CBC Results       11/01/21 10/20/20 02/07/20     1016 0924 0650    WBC 4.94 4.7 5.99    RBC 4.88 4.99 4.43    HGB 14.1 14.4 12.7    HCT 44.2 44.2 38.8    MCV 90.6 89 87.6    MCH 28.9 28.9 28.7    MCHC 31.9 32.6 32.7     277 362        BMP Results       11/01/21 10/20/20 02/07/20     1016 0924 0650     140 138    K 4.8 4.9 4.4    Cl 106 106 103    CO2 25 22 27    Glucose 93 102 99    BUN 14 17 11    Creatinine 1.0 0.88 0.9    Calcium 9.0 -- 9.4    Anion Gap 11 -- 8    EGFR >60.0 89 >60.0      103           PFT: Spirometric data performed today is good technical study.  Spirograms reach plateau.  Forced vital capacity 3.82 L (85% predicted).  FEV1 2.81 L (84% predicted).  FEV1 percent 74%.  FEF 25 to 75%, 80% predicted.  Flow volume loops are normal.  Impression: This is a normal study.  No previous studies are presently available for review.    Imaging:  I have independently reviewed all available pertinent imaging.      Assessment:   #1.  Moderate to severe obstructive sleep apnea diagnosed approximately 2007 with significant  symptomatology and risk factors that include crowding of the posterior oropharynx and increased nuchal and truncal body mass.  While the application of CPAP is sufficient to eliminate symptoms it is now over 15 years old and increasingly mechanically dysfunctional.  At this point he will need a repeat sleep study to obtain new equipment with further recommendations based on the results.        Plan:  #1.  Proceed with in lab diagnostic digital polysomnogram with further recommendations based on these findings.  #2.  I would continue with the present CPAP for now.  #3.  We have discussed the importance of optimal sleep hygiene trying to achieve 8 hours of use per night with his equipment.  #4.  Continue optimizing sleep hygiene, maintaining optimal body mass index with some judicious weight loss, avoiding sleeping supine, limiting alcohol and sedatives, limiting caffeine to before noon and absolutely not operating machinery especially a motor vehicle if feeling fatigued.  #5.  We have discussed appropriate cleaning instructions as well as regular replacement of tubing, filters and interface.  #6.  I would recommend annual seasonal flu vaccine and continue with appropriate immunizations in general.  #7.  Otherwise continue the present plan for now.    I have discussed my findings concerns and recommendations at length with the patient.  All questions were asked and answered.  Evaluation of supplies and equipment were performed.  Education and instructions have been provided. Follow-up has been arranged.  I will keep you apprised of my findings.  Please let me know if you have any questions.  Many thanks. Best regards.     Sincerely,    Hugo Pozo MD    3/17/2022    This letter was generated using speech recognition software.  Please excuse any typographical errors.

## 2022-04-06 ENCOUNTER — HOSPITAL ENCOUNTER (OUTPATIENT)
Dept: SLEEP MEDICINE | Facility: CLINIC | Age: 67
Discharge: HOME | End: 2022-04-06
Attending: INTERNAL MEDICINE
Payer: MEDICARE

## 2022-04-06 PROCEDURE — 95810 POLYSOM 6/> YRS 4/> PARAM: CPT

## 2022-05-11 ENCOUNTER — TELEPHONE (OUTPATIENT)
Dept: PULMONOLOGY | Facility: CLINIC | Age: 67
End: 2022-05-11
Payer: MEDICARE

## 2022-09-28 ENCOUNTER — OFFICE VISIT (OUTPATIENT)
Dept: PULMONOLOGY | Facility: CLINIC | Age: 67
End: 2022-09-28
Payer: MEDICARE

## 2022-09-28 VITALS
SYSTOLIC BLOOD PRESSURE: 130 MMHG | HEART RATE: 80 BPM | WEIGHT: 280 LBS | OXYGEN SATURATION: 97 % | BODY MASS INDEX: 40.09 KG/M2 | RESPIRATION RATE: 18 BRPM | DIASTOLIC BLOOD PRESSURE: 74 MMHG | HEIGHT: 70 IN

## 2022-09-28 DIAGNOSIS — G47.10 HYPERSOMNOLENCE: ICD-10-CM

## 2022-09-28 DIAGNOSIS — G47.34 NOCTURNAL HYPOXEMIA: ICD-10-CM

## 2022-09-28 DIAGNOSIS — G47.33 OBSTRUCTIVE SLEEP APNEA: Primary | ICD-10-CM

## 2022-09-28 DIAGNOSIS — K21.9 GASTROESOPHAGEAL REFLUX DISEASE, UNSPECIFIED WHETHER ESOPHAGITIS PRESENT: ICD-10-CM

## 2022-09-28 DIAGNOSIS — J30.2 SEASONAL ALLERGIC RHINITIS, UNSPECIFIED TRIGGER: ICD-10-CM

## 2022-09-28 DIAGNOSIS — F98.8 ATTENTION DEFICIT DISORDER (ADD) WITHOUT HYPERACTIVITY: ICD-10-CM

## 2022-09-28 DIAGNOSIS — R06.83 SNORING: ICD-10-CM

## 2022-09-28 DIAGNOSIS — K57.90 DIVERTICULOSIS: ICD-10-CM

## 2022-09-28 DIAGNOSIS — E78.49 OTHER HYPERLIPIDEMIA: ICD-10-CM

## 2022-09-28 PROCEDURE — 99214 OFFICE O/P EST MOD 30 MIN: CPT | Performed by: INTERNAL MEDICINE

## 2022-09-28 RX ORDER — AZELASTINE 1 MG/ML
2 SPRAY, METERED NASAL 2 TIMES DAILY
Qty: 30 ML | Refills: 6 | Status: SHIPPED | OUTPATIENT
Start: 2022-09-28

## 2022-09-28 NOTE — PROGRESS NOTES
Pulmonary Evaluation         Dear Dr. Douglas, Tom LATHAM MD,      Thank you for the opportunity to reevaluate your patient Titus Ray a 66 y.o.  male who is seen today in the office for follow-up.    History of Present Illness:  Deric is reevaluated the office today with a history of moderate to severe obstructive sleep apnea diagnosed in 2007 who initially presented for dysfunctional equipment.  There have been no clinical mitigating factors relative to his sleep disorder; however, repeat polysomnogram showed no evidence of COURT.  Risk factors for COURT include crowding of the posterior oropharynx and increased nuchal and truncal body mass with BMI greater than 40 kg/m².  He continues to have loud disruptive snoring, multiple nocturnal awakening and daytime somnolence if he is not using his equipment.  He has no symptoms with use of his CPAP.  He denies leg jerks, sleepwalking, night terrors, hypnagogic or hypnopompic hallucinations, cataplexy or sleep paralysis.    He has had no significant cough, sputum, wheezing, chest tightness or resting dyspnea.  Exercise tolerance is stable.  There is been no hemoptysis.  The upper airway is clear.  There is no dyspepsia,, dysphasia or dysphonia.  There is no chest pain, leg pain or swelling palpitations, dizziness or syncope.  There is no fever chills, night sweats or constitutional symptoms.  He has had about a 20 pound weight gain over the last couple of years and estimates that he is about 70 pounds above his ideal body weight.  There has been no significant recent travel ,exposures or contacts    He has no history of tobacco use.  He has no inhalation, occupational or recreational exposures.  Environmental survey is negative.      Past Medical History:   Diagnosis Date    Allergic rhinitis     Controlled with zyrtec and Fluticasone nasal spray as needed. Worse in spring and fall.    Attention deficit disorder (ADD) in adult     Managed on Straterra in the past but  not effective.    Carpal tunnel syndrome 5/30/2018    Neurologist: Dr. Truong. EMG in 05/2018 with bilateral median neuropathy with right severe and left mild to moderate. Being treated with wrist splints.    Complaint of nasal congestion     Dr Douglas prescribed Augmentin last day 1/16/20    Depression 3/7/2018    Controlled with Escitalopram.     Diverticulosis 9/17/2018    Seen in the sigmoid colon during colonoscopy in 09/2018 and no history of diverticuliitis.     GERD (gastroesophageal reflux disease)     Managed on Omeprazole. Should take medication about 30 minutes prior to meal.  The patient should avoid caffeinated products. The patient should avoid carbonated beverages. The patient was instructed not to eat within 3 hours of bedtime. Pt instructed to avoid fatty meals. Suggested weight reduction.    IFG (impaired fasting glucose) 10/21/2020    Advised to follow a low carbohydrate diet and to reduce BMI < 25.  Advised to exercise for at least 2.5 hours per week.      COURT on CPAP     Pulmonologist: Dr. Pulido. Managed with CPAP at 10 CM H2O    Osteoarthritis of both knees     Orthopedic surgeon: Dr. Cheung. S/P left knee hemiarthroplasty in 2007. X-rays in 12/2014 reveal bilateral small effusions, advanced DJD left knee and mild right knee DJD. MRI of right knee (3/2015) with severe medial compartment osteoarthrosis with subchondral fracture of medial femoral condyle with associated osteochondral fracture posteriorly. Has medial femoral condyle avascular necrosis.    Other hyperlipidemia 10/21/2020    Managed with diet. Requires periodic monitoring of lipids and liver function tests.  Advised to follow low fat diet and to keep BMI < 25.  Advised to exercise for 2.5 hours per week.      Polyp of colon 3/7/2018    Gastroenterologist: Dr. Dunaway. S/P colonoscopy with polypectomy in 2004. Colonoscopy with polypectomy x 7 (5 tubular adenomas and 2 hyperplastic polyps) on 11/5/2014. Colonoscopy  in 09/2018 with polypectomy x 5 (Tubular adenoma, sessile serrated adenoma and hyperplastic polyp). Next due in 09/2023.      Past Surgical History:   Procedure Laterality Date    BONE CYST EXCISION Left     Left humeral bone cyst excision    COLONOSCOPY W/ POLYPECTOMY      JOINT REPLACEMENT      RTKR 2014, partial left knee 2008    REVISION TOTAL KNEE ARTHROPLASTY Right 01/2020    Dr. Harman    VARICOCELECTOMY  1985    VASECTOMY       Allergies   Allergen Reactions    Cashew Nut     Hydromorphone      Other reaction(s): Urticaria    Bent Flavor      Other reaction(s):  shock, vasovagal syncope  mangos     Pistachio Nut     Sunflower Seed Angioedema     Social History     Socioeconomic History    Marital status:      Spouse name: Marisela     Number of children: 3    Years of education: None    Highest education level: None   Occupational History    Occupation:      Comment: 3 days week    Tobacco Use    Smoking status: Never Smoker    Smokeless tobacco: Never Used   Vaping Use    Vaping Use: Never used   Substance and Sexual Activity    Alcohol use: Yes     Alcohol/week: 10.0 - 14.0 standard drinks     Types: 10 - 14 Shots of liquor per week    Drug use: No    Sexual activity: Yes   Social History Narrative    Exercise: Trainer 2 times per week Walking 2 times per week for one hour     Social Determinants of Health     Financial Resource Strain: Low Risk     Difficulty of Paying Living Expenses: Not hard at all   Food Insecurity: No Food Insecurity    Worried About Running Out of Food in the Last Year: Never true    Ran Out of Food in the Last Year: Never true   Transportation Needs: No Transportation Needs    Lack of Transportation (Medical): No    Lack of Transportation (Non-Medical): No   Physical Activity: Sufficiently Active    Days of Exercise per Week: 3 days    Minutes of Exercise per Session: 50 min     Family History   Problem Relation Age of Onset     Arthritis Biological Mother     Valvular heart disease Biological Mother     COPD Biological Father     Arthritis Biological Sister     Arthritis Biological Brother     Schizophrenia Biological Brother     No Known Problems Biological Daughter     No Known Problems Biological Daughter     No Known Problems Biological Sister     No Known Problems Biological Son        Current Outpatient Medications:     aspirin 81 mg chewable tablet, Take 1 tablet (81 mg total) by mouth 2 (two) times a day. For 4 weeks to prevent blood clots., Disp: 60 tablet, Rfl: 0    azelastine (ASTELIN) 137 mcg (0.1 %) nasal spray, Administer 2 sprays into each nostril 2 (two) times a day. Use in each nostril as directed., Disp: 30 mL, Rfl: 6    B-complex with vitamin C tablet, Take 1 tablet by mouth daily. , Disp: , Rfl:     cetirizine 10 mg tablet, Take 10 mg by mouth daily., Disp: , Rfl:     cholecalciferol, vitamin D3, (VITAMIN D3) 2,000 unit tablet, Take 2,000 IU by mouth daily., Disp: , Rfl:     EPINEPHrine (EPIPEN) 0.3 mg/0.3 mL injection syringe, Inject 0.3 mL (0.3 mg total) into the thigh as needed for anaphylaxis., Disp: 1 Syringe, Rfl: 0    escitalopram 10 mg tablet, Take 10 mg by mouth daily., Disp: , Rfl:     fluticasone (FLONASE) 50 mcg/actuation nasal spray, Administer 2 sprays into each nostril daily as needed.  , Disp: , Rfl:     MULTIVITAMIN ORAL, Take 1 tablet by mouth daily.  , Disp: , Rfl:     omeprazole (PriLOSEC) 20 mg capsule, Take 20 mg by mouth every other day. Take 30 minutes prior to a meal. , Disp: , Rfl:     amoxicillin 500 mg tablet, Take 4 tablets (2,000 mg total) by mouth once as needed (one hour prior to dental procedure.). (Patient not taking: No sig reported), Disp: 4 tablet, Rfl: 5  Immunization History   Administered Date(s) Administered    INFLUENZA VACCINE QUAD ADJUVANTED 65 and OLDER 10/09/2020, 10/13/2021    Influenza Split Preservative Free ID 12/19/2012    Influenza TIV (IM)  "11/03/2010    Influenza Vaccine 65 And Older Preservative Free 10/16/2019    Influenza Vaccine Quadrivalent 3 Yr And Older 11/11/2015, 09/25/2018    Influenza Vaccine Quadrivalent Preservative Free 6-35 Months 09/27/2013, 11/21/2014, 10/10/2016    Influenza, Unspecified 11/21/2014, 10/10/2016, 11/06/2017    Pneumococcal Conjugate 13-Valent 11/30/2018    Pneumococcal Polysaccharide 10/09/2020    SARS-COV-2 (COVID-19) VACCINE, MODERNA 02/25/2021, 03/25/2021    Tdap 03/03/2011, 08/06/2021    Zoster 11/21/2014    Zoster Vaccine Recombinant Adjuvanted (Shingrix) 12/11/2020, 03/11/2021     Immunization status: up to date and documented.      Review of Systems:  A Full 14  point review of systems was obtained and is negative or otherwise stated in the HPI.      Physical Examination:  This is a well-developed well-nourished 66 y.o.year old male in no acute distress.  Vital Signs:   Visit Vitals  /74   Pulse 80   Resp 18   Ht 1.778 m (5' 10\")   Wt 127 kg (280 lb)   SpO2 97%   BMI 40.18 kg/m²     HEENT:  Normocephalic, atraumatic.  PERRLA. There is no nasal mucosal inflammation or pharyngitis.  Significant crowding of the posterior oropharynx with diffuse macroglossia greatest at the base.  Eyes:  Sclera anicteric, conjunctiva pink, pupils equal and reactive to light  Neck: no adenopathy, no JVD  Chest:  Lungs clear to auscultation and percussion bilaterally, no crackles, rubs, rhonchi or wheezing.  Cor:  RR, normal S1 and S2, no murmurs, gallops or rubs.   There is no accentuated P2 or right ventricular heave.  Abd: Soft Nontender, nondistended, normal bowel sounds.  There is no hepatosplenomegaly.  Extremities: no clubbing, cyanosis or edema.  Skin: no rash, jaundice or petechiae.  The skin is otherwise intact.  Neuro: alert and oriented with no focal deficits.  Lymph nodes:There is no palpable peripheral  lymphadenopathy.  Joints: no deformities, no warmth or erythema.  Psych: normal affect, good eye " contact.      Diagnostic Data:      Labs:  I have personally reviewed all pertinent patient laboratory results.  ABG Results    No lab values to display.       CBC Results       11/01/21 10/20/20 02/07/20     1016 0924 0650    WBC 4.94 4.7 5.99    RBC 4.88 4.99 4.43    HGB 14.1 14.4 12.7    HCT 44.2 44.2 38.8    MCV 90.6 89 87.6    MCH 28.9 28.9 28.7    MCHC 31.9 32.6 32.7     277 362        BMP Results       11/01/21 10/20/20 02/07/20     1016 0924 0650     140 138    K 4.8 4.9 4.4    Cl 106 106 103    CO2 25 22 27    Glucose 93 102 99    BUN 14 17 11    Creatinine 1.0 0.88 0.9    Calcium 9.0 -- 9.4    Anion Gap 11 -- 8    EGFR >60.0 89 >60.0      103             Imaging:  I have independently reviewed all available pertinent imaging.      Assessment:     #1.  History of moderate to severe obstructive sleep apnea diagnosed in 2017 with consistent symptomatology and risk factors that include crowding of the posterior oropharynx with increased nuchal and truncal body mass.  The application of CPAP was sufficient to eliminate apnea and hypoxemia and restore sleep to virtually normal architecture.    He has been using his CPAP for over 15 years which has now become dysfunctional and to be reimbursed for new equipment a new sleep study was required by payers.  However, quite surprisingly repeat study showed no evidence of significant pathologic sleep which is completely inconsistent with his clinical findings and history.  In view of this, I have recommended that we repeat a sleep study, continue with CPAP as tolerated and proceed with buying new CPAP out-of-pocket.    #2.  Allergic rhinitis/postnasal drip; this has been well controlled but he has particular issues with fall allergies and I have recommended an adjustment in his intranasal steroid regimen with the addition of topical  H1 antagonist.    Plan:  #1.  At this point I would recommend repeat sleep evaluation with further recommendations based on  these findings.  #2.  If the sleep study is negative then I would recommend he buy a CPAP out-of-pocket and I will be happy to set it up appropriately.  #3.  In the meantime continue the present plan.  #4.  Continue optimizing sleep hygiene, maintaining optimal body mass index with  judicious but definite weight loss, avoiding sleeping supine, limiting alcohol and sedatives, limiting caffeine to before noon and absolutely not operating machinery especially a motor vehicle if feeling fatigued.  #5.  I have recommended using Flonase 2 squirts in each nostril at bedtime and twice daily as needed.  #6.  In addition I have recommended using Astelin 2 squirts in each nostril 2 or 3 times per day as needed through heavy allergy season.  #7.  Continue with appropriate vaccinations.  #8.  Otherwise continue the present plan for now.    I have discussed my findings concerns and recommendations at length with Deric.  All questions were asked and answered. Evaluation of supplies and equipment were performed.  Education and instructions have been provided.  Follow-up has been arranged.  I will keep you apprised of my findings.  Please let me know if you have any questions.  Many thanks. Best regards.    Sincerely,    Hugo Pozo MD    9/28/2022    This letter was generated using speech recognition software.  Please excuse any typographical errors.

## 2022-10-10 RX ORDER — EPINEPHRINE 0.3 MG/.3ML
1 INJECTION SUBCUTANEOUS AS NEEDED
Qty: 2 EACH | Refills: 1 | Status: SHIPPED | OUTPATIENT
Start: 2022-10-10

## 2022-10-19 ENCOUNTER — OFFICE VISIT (OUTPATIENT)
Dept: PRIMARY CARE | Facility: CLINIC | Age: 67
End: 2022-10-19
Payer: MEDICARE

## 2022-10-19 VITALS
HEART RATE: 81 BPM | BODY MASS INDEX: 39.79 KG/M2 | SYSTOLIC BLOOD PRESSURE: 120 MMHG | TEMPERATURE: 97.7 F | WEIGHT: 284.2 LBS | RESPIRATION RATE: 16 BRPM | OXYGEN SATURATION: 98 % | DIASTOLIC BLOOD PRESSURE: 80 MMHG | HEIGHT: 71 IN

## 2022-10-19 DIAGNOSIS — E78.49 OTHER HYPERLIPIDEMIA: Chronic | ICD-10-CM

## 2022-10-19 DIAGNOSIS — R35.0 URINARY FREQUENCY: ICD-10-CM

## 2022-10-19 DIAGNOSIS — K64.9 HEMORRHOIDS, UNSPECIFIED HEMORRHOID TYPE: ICD-10-CM

## 2022-10-19 DIAGNOSIS — Z00.00 MEDICARE ANNUAL WELLNESS VISIT, SUBSEQUENT: Primary | ICD-10-CM

## 2022-10-19 PROCEDURE — G0439 PPPS, SUBSEQ VISIT: HCPCS | Performed by: FAMILY MEDICINE

## 2022-10-19 RX ORDER — ESCITALOPRAM OXALATE 20 MG/1
20 TABLET ORAL DAILY
COMMUNITY
Start: 2022-10-19 | End: 2023-06-08 | Stop reason: SDUPTHER

## 2022-10-19 RX ORDER — NAPROXEN SODIUM 220 MG/1
81 TABLET, FILM COATED ORAL DAILY
Qty: 60 TABLET | Refills: 0 | COMMUNITY
Start: 2022-10-19

## 2022-10-19 SDOH — HEALTH STABILITY: PHYSICAL HEALTH: ON AVERAGE, HOW MANY DAYS PER WEEK DO YOU ENGAGE IN MODERATE TO STRENUOUS EXERCISE (LIKE A BRISK WALK)?: 6 DAYS

## 2022-10-19 SDOH — HEALTH STABILITY: PHYSICAL HEALTH: ON AVERAGE, HOW MANY MINUTES DO YOU ENGAGE IN EXERCISE AT THIS LEVEL?: 60 MIN

## 2022-10-19 SDOH — ECONOMIC STABILITY: FOOD INSECURITY: WITHIN THE PAST 12 MONTHS, THE FOOD YOU BOUGHT JUST DIDN'T LAST AND YOU DIDN'T HAVE MONEY TO GET MORE.: NEVER TRUE

## 2022-10-19 SDOH — ECONOMIC STABILITY: FOOD INSECURITY: WITHIN THE PAST 12 MONTHS, YOU WORRIED THAT YOUR FOOD WOULD RUN OUT BEFORE YOU GOT MONEY TO BUY MORE.: NEVER TRUE

## 2022-10-19 ASSESSMENT — ENCOUNTER SYMPTOMS
SORE THROAT: 0
FATIGUE: 0
NERVOUS/ANXIOUS: 0
SHORTNESS OF BREATH: 0
FLANK PAIN: 0
CONSTIPATION: 0
SLEEP DISTURBANCE: 0
EYE PAIN: 0
DYSPHORIC MOOD: 0
MYALGIAS: 0
BACK PAIN: 0
NECK PAIN: 0
LIGHT-HEADEDNESS: 0
UNEXPECTED WEIGHT CHANGE: 0
ABDOMINAL PAIN: 0
SPEECH DIFFICULTY: 0
RHINORRHEA: 0
NECK STIFFNESS: 0
EYE DISCHARGE: 0
EYE REDNESS: 0
DIFFICULTY URINATING: 0
NUMBNESS: 0
BRUISES/BLEEDS EASILY: 0
NAUSEA: 0
DIARRHEA: 0
HEMATURIA: 0
CHEST TIGHTNESS: 0
HEADACHES: 0
VOMITING: 0
ARTHRALGIAS: 0
FEVER: 0
COUGH: 0
DIZZINESS: 0
ROS GI COMMENTS: HEMORRHOIDS
SINUS PRESSURE: 0
APPETITE CHANGE: 0
CHILLS: 0
DYSURIA: 0
WEAKNESS: 0
WHEEZING: 0
BLOOD IN STOOL: 0
SINUS PAIN: 0
CONFUSION: 0
FREQUENCY: 0
TROUBLE SWALLOWING: 0

## 2022-10-19 ASSESSMENT — MINI COG
COMPLETED: YES
TOTAL SCORE: 5

## 2022-10-19 ASSESSMENT — PATIENT HEALTH QUESTIONNAIRE - PHQ9: SUM OF ALL RESPONSES TO PHQ9 QUESTIONS 1 & 2: 0

## 2022-10-19 ASSESSMENT — SOCIAL DETERMINANTS OF HEALTH (SDOH): HOW HARD IS IT FOR YOU TO PAY FOR THE VERY BASICS LIKE FOOD, HOUSING, MEDICAL CARE, AND HEATING?: NOT HARD AT ALL

## 2022-10-19 NOTE — PATIENT INSTRUCTIONS
Men's Personalized Prevention Plan Services (PPPS)         Preventive Services Checklist (Assumes Average Risk Unless Otherwise Noted)  Preventive Service Target Population and Frequency Last Done Date Due   Abdominal Aortic Aneurysm Screening Any 1 risk factor: 1) family history of AAA or 2) 65-74yo and smoked 100+ cigarettes in a lifetime (covered once)   Not needed    Alcohol Misuse Screening As necessary for those at risk (covered annually) 10/19/2022 10/2023   Cholesterol Screening As necessary >=36yo; 20-36yo if increased risk coronary artery disease (covered every 5 years)  11/01/2021 Ordered    Colorectal Cancer Screening >=49yo: Colonoscopy every 10 years, FIT annually or Cologuard every 3 years (up to 86yo for Cologuard) 09/14/2018 09/2023   Diabetes Screening Any 1 risk factor: hypertension, dyslipidemia, obesity, high glucose; or Any 2 risk factors: >=66 yo, overweight, family history diabetes (covered every 6 months) 11/01/2021 Ordered    Glaucoma Screening Any 1 risk factor: 1) diabetes, 2) family history glaucoma, 3)  >=49yo, 4)  American >=64yo (covered annually) 03/2022 Due at this time    Hepatitis C Screening Any 1 risk factor: 1) born between [id number], 2) blood transfusion before 1992, 3) current or past injection drug use (covered once for average risk, annually for high risk) 03/16/2018 Done    Lung Cancer Screening Low-dose chest CT if all 3 risk factors: 1) 55-76yo, 2) smoker or quit within last 15y, 3) >=30 pack years (covered annually)   Not needed    Prostate Cancer Screening 55-69 years old if patient desires test after weighing potential harms and benefits (covered annually) 11/01/2021 Ordered    Sexually Transmitted Diseases (STDs) As necessary chlamydia, gonorrhea, syphilis, hepatitis B (covered annually)  HIV if any 1 risk factor present: 1) <16yo or >64yo and at increased risk or 2) 15-64yo and ask for it (covered annually)    Low Risk    Vaccine: Hepatitis B  "As necessary if medium or high risk (series covered once)   Not needed    Vaccine: Influenza All without contraindications (covered annually.) 09/21/2022 Fall 2023   Vaccine: Pneumococcal Pneumovax + Prevnar (both covered, >=11 months apart) Prevnar   11/30/2018  Pneumovax  10/09/2020  Done    Vaccine: Shingrix (Shingles) >= 51yo without contraindications   (2 doses, 2 months apart) Zostavax   11/21/2014  Shingrix  12/11/2020     TDAP Due every 10 years  08/06/2021 08/2031                                           Men's Personalized Prevention Plan Services (PPPS)                                           Health Risk Factors and Interventions  \"x\" Indicates risk is associated with this patient  Risk Factor             x Overweight Low fat diet, exercise, and weight loss      N/A  Hypertension     N/A  Diabetes     N/A  Fall Risk     N/A  Tobacco Use                  Problem List Items Addressed This Visit       Other hyperlipidemia (Chronic)    Relevant Orders    CBC and Differential    Comprehensive metabolic panel    Lipid panel    TSH 3rd Generation    Medicare annual wellness visit, subsequent - Primary     The patient is currently stable.   Bloodwork was ordered including a complete blood count, complete metabolic profile, thyroid stimulating hormone, PSA,and a lipid profile.   Further recommendations will be provided to the patient based on the results of the tests.   The patient should continue to exercise for 2.5 hours per week.   All the recommend vaccinations are up to date.   The patient should be evaluated by the ophthalmologist every years and dentist every 6 months.   The patient was advised to protect the skin by applying suntan lotion containing an SPF > 30 every 2 hours while in sun or after swimming. Instructed to avoid prolonged direct sun exposure as much as possible.   The patient's body mass index is elevated. The patient was instructed to lose weight through diet and exercise to achieve a body " mass index < 25.   The patient's colonoscopy is up to date.   Advised to consume 1000 mg of calcium daily through the diet. If the patient is unable to consume 1000 mg through the diet, then taking calcium supplements is an acceptable alternative. The patient was informed not to consume more than 500 mg of a calcium supplement at a time.               Other Visit Diagnoses       Urinary frequency        Relevant Orders    PSA    Hemorrhoids, unspecified hemorrhoid type        Relevant Orders    Ambulatory referral to Colorectal Surgery              Health Risk Factors with Personalized Education:  ----------------------------------------------------------------------------------------------------------------------  Stress management:  Understanding Your Stress  Think about how you know when youre stressed.  Think about how your thoughts or behaviors are different when youre stressed.  Think about what triggers stress for you.  Think about how you handle stress, and whether youre making unhealthy choices in response to stress (like smoking, drinking alcohol or overeating).  Managing Stress  Take a break from the stressful situation.  Reduce your stress levels by exercising, talking with family/friends, ensuring adequate sleep.  Consider meditation or yoga.  Make sure you plan time to do things you enjoy.  Let your PCP know if youre having problems limiting your stress.  ----------------------------------------------------------------------------------------------------------------------  Controlling Your Blood Pressure  Maintain a normal weight (body mass index between 18.5 and 24.9).  Eat more fruit, vegetables and low-fat dairy.  Eat less saturated fat and total fat.  Lower your sodium (salt) intake.  Try to stay under 1500 mg per day, but if you cannot get your intake to be that low, at least lower it by 1000 mg.  Stay active.  Try to get at least 90 to 150 minutes of exercise per week.  Try brisk walking,  swimming, bicycling or dancing.  Limit alcohol intake.  When you do consume alcohol, drink no more than 2 drinks per day.  If you have been prescribed medication, take it regularly and exactly as prescribed.  Let your PCP know if you have any problems or questions about your medication.  Check your blood pressure at home or at the store.  Write down your readings and share them with your PCP  ----------------------------------------------------------------------------------------------------------------------  Controlling Your Cholesterol  Reduce the amount of saturated and trans fat in your diet.  Limit intake of red meat.  Consume only low-fat or non-fat/skim dairy.  Limit fried food.  Cook with vegetable oils.  Reduce your intake of sugary foods, sugary drinks and alcohol.  Eat a diet high in fruit, vegetables and whole grains.  Get protein from fish, poultry and a small portion of nuts.  Stay active.  Try to get at least 90 to 150 minutes of exercise per week.  Try brisk walking, swimming, bicycling or dancing.  Maintain a healthy weight by balancing your diet and exercise.  If you have been prescribed medication, take it regularly and exactly as prescribed. Let your PCP know if you have any problems or questions about your medication.  Its important to know your cholesterol numbers.  When recommended by your PCP, get the cholesterol blood test.  ----------------------------------------------------------------------------------------------------------------------  Reducing Your Risk of Falls  Tell your PCP if any of your medications make you feel tired, dizzy, lightheaded or off-balance.  Maintain coordination, flexibility and balance by ensuring regular physical activity.  Limit alcohol intake to 1 drink per day.  Consider avoiding all alcohol intake.  Ensure good vision.  Visit an ophthalmologist or optometrist regularly for vision screening or to make sure your glasses / contact lens prescription is correct.   If you need glasses or contacts, wear them.  When you get new glasses or contacts, take time to get used to them.  Do not wear sunglasses or tinted lenses when indoors.  Ensure good hearing.  Have your hearing checked if you are having trouble hearing, or family and friends think you cannot hear them.  If you need a hearing aid, be sure it fits well and wear it.  Get enough rest.  Ensure about 7-9 hours of sleep every day.  Get up slowly from your bed or chairs.  Do not start walking until you are sure you feel steady.  Wear non-skid, rubber-soled, low-heeled shoes.  Do not walk in socks, or in shoes and slippers with smooth soles.  If your PCP or therapist recommends using a cane or walker, use it regularly.  Make your home safer.  Increase lighting throughout the house, especially at the top and bottom of stairs.  Ensure lighting is easily turned on when getting up in the middle of the night.  Make sure there are two secure rails on all stairs.  Install grab bars in the bathtub / shower and near the toilet.  Consider using a shower chair and / or a hand-held shower.  Spread sand or salt on icy surfaces.  Beware of wet surfaces, which can be icy.  Tell your PCP if you have fallen.

## 2022-10-19 NOTE — ASSESSMENT & PLAN NOTE
· The patient is currently stable.   · Bloodwork was ordered including a complete blood count, complete metabolic profile, thyroid stimulating hormone, PSA,and a lipid profile.   · Further recommendations will be provided to the patient based on the results of the tests.   · The patient should continue to exercise for 2.5 hours per week.   · All the recommend vaccinations are up to date.   · The patient should be evaluated by the ophthalmologist every years and dentist every 6 months.   · The patient was advised to protect the skin by applying suntan lotion containing an SPF > 30 every 2 hours while in sun or after swimming. Instructed to avoid prolonged direct sun exposure as much as possible.   · The patient's body mass index is elevated. The patient was instructed to lose weight through diet and exercise to achieve a body mass index < 25.   · The patient's colonoscopy is up to date.   · Advised to consume 1000 mg of calcium daily through the diet. If the patient is unable to consume 1000 mg through the diet, then taking calcium supplements is an acceptable alternative. The patient was informed not to consume more than 500 mg of a calcium supplement at a time.

## 2022-10-19 NOTE — PROGRESS NOTES
Subjective     Deric Ray is a 67 y.o. male who presents for a subsequent annual wellness visit.     Patient Care Team:  Tom Douglas MD as PCP - General    Comprehensive Medical and Social History  Patient Active Problem List   Diagnosis    Attention deficit disorder (ADD) without hyperactivity    Allergic rhinitis    Polyp of colon    Depression    GERD (gastroesophageal reflux disease)    COURT (obstructive sleep apnea)    Osteoarthritis of knee    Medicare annual wellness visit, subsequent    Carpal tunnel syndrome    Diverticulosis    Other hyperlipidemia    IFG (impaired fasting glucose)     Past Medical History:   Diagnosis Date    Allergic rhinitis     Controlled with zyrtec and Fluticasone nasal spray as needed. Worse in spring and fall.    Attention deficit disorder (ADD) in adult     Managed on Straterra in the past but not effective.    Carpal tunnel syndrome 5/30/2018    Neurologist: Dr. Truong. EMG in 05/2018 with bilateral median neuropathy with right severe and left mild to moderate. Being treated with wrist splints.    Complaint of nasal congestion     Dr Douglas prescribed Augmentin last day 1/16/20    Depression 3/7/2018    Controlled with Escitalopram.     Diverticulosis 9/17/2018    Seen in the sigmoid colon during colonoscopy in 09/2018 and no history of diverticuliitis.     GERD (gastroesophageal reflux disease)     Managed on Omeprazole. Should take medication about 30 minutes prior to meal.  The patient should avoid caffeinated products. The patient should avoid carbonated beverages. The patient was instructed not to eat within 3 hours of bedtime. Pt instructed to avoid fatty meals. Suggested weight reduction.    IFG (impaired fasting glucose) 10/21/2020    Advised to follow a low carbohydrate diet and to reduce BMI < 25.  Advised to exercise for at least 2.5 hours per week.      COURT (obstructive sleep apnea) 3/7/2018    Managed on CPAP      COURT on CPAP     Pulmonologist:  Dr. Pulido. Managed with CPAP at 10 CM H2O    Osteoarthritis of both knees     Orthopedic surgeon: Dr. Cheung. S/P left knee hemiarthroplasty in 2007. X-rays in 12/2014 reveal bilateral small effusions, advanced DJD left knee and mild right knee DJD. MRI of right knee (3/2015) with severe medial compartment osteoarthrosis with subchondral fracture of medial femoral condyle with associated osteochondral fracture posteriorly. Has medial femoral condyle avascular necrosis.    Other hyperlipidemia 10/21/2020    Managed with diet. Requires periodic monitoring of lipids and liver function tests.  Advised to follow low fat diet and to keep BMI < 25.  Advised to exercise for 2.5 hours per week.      Polyp of colon 3/7/2018    Gastroenterologist: Dr. Dunaway. S/P colonoscopy with polypectomy in 2004. Colonoscopy with polypectomy x 7 (5 tubular adenomas and 2 hyperplastic polyps) on 11/5/2014. Colonoscopy in 09/2018 with polypectomy x 5 (Tubular adenoma, sessile serrated adenoma and hyperplastic polyp). Next due in 09/2023.      Past Surgical History:   Procedure Laterality Date    BONE CYST EXCISION Left     Left humeral bone cyst excision    COLONOSCOPY W/ POLYPECTOMY      JOINT REPLACEMENT      RTKR 2014, partial left knee 2008    REVISION TOTAL KNEE ARTHROPLASTY Right 01/2020    Dr. Harman    VARICOCELECTOMY  1985    VASECTOMY       Allergies   Allergen Reactions    Cashew Nut     Hydromorphone      Other reaction(s): Urticaria    Golden Flavor      Other reaction(s):  shock, vasovagal syncope  mangos     Pistachio Nut     Sunflower Seed Angioedema     Current Outpatient Medications   Medication Sig Dispense Refill    aspirin 81 mg chewable tablet Take 1 tablet (81 mg total) by mouth daily. For 4 weeks to prevent blood clots. 60 tablet 0    azelastine (ASTELIN) 137 mcg (0.1 %) nasal spray Administer 2 sprays into each nostril 2 (two) times a day. Use in each nostril as directed. 30 mL 6     B-complex with vitamin C tablet Take 1 tablet by mouth daily.       cetirizine 10 mg tablet Take 10 mg by mouth daily.      EPINEPHrine (EPIPEN) 0.3 mg/0.3 mL injection syringe Inject 0.3 mL (0.3 mg total) into the thigh as needed for anaphylaxis. 2 each 1    escitalopram (LEXAPRO) 20 mg tablet Take 1 tablet (20 mg total) by mouth daily.      MULTIVITAMIN ORAL Take 1 tablet by mouth daily.        omeprazole (PriLOSEC) 20 mg capsule Take 20 mg by mouth every other day. Take 30 minutes prior to a meal.        No current facility-administered medications for this visit.     Social History     Tobacco Use    Smoking status: Never    Smokeless tobacco: Never   Vaping Use    Vaping Use: Never used   Substance Use Topics    Alcohol use: Yes     Alcohol/week: 6.0 - 9.0 standard drinks     Types: 6 - 9 Shots of liquor per week    Drug use: No     Family History   Problem Relation Age of Onset    Arthritis Biological Mother     Valvular heart disease Biological Mother     COPD Biological Father     Arthritis Biological Sister     Arthritis Biological Brother     Schizophrenia Biological Brother     No Known Problems Biological Daughter     No Known Problems Biological Daughter     No Known Problems Biological Sister     No Known Problems Biological Son      Review of Systems   Constitutional: Negative for appetite change, chills, fatigue, fever and unexpected weight change.   HENT: Positive for congestion. Negative for ear pain, hearing loss, nosebleeds, postnasal drip, rhinorrhea, sinus pressure, sinus pain, sneezing, sore throat, tinnitus and trouble swallowing.    Eyes: Negative for pain, discharge, redness and visual disturbance.   Respiratory: Negative for cough, chest tightness, shortness of breath and wheezing.    Cardiovascular: Negative for chest pain and leg swelling.   Gastrointestinal: Negative for abdominal pain, blood in stool, constipation, diarrhea, nausea and vomiting.        Hemorrhoids  "  Endocrine: Negative for cold intolerance and heat intolerance.   Genitourinary: Negative for decreased urine volume, difficulty urinating, dysuria, flank pain, frequency, hematuria and urgency.   Musculoskeletal: Negative for arthralgias, back pain, gait problem, myalgias, neck pain and neck stiffness.   Skin: Negative for rash.   Allergic/Immunologic: Negative for environmental allergies.   Neurological: Negative for dizziness, syncope, speech difficulty, weakness, light-headedness, numbness and headaches.   Hematological: Does not bruise/bleed easily.   Psychiatric/Behavioral: Negative for behavioral problems, confusion, dysphoric mood and sleep disturbance. The patient is not nervous/anxious.        Objective   Vitals  Vitals:    10/19/22 1251   BP: 120/80   Pulse: 81   Resp: 16   Temp: 36.5 °C (97.7 °F)   TempSrc: Oral   SpO2: 98%   Weight: 129 kg (284 lb 3.2 oz)  Comment: with shoes   Height: 1.791 m (5' 10.5\")  Comment: with shoes     Body mass index is 40.2 kg/m².    Wt Readings from Last 3 Encounters:   10/19/22 129 kg (284 lb 3.2 oz)   09/28/22 127 kg (280 lb)   03/17/22 127 kg (280 lb)     Physical Exam  Constitutional:       General: He is not in acute distress.     Appearance: Normal appearance. He is well-developed. He is not ill-appearing, toxic-appearing or diaphoretic.   HENT:      Head: Normocephalic and atraumatic.      Right Ear: Tympanic membrane, ear canal and external ear normal.      Left Ear: Tympanic membrane, ear canal and external ear normal.      Nose: No congestion or rhinorrhea.      Mouth/Throat:      Pharynx: No oropharyngeal exudate or posterior oropharyngeal erythema.   Eyes:      General: Lids are normal.      Conjunctiva/sclera: Conjunctivae normal.      Pupils: Pupils are equal, round, and reactive to light.   Neck:      Thyroid: No thyromegaly.      Vascular: No carotid bruit.   Cardiovascular:      Rate and Rhythm: Normal rate and regular rhythm.      Pulses:           " Popliteal pulses are 2+ on the right side and 2+ on the left side.        Dorsalis pedis pulses are 2+ on the right side and 2+ on the left side.      Heart sounds: Normal heart sounds. No murmur heard.    No gallop.   Pulmonary:      Effort: Pulmonary effort is normal. No tachypnea, accessory muscle usage or respiratory distress.      Breath sounds: Normal breath sounds. No decreased breath sounds, wheezing, rhonchi or rales.   Abdominal:      General: Bowel sounds are normal. There is no distension.      Palpations: Abdomen is soft.      Tenderness: There is no abdominal tenderness. There is no guarding or rebound.      Hernia: No hernia is present.   Genitourinary:     Prostate: Normal.   Musculoskeletal:      Cervical back: Full passive range of motion without pain, normal range of motion and neck supple.      Right lower leg: No edema.      Left lower leg: No edema.   Lymphadenopathy:      Cervical: No cervical adenopathy.   Skin:     General: Skin is warm and dry.      Findings: No rash.   Neurological:      Mental Status: He is alert.   Psychiatric:         Mood and Affect: Mood normal. Mood is not anxious or depressed.         Speech: Speech normal.         Behavior: Behavior normal. Behavior is cooperative.         Thought Content: Thought content normal.         Judgment: Judgment normal.         Advanced Care Plan  Does patient have advance directive?: Yes       Patient has Advance Directive: Advance Directive is NOT in chart, requested to bring in                             PHQ  Will the patient answer the depression questions?: Yes   Little interest or pleasure in doing things: Not at all   Feeling down, depressed, or hopeless: Not at all   Depression Risk: 0                                             Mini Cog  Completed: Yes  Score: 5  Result: Negative      Get Up and Go  Result: Pass    STEADI Falls Risk  One or more falls in the last year: No           Has trouble stepping up onto a curb: No    Advised to use a cane or walker to get around safely: No   Often has to rush to the toilet: No   Feels unsteady when walking: No   Has lost some feeling in feet: No   Often feels sad or depressed: No   Steadies self on furniture while walking at home: No   Takes medication that makes him/her feel lightheaded or more tired than usual: No   Worried about falling: No   Takes medicine to sleep or improve mood: Yes   Needs to push with hands when rising from a chair: No   Falls screen completed: Yes     Immunization History   Administered Date(s) Administered    INFLUENZA VACCINE QUAD ADJUVANTED 65 and OLDER 10/09/2020, 10/13/2021    Influenza Split Preservative Free ID 12/19/2012    Influenza TIV (IM) 11/03/2010    Influenza Vaccine 65 And Older Preservative Free 10/16/2019    Influenza Vaccine High Dose 65 And Older 09/21/2022    Influenza Vaccine Quadrivalent 3 Yr And Older 11/11/2015, 09/25/2018    Influenza Vaccine Quadrivalent Preservative Free 6-35 Months 09/27/2013, 11/21/2014, 10/10/2016    Influenza, Unspecified 11/21/2014, 10/10/2016, 11/06/2017    Pneumococcal Conjugate 13-Valent 11/30/2018    Pneumococcal Polysaccharide 10/09/2020    SARS-COV-2 (COVID-19) VACCINE PFIZER BIVALENT BOOSTER 12 years and older ([place]) 09/21/2022    SARS-COV-2 (COVID-19) VACCINE, MODERNA 02/25/2021, 03/25/2021, 11/10/2021    Tdap 03/03/2011, 08/06/2021    Zoster 11/21/2014    Zoster Vaccine Recombinant Adjuvanted (Shingrix) 12/11/2020, 03/11/2021       Health Maintenance Topics with due status: Not Due       Topic Last Completion Date    Colorectal Cancer Screening 09/14/2018    DTaP, Tdap, and Td Vaccines 08/06/2021    Medicare Annual Wellness Visit 10/19/2022     Health Maintenance Topics with due status: Completed       Topic Last Completion Date    Hepatitis C Screening 03/06/2018    Pneumococcal (65 years and older) 10/09/2020    Zoster Vaccine 03/11/2021    Influenza Vaccine 09/21/2022    COVID-19 Vaccine  09/21/2022    Annual Falls Risk Screening 10/19/2022     Health Maintenance Topics with due status: Aged Out       Topic Date Due    Meningococcal ACWY Aged Out    HIB Vaccines Aged Out    IPV Vaccines Aged Out    HPV Vaccines Aged Out       Lab Results   Component Value Date    WBC 4.94 11/01/2021    WBC 4.7 10/20/2020    WBC 5.99 02/07/2020    HGB 14.1 11/01/2021    HGB 14.4 10/20/2020    HGB 12.7 (L) 02/07/2020    HCT 44.2 11/01/2021    HCT 44.2 10/20/2020    HCT 38.8 (L) 02/07/2020    MCV 90.6 11/01/2021    MCV 89 10/20/2020    MCV 87.6 02/07/2020     11/01/2021     10/20/2020     (H) 02/07/2020       Lab Results   Component Value Date    GLUCOSE 93 11/01/2021    GLUCOSE 102 (H) 10/20/2020    GLUCOSE 99 02/07/2020    CALCIUM 9.0 11/01/2021    CALCIUM 9.4 02/07/2020    CALCIUM 8.7 (L) 01/31/2020     11/01/2021     10/20/2020     02/07/2020    K 4.8 11/01/2021    K 4.9 10/20/2020    K 4.4 02/07/2020    CO2 25 11/01/2021    CO2 22 10/20/2020    CO2 27 02/07/2020     11/01/2021     10/20/2020     02/07/2020    BUN 14 11/01/2021    BUN 17 10/20/2020    BUN 11 02/07/2020    CREATININE 1.0 11/01/2021    CREATININE 0.88 10/20/2020    CREATININE 0.9 02/07/2020       Lab Results   Component Value Date    ALT 18 11/01/2021    ALT 18 10/20/2020    ALT 22 01/31/2020    AST 18 11/01/2021    AST 17 10/20/2020    AST 22 01/31/2020    ALKPHOS 57 11/01/2021    ALKPHOS 78 10/20/2020    ALKPHOS 44 01/31/2020    BILITOT 0.8 11/01/2021    BILITOT 0.3 10/20/2020    BILITOT 0.8 01/31/2020       Lab Results   Component Value Date    CHOL 183 11/01/2021    CHOL 203 (H) 10/20/2020    CHOL 198 03/06/2018     Lab Results   Component Value Date    HDL 62 11/01/2021    HDL 74 10/20/2020    HDL 62 03/06/2018     Lab Results   Component Value Date    LDLCALC 111 (H) 11/01/2021    LDLCALC 119 (H) 10/20/2020    LDLCALC 115 (H) 03/06/2018     Lab Results   Component Value Date    TRIG 49  11/01/2021    TRIG 52 10/20/2020    TRIG 103 03/06/2018         Lab Results   Component Value Date    PSA 0.59 11/01/2021    PSA 0.7 10/20/2020    PSA 0.8 03/06/2018       Lab Results   Component Value Date    TSH 0.55 11/01/2021    TSH 0.876 10/20/2020    TSH 1.760 03/06/2018       Lab Results   Component Value Date    HEPCAB <0.1 03/06/2018           Hearing and Vision Screening  No results found.  See HRA for relevant hearing screening response.    Assessment/Plan   Diagnoses and all orders for this visit:    Medicare annual wellness visit, subsequent (Primary)  Assessment & Plan:  · The patient is currently stable.   · Bloodwork was ordered including a complete blood count, complete metabolic profile, thyroid stimulating hormone, PSA,and a lipid profile.   · Further recommendations will be provided to the patient based on the results of the tests.   · The patient should continue to exercise for 2.5 hours per week.   · All the recommend vaccinations are up to date.   · The patient should be evaluated by the ophthalmologist every years and dentist every 6 months.   · The patient was advised to protect the skin by applying suntan lotion containing an SPF > 30 every 2 hours while in sun or after swimming. Instructed to avoid prolonged direct sun exposure as much as possible.   · The patient's body mass index is elevated. The patient was instructed to lose weight through diet and exercise to achieve a body mass index < 25.   · The patient's colonoscopy is up to date.   · Advised to consume 1000 mg of calcium daily through the diet. If the patient is unable to consume 1000 mg through the diet, then taking calcium supplements is an acceptable alternative. The patient was informed not to consume more than 500 mg of a calcium supplement at a time.           Other hyperlipidemia  -     CBC and Differential; Future  -     Comprehensive metabolic panel; Future  -     Lipid panel; Future  -     TSH 3rd Generation;  Future    Urinary frequency  -     PSA; Future    Hemorrhoids, unspecified hemorrhoid type  -     Ambulatory referral to Colorectal Surgery; Future      See Patient Instructions (the written plan) which was given to the patient for PPPS and health risk factors with interventions.

## 2022-10-25 ENCOUNTER — APPOINTMENT (OUTPATIENT)
Dept: LAB | Facility: CLINIC | Age: 67
End: 2022-10-25
Attending: FAMILY MEDICINE
Payer: MEDICARE

## 2022-10-25 DIAGNOSIS — R35.0 URINARY FREQUENCY: ICD-10-CM

## 2022-10-25 DIAGNOSIS — E78.49 OTHER HYPERLIPIDEMIA: Chronic | ICD-10-CM

## 2022-10-25 LAB
ALBUMIN SERPL-MCNC: 3.8 G/DL (ref 3.4–5)
ALP SERPL-CCNC: 58 IU/L (ref 35–126)
ALT SERPL-CCNC: 20 IU/L (ref 16–63)
ANION GAP SERPL CALC-SCNC: 10 MEQ/L (ref 3–15)
AST SERPL-CCNC: 23 IU/L (ref 15–41)
BASOPHILS # BLD: 0.02 K/UL (ref 0.01–0.1)
BASOPHILS NFR BLD: 0.4 %
BILIRUB SERPL-MCNC: 0.6 MG/DL (ref 0.3–1.2)
BUN SERPL-MCNC: 19 MG/DL (ref 8–20)
CALCIUM SERPL-MCNC: 9.1 MG/DL (ref 8.9–10.3)
CHLORIDE SERPL-SCNC: 105 MEQ/L (ref 98–109)
CHOLEST SERPL-MCNC: 191 MG/DL
CO2 SERPL-SCNC: 24 MEQ/L (ref 22–32)
CREAT SERPL-MCNC: 1 MG/DL (ref 0.8–1.3)
DIFFERENTIAL METHOD BLD: NORMAL
EOSINOPHIL # BLD: 0.28 K/UL (ref 0.04–0.54)
EOSINOPHIL NFR BLD: 5.3 %
ERYTHROCYTE [DISTWIDTH] IN BLOOD BY AUTOMATED COUNT: 13.3 % (ref 11.6–14.4)
GFR SERPL CREATININE-BSD FRML MDRD: >60 ML/MIN/1.73M*2
GLUCOSE SERPL-MCNC: 97 MG/DL (ref 70–99)
HCT VFR BLDCO AUTO: 44 % (ref 40.1–51)
HDLC SERPL-MCNC: 64 MG/DL
HDLC SERPL: 3 {RATIO}
HGB BLD-MCNC: 14.4 G/DL (ref 13.7–17.5)
IMM GRANULOCYTES # BLD AUTO: 0.02 K/UL (ref 0–0.08)
IMM GRANULOCYTES NFR BLD AUTO: 0.4 %
LDLC SERPL CALC-MCNC: 115 MG/DL
LYMPHOCYTES # BLD: 1.42 K/UL (ref 1.2–3.5)
LYMPHOCYTES NFR BLD: 26.9 %
MCH RBC QN AUTO: 29.2 PG (ref 28–33.2)
MCHC RBC AUTO-ENTMCNC: 32.7 G/DL (ref 32.2–36.5)
MCV RBC AUTO: 89.2 FL (ref 83–98)
MONOCYTES # BLD: 0.64 K/UL (ref 0.3–1)
MONOCYTES NFR BLD: 12.1 %
NEUTROPHILS # BLD: 2.89 K/UL (ref 1.7–7)
NEUTS SEG NFR BLD: 54.9 %
NONHDLC SERPL-MCNC: 127 MG/DL
NRBC BLD-RTO: 0 %
PDW BLD AUTO: 10.6 FL (ref 9.4–12.4)
PLATELET # BLD AUTO: 239 K/UL (ref 150–350)
POTASSIUM SERPL-SCNC: 4.5 MEQ/L (ref 3.6–5.1)
PROT SERPL-MCNC: 6.2 G/DL (ref 6–8.2)
PSA SERPL-MCNC: 0.47 NG/ML
RBC # BLD AUTO: 4.93 M/UL (ref 4.5–5.8)
SODIUM SERPL-SCNC: 139 MEQ/L (ref 136–144)
TRIGL SERPL-MCNC: 58 MG/DL (ref 30–149)
TSH SERPL DL<=0.05 MIU/L-ACNC: 1.31 MIU/L (ref 0.34–5.6)
WBC # BLD AUTO: 5.27 K/UL (ref 3.8–10.5)

## 2022-10-25 PROCEDURE — 36415 COLL VENOUS BLD VENIPUNCTURE: CPT

## 2022-10-25 PROCEDURE — 80061 LIPID PANEL: CPT

## 2022-10-25 PROCEDURE — 80053 COMPREHEN METABOLIC PANEL: CPT

## 2022-10-25 PROCEDURE — 84153 ASSAY OF PSA TOTAL: CPT

## 2022-10-25 PROCEDURE — 85025 COMPLETE CBC W/AUTO DIFF WBC: CPT

## 2022-10-25 PROCEDURE — 84443 ASSAY THYROID STIM HORMONE: CPT

## 2022-10-26 ENCOUNTER — DOCUMENTATION (OUTPATIENT)
Dept: PRIMARY CARE | Facility: CLINIC | Age: 67
End: 2022-10-26
Payer: MEDICARE

## 2022-10-26 ENCOUNTER — TELEPHONE (OUTPATIENT)
Dept: SLEEP MEDICINE | Facility: CLINIC | Age: 67
End: 2022-10-26
Payer: MEDICARE

## 2022-10-26 NOTE — RESULT ENCOUNTER NOTE
Patient notified of blood test results.  All tests were stable.  Lipids show an LDL of 115.  Recommend low-fat diet.  And weight loss through diet and exercise.  No other new recommendations advised at this time.

## 2022-10-27 RX ORDER — AMOXICILLIN 500 MG/1
TABLET, FILM COATED ORAL
Qty: 4 TABLET | Refills: 1 | Status: SHIPPED | OUTPATIENT
Start: 2022-10-27

## 2022-11-16 ENCOUNTER — HOSPITAL ENCOUNTER (OUTPATIENT)
Dept: SLEEP MEDICINE | Facility: CLINIC | Age: 67
Discharge: HOME | End: 2022-11-16
Attending: INTERNAL MEDICINE
Payer: MEDICARE

## 2022-11-16 PROCEDURE — G0399 HOME SLEEP TEST/TYPE 3 PORTA: HCPCS

## 2022-12-05 ENCOUNTER — TELEPHONE (OUTPATIENT)
Dept: PULMONOLOGY | Facility: CLINIC | Age: 67
End: 2022-12-05
Payer: MEDICARE

## 2023-01-03 RX ORDER — SCOPOLAMINE 1 MG/3D
1 PATCH, EXTENDED RELEASE TRANSDERMAL
Qty: 5 PATCH | Refills: 0 | Status: SHIPPED | OUTPATIENT
Start: 2023-01-03 | End: 2023-10-23 | Stop reason: ALTCHOICE

## 2023-01-17 ENCOUNTER — OFFICE VISIT (OUTPATIENT)
Dept: PRIMARY CARE | Facility: CLINIC | Age: 68
End: 2023-01-17
Payer: MEDICARE

## 2023-01-17 VITALS
RESPIRATION RATE: 16 BRPM | TEMPERATURE: 97.9 F | HEIGHT: 71 IN | SYSTOLIC BLOOD PRESSURE: 118 MMHG | WEIGHT: 288.2 LBS | OXYGEN SATURATION: 97 % | DIASTOLIC BLOOD PRESSURE: 78 MMHG | HEART RATE: 80 BPM | BODY MASS INDEX: 40.35 KG/M2

## 2023-01-17 DIAGNOSIS — J01.90 ACUTE NON-RECURRENT SINUSITIS, UNSPECIFIED LOCATION: Primary | ICD-10-CM

## 2023-01-17 DIAGNOSIS — E66.01 OBESITY, MORBID (CMS/HCC): ICD-10-CM

## 2023-01-17 PROCEDURE — 99213 OFFICE O/P EST LOW 20 MIN: CPT | Performed by: FAMILY MEDICINE

## 2023-01-17 RX ORDER — AMOXICILLIN AND CLAVULANATE POTASSIUM 875; 125 MG/1; MG/1
1 TABLET, FILM COATED ORAL 2 TIMES DAILY
Qty: 14 TABLET | Refills: 0 | Status: SHIPPED | OUTPATIENT
Start: 2023-01-17 | End: 2023-01-24

## 2023-01-17 ASSESSMENT — ENCOUNTER SYMPTOMS
RHINORRHEA: 1
SORE THROAT: 1
NECK STIFFNESS: 0
WHEEZING: 0
CHILLS: 0
CONFUSION: 0
DIARRHEA: 0
FATIGUE: 0
SINUS PRESSURE: 0
MYALGIAS: 0
ARTHRALGIAS: 0
ABDOMINAL PAIN: 0
CHEST TIGHTNESS: 0
SINUS PAIN: 0
NAUSEA: 0
VOMITING: 0
SHORTNESS OF BREATH: 0
EYE REDNESS: 0
COUGH: 1
FEVER: 0

## 2023-01-17 NOTE — ASSESSMENT & PLAN NOTE
You have been diagnosed with an acute sinus infection.  Antibiotics are indicated at this time.  Treat with Augmentin 875 mg one tablet 2 times per day for 7 days. Please finish all of the medication in order to fully eradicate the infection.  Treat with Acetaminophen 1000 mg 3 times per day as needed for fever, headache, or sore throat.  Drink plenty of fluids: at least 8 large glasses of fluid a day. Good fluid choices are water, fruit juices high in Vitamin C, tea, gelatin, or broths and soups.   Take Mucinex over the counter as directed on package.  Use Ocean nasal spray as often as needed.  Advised to use live active cultured yogurt while on antibiotics to avoid antibiotic associated diarrhea.  Advised to eat foods that are more binding while on antibiotics to avoid antibiotic associated diarrhea. Eat bananas, white rice, applesauce, and white breads.  Advised to call if not resolving or if worsening.

## 2023-01-17 NOTE — PROGRESS NOTES
Tom Douglas MD    [facility]  [address], [address]  [place] [address]  Phone: [phone]  Fax: [phone]     History of Present Illness     Subjective     Patient ID: Titus Ray is a 67 y.o. male.    URI   This is a new problem. Episode onset: 2 weeks ago. The problem has been gradually worsening. There has been no fever. Associated symptoms include congestion, coughing, rhinorrhea and a sore throat. Pertinent negatives include no abdominal pain, chest pain, diarrhea, ear pain, nausea, rash, sinus pain, sneezing, vomiting or wheezing. Associated symptoms comments: Ear clogging bilateral, purulent sputum and rhinorrhea, no shortness of breath, fatigue. Treatments tried: Mucinex and Tussin  The treatment provided mild relief.        Past Medical/Surgical/Family/Social History       The following have been reviewed and updated as appropriate in this visit:   Allergies  Meds  Problems         Past Medical History:   Diagnosis Date   • Allergic rhinitis     Controlled with zyrtec and Fluticasone nasal spray as needed. Worse in spring and fall.   • Attention deficit disorder (ADD) in adult     Managed on Straterra in the past but not effective.   • Carpal tunnel syndrome 05/30/2018    Neurologist: Dr. Truong. EMG in 05/2018 with bilateral median neuropathy with right severe and left mild to moderate. Being treated with wrist splints.   • Complaint of nasal congestion     Dr Douglas prescribed Augmentin last day 1/16/20   • COVID-19 11/2022   • Depression 03/07/2018    Controlled with Escitalopram.    • Diverticulosis 09/17/2018    Seen in the sigmoid colon during colonoscopy in 09/2018 and no history of diverticuliitis.    • GERD (gastroesophageal reflux disease)     Managed on Omeprazole. Should take medication about 30 minutes prior to meal.  The patient should avoid caffeinated products. The patient should avoid carbonated beverages. The patient was instructed  not to eat within 3 hours of bedtime. Pt instructed to avoid fatty meals. Suggested weight reduction.   • IFG (impaired fasting glucose) 10/21/2020    Advised to follow a low carbohydrate diet and to reduce BMI < 25.  Advised to exercise for at least 2.5 hours per week.     • COURT (obstructive sleep apnea) 03/07/2018    Managed on CPAP     • COURT on CPAP     Pulmonologist: Dr. Pulido. Managed with CPAP at 10 CM H2O   • Osteoarthritis of both knees     Orthopedic surgeon: Dr. Cheung. S/P left knee hemiarthroplasty in 2007. X-rays in 12/2014 reveal bilateral small effusions, advanced DJD left knee and mild right knee DJD. MRI of right knee (3/2015) with severe medial compartment osteoarthrosis with subchondral fracture of medial femoral condyle with associated osteochondral fracture posteriorly. Has medial femoral condyle avascular necrosis.   • Other hyperlipidemia 10/21/2020    Managed with diet. Requires periodic monitoring of lipids and liver function tests.  Advised to follow low fat diet and to keep BMI < 25.  Advised to exercise for 2.5 hours per week.     • Polyp of colon 03/07/2018    Gastroenterologist: Dr. Dunaway. S/P colonoscopy with polypectomy in 2004. Colonoscopy with polypectomy x 7 (5 tubular adenomas and 2 hyperplastic polyps) on 11/5/2014. Colonoscopy in 09/2018 with polypectomy x 5 (Tubular adenoma, sessile serrated adenoma and hyperplastic polyp). Next due in 09/2023.        Past Surgical History:   Procedure Laterality Date   • BONE CYST EXCISION Left     Left humeral bone cyst excision   • COLONOSCOPY W/ POLYPECTOMY     • JOINT REPLACEMENT      RTKR 2014, partial left knee 2008   • REVISION TOTAL KNEE ARTHROPLASTY Right 01/2020    Dr. Harman   • VARICOCELECTOMY  1985   • VASECTOMY         Family History   Problem Relation Age of Onset   • Arthritis Biological Mother    • Valvular heart disease Biological Mother    • COPD Biological Father    • Arthritis Biological Sister    • Arthritis  Biological Brother    • Schizophrenia Biological Brother    • No Known Problems Biological Daughter    • No Known Problems Biological Daughter    • No Known Problems Biological Sister    • No Known Problems Biological Son        Social History     Tobacco Use   • Smoking status: Never   • Smokeless tobacco: Never   Vaping Use   • Vaping Use: Never used   Substance Use Topics   • Alcohol use: Yes     Alcohol/week: 6.0 - 9.0 standard drinks     Types: 6 - 9 Shots of liquor per week   • Drug use: No       Patient Care Team:  Tom Douglas MD as PCP - General       Allergies and Medications       Allergies   Allergen Reactions   • Cashew Nut    • Hydromorphone      Other reaction(s): Urticaria   • Golden Flavor      Other reaction(s):  shock, vasovagal syncope  mangos    • Pistachio Nut    • Sunflower Seed Angioedema         Current Outpatient Medications   Medication Sig Dispense Refill   • amoxicillin (AMOXIL) 500 mg tablet Take 4 pills one hour prior to procedure 4 tablet 1   • amoxicillin-pot clavulanate (AUGMENTIN) 875-125 mg per tablet Take 1 tablet by mouth 2 (two) times a day for 7 days. 14 tablet 0   • aspirin 81 mg chewable tablet Take 1 tablet (81 mg total) by mouth daily. For 4 weeks to prevent blood clots. 60 tablet 0   • azelastine (ASTELIN) 137 mcg (0.1 %) nasal spray Administer 2 sprays into each nostril 2 (two) times a day. Use in each nostril as directed. 30 mL 6   • B-complex with vitamin C tablet Take 1 tablet by mouth daily.      • cetirizine 10 mg tablet Take 10 mg by mouth daily.     • EPINEPHrine (EPIPEN) 0.3 mg/0.3 mL injection syringe Inject 0.3 mL (0.3 mg total) into the thigh as needed for anaphylaxis. 2 each 1   • escitalopram (LEXAPRO) 20 mg tablet Take 1 tablet (20 mg total) by mouth daily.     • MULTIVITAMIN ORAL Take 1 tablet by mouth daily.       • omeprazole (PriLOSEC) 20 mg capsule Take 20 mg by mouth every other day. Take 30 minutes prior to a meal.      • scopolamine (TRANSDERM-SCOP)  "1 mg over 3 days transdermal patch Place 1 patch on the skin every 3rd (third) day. 5 patch 0     No current facility-administered medications for this visit.          Review of Systems       Review of Systems   Constitutional: Negative for chills, fatigue and fever.   HENT: Positive for congestion, rhinorrhea and sore throat. Negative for ear pain, sinus pressure, sinus pain and sneezing.    Eyes: Negative for redness.   Respiratory: Positive for cough. Negative for chest tightness, shortness of breath and wheezing.    Cardiovascular: Negative for chest pain.   Gastrointestinal: Negative for abdominal pain, diarrhea, nausea and vomiting.   Musculoskeletal: Negative for arthralgias, myalgias and neck stiffness.   Skin: Negative for rash.   Psychiatric/Behavioral: Negative for confusion.        Physical Examination       Objective     Vitals:    01/17/23 0855   BP: 118/78   Pulse: 80   Resp: 16   Temp: 36.6 °C (97.9 °F)   SpO2: 97%       Pulse Readings from Last 5 Encounters:   01/17/23 80   10/19/22 81   09/28/22 80   03/17/22 79   10/13/21 74       Wt Readings from Last 5 Encounters:   01/17/23 131 kg (288 lb 3.2 oz)   10/19/22 129 kg (284 lb 3.2 oz)   09/28/22 127 kg (280 lb)   03/17/22 127 kg (280 lb)   10/13/21 125 kg (275 lb 12.8 oz)       Ht Readings from Last 5 Encounters:   01/17/23 1.791 m (5' 10.5\")   10/19/22 1.791 m (5' 10.5\")   09/28/22 1.778 m (5' 10\")   03/17/22 1.778 m (5' 10\")   10/13/21 1.791 m (5' 10.5\")       BP Readings from Last 5 Encounters:   01/17/23 118/78   10/19/22 120/80   09/28/22 130/74   03/17/22 124/80   10/13/21 124/80       BMI Readings from Last 4 Encounters:   01/17/23 40.77 kg/m²   10/19/22 40.20 kg/m²   09/28/22 40.18 kg/m²   03/17/22 40.18 kg/m²       Physical Exam  Constitutional:       General: He is not in acute distress.     Appearance: He is well-developed. He is not ill-appearing, toxic-appearing or diaphoretic.   HENT:      Right Ear: Tympanic membrane, ear canal and " external ear normal.      Left Ear: Tympanic membrane, ear canal and external ear normal.      Nose: Mucosal edema, congestion and rhinorrhea present.      Right Sinus: No maxillary sinus tenderness or frontal sinus tenderness.      Left Sinus: No maxillary sinus tenderness or frontal sinus tenderness.      Mouth/Throat:      Pharynx: Uvula midline. No oropharyngeal exudate, posterior oropharyngeal erythema or uvula swelling.      Tonsils: No tonsillar exudate.   Eyes:      General: Lids are normal.         Right eye: No discharge.         Left eye: No discharge.      Conjunctiva/sclera:      Right eye: Right conjunctiva is not injected. No exudate.     Left eye: Left conjunctiva is not injected. No exudate.     Pupils: Pupils are equal, round, and reactive to light.   Neck:      Thyroid: No thyroid mass or thyromegaly.   Cardiovascular:      Rate and Rhythm: Normal rate and regular rhythm.      Heart sounds: Normal heart sounds, S1 normal and S2 normal. No murmur heard.    No gallop.   Pulmonary:      Effort: Pulmonary effort is normal. No tachypnea, accessory muscle usage or respiratory distress.      Breath sounds: Normal breath sounds. No stridor. No wheezing, rhonchi or rales.   Musculoskeletal:      Cervical back: Normal range of motion and neck supple.   Neurological:      Mental Status: He is alert.   Psychiatric:         Mood and Affect: Mood normal.         Behavior: Behavior normal. Behavior is cooperative.          Laboratory Results     Lab Results   Component Value Date    WBC 5.27 10/25/2022    WBC 4.94 11/01/2021    WBC 4.7 10/20/2020    HGB 14.4 10/25/2022    HGB 14.1 11/01/2021    HGB 14.4 10/20/2020    HCT 44.0 10/25/2022    HCT 44.2 11/01/2021    HCT 44.2 10/20/2020    MCV 89.2 10/25/2022    MCV 90.6 11/01/2021    MCV 89 10/20/2020     10/25/2022     11/01/2021     10/20/2020        Lab Results   Component Value Date    GLUCOSE 97 10/25/2022    GLUCOSE 93 11/01/2021     GLUCOSE 102 (H) 10/20/2020    CALCIUM 9.1 10/25/2022    CALCIUM 9.0 11/01/2021    CALCIUM 9.4 02/07/2020     10/25/2022     11/01/2021     10/20/2020    K 4.5 10/25/2022    K 4.8 11/01/2021    K 4.9 10/20/2020    CO2 24 10/25/2022    CO2 25 11/01/2021    CO2 22 10/20/2020     10/25/2022     11/01/2021     10/20/2020    BUN 19 10/25/2022    BUN 14 11/01/2021    BUN 17 10/20/2020    CREATININE 1.0 10/25/2022    CREATININE 1.0 11/01/2021    CREATININE 0.88 10/20/2020    ALKPHOS 58 10/25/2022    ALKPHOS 57 11/01/2021    ALKPHOS 78 10/20/2020    AST 23 10/25/2022    AST 18 11/01/2021    AST 17 10/20/2020    ALT 20 10/25/2022    ALT 18 11/01/2021    ALT 18 10/20/2020    BILITOT 0.6 10/25/2022    BILITOT 0.8 11/01/2021    BILITOT 0.3 10/20/2020    ALBUMIN 3.8 10/25/2022    ALBUMIN 3.5 11/01/2021    ALBUMIN 4.2 10/20/2020       Lab Results   Component Value Date    CHOL 191 10/25/2022    CHOL 183 11/01/2021    CHOL 203 (H) 10/20/2020     Lab Results   Component Value Date    HDL 64 10/25/2022    HDL 62 11/01/2021    HDL 74 10/20/2020     Lab Results   Component Value Date    LDLCALC 115 (H) 10/25/2022    LDLCALC 111 (H) 11/01/2021    LDLCALC 119 (H) 10/20/2020     Lab Results   Component Value Date    TRIG 58 10/25/2022    TRIG 49 11/01/2021    TRIG 52 10/20/2020       The 10-year ASCVD risk score (Millie VAZQUEZ, et al., 2019) is: 12.9%    Values used to calculate the score:      Age: 67 years      Sex: Male      Is Non- : No      Diabetic: No      Tobacco smoker: No      Systolic Blood Pressure: 118 mmHg      Is BP treated: No      HDL Cholesterol: 64 mg/dL      Total Cholesterol: 191 mg/dL    Lab Results   Component Value Date    TSH 1.31 10/25/2022    TSH 0.55 11/01/2021    TSH 0.876 10/20/2020       Lab Results   Component Value Date    HGBA1C 5.4 01/05/2016       Lab Results   Component Value Date    PSA 0.47 10/25/2022    PSA 0.59 11/01/2021    PSA 0.7 10/20/2020        Lab Results   Component Value Date    HEPCAB <0.1 03/06/2018       Immunization History   Administered Date(s) Administered   • INFLUENZA VACCINE QUAD ADJUVANTED 65 and OLDER 10/09/2020, 10/13/2021   • Influenza Split Preservative Free ID 12/19/2012   • Influenza TIV (IM) 11/03/2010   • Influenza Vaccine 65 And Older Preservative Free 10/16/2019   • Influenza Vaccine High Dose 65 And Older 09/21/2022   • Influenza Vaccine Quadrivalent 3 Yr And Older 11/11/2015, 09/25/2018   • Influenza Vaccine Quadrivalent Preservative Free 6-35 Months 09/27/2013, 11/21/2014, 10/10/2016   • Influenza, Unspecified 11/21/2014, 10/10/2016, 11/06/2017   • Pneumococcal Conjugate 13-Valent 11/30/2018   • Pneumococcal Polysaccharide 10/09/2020   • SARS-COV-2 (COVID-19) VACCINE PFIZER BIVALENT BOOSTER 12 years and older (Cruz Cap) 09/21/2022   • SARS-COV-2 (COVID-19) VACCINE, MODERNA 02/25/2021, 03/25/2021, 11/10/2021   • Tdap 03/03/2011, 08/06/2021   • Zoster 11/21/2014   • Zoster Vaccine Recombinant Adjuvanted (Shingrix) 12/11/2020, 03/11/2021         Health Maintenance Topics with due status: Not Due       Topic Last Completion Date    Colorectal Cancer Screening 09/14/2018    DTaP, Tdap, and Td Vaccines 08/06/2021    Medicare Annual Wellness Visit 10/19/2022    Depression Screening 10/19/2022    Falls Risk Screening 10/19/2022     Health Maintenance Topics with due status: Completed       Topic Last Completion Date    Hepatitis C Screening 03/06/2018    Pneumococcal (65 years and older) 10/09/2020    Zoster Vaccine 03/11/2021    Influenza Vaccine 09/21/2022    COVID-19 Vaccine 09/21/2022     Health Maintenance Topics with due status: Aged Out       Topic Date Due    Meningococcal ACWY Aged Out    HIB Vaccines Aged Out    Hepatitis B Vaccines Aged Out    IPV Vaccines Aged Out    HPV Vaccines Aged Out        Assessment and Plan       Assessment/Plan     Problem List Items Addressed This Visit     Acute non-recurrent sinusitis -  Primary    Current Assessment & Plan     You have been diagnosed with an acute sinus infection.  Antibiotics are indicated at this time.  Treat with Augmentin 875 mg one tablet 2 times per day for 7 days. Please finish all of the medication in order to fully eradicate the infection.  Treat with Acetaminophen 1000 mg 3 times per day as needed for fever, headache, or sore throat.  Drink plenty of fluids: at least 8 large glasses of fluid a day. Good fluid choices are water, fruit juices high in Vitamin C, tea, gelatin, or broths and soups.   Take Mucinex over the counter as directed on package.  Use Ocean nasal spray as often as needed.  Advised to use live active cultured yogurt while on antibiotics to avoid antibiotic associated diarrhea.  Advised to eat foods that are more binding while on antibiotics to avoid antibiotic associated diarrhea. Eat bananas, white rice, applesauce, and white breads.  Advised to call if not resolving or if worsening.            Obesity, morbid (CMS/HCC)    Current Assessment & Plan     Recommend weight loss.            Return if symptoms worsen or fail to improve.    No orders of the defined types were placed in this encounter.             Tom Douglas MD    1/17/2023

## 2023-01-17 NOTE — PATIENT INSTRUCTIONS
Problem List Items Addressed This Visit       Acute non-recurrent sinusitis - Primary     You have been diagnosed with an acute sinus infection.  Antibiotics are indicated at this time.  Treat with Augmentin 875 mg one tablet 2 times per day for 7 days. Please finish all of the medication in order to fully eradicate the infection.  Treat with Acetaminophen 1000 mg 3 times per day as needed for fever, headache, or sore throat.  Drink plenty of fluids: at least 8 large glasses of fluid a day. Good fluid choices are water, fruit juices high in Vitamin C, tea, gelatin, or broths and soups.   Take Mucinex over the counter as directed on package.  Use Ocean nasal spray as often as needed.  Advised to use live active cultured yogurt while on antibiotics to avoid antibiotic associated diarrhea.  Advised to eat foods that are more binding while on antibiotics to avoid antibiotic associated diarrhea. Eat bananas, white rice, applesauce, and white breads.  Advised to call if not resolving or if worsening.

## 2023-01-19 ENCOUNTER — OFFICE VISIT (OUTPATIENT)
Dept: PULMONOLOGY | Facility: CLINIC | Age: 68
End: 2023-01-19
Payer: MEDICARE

## 2023-01-19 VITALS
RESPIRATION RATE: 16 BRPM | HEIGHT: 70 IN | WEIGHT: 281 LBS | SYSTOLIC BLOOD PRESSURE: 120 MMHG | DIASTOLIC BLOOD PRESSURE: 78 MMHG | HEART RATE: 72 BPM | OXYGEN SATURATION: 98 % | BODY MASS INDEX: 40.23 KG/M2

## 2023-01-19 DIAGNOSIS — J30.2 SEASONAL ALLERGIC RHINITIS, UNSPECIFIED TRIGGER: Chronic | ICD-10-CM

## 2023-01-19 DIAGNOSIS — G47.33 OSA (OBSTRUCTIVE SLEEP APNEA): Primary | Chronic | ICD-10-CM

## 2023-01-19 DIAGNOSIS — G47.34 NOCTURNAL HYPOXEMIA: ICD-10-CM

## 2023-01-19 DIAGNOSIS — K21.9 GASTROESOPHAGEAL REFLUX DISEASE, UNSPECIFIED WHETHER ESOPHAGITIS PRESENT: Chronic | ICD-10-CM

## 2023-01-19 DIAGNOSIS — J01.90 ACUTE NON-RECURRENT SINUSITIS, UNSPECIFIED LOCATION: ICD-10-CM

## 2023-01-19 PROCEDURE — 99215 OFFICE O/P EST HI 40 MIN: CPT | Mod: 25 | Performed by: INTERNAL MEDICINE

## 2023-01-19 PROCEDURE — 94375 RESPIRATORY FLOW VOLUME LOOP: CPT | Performed by: INTERNAL MEDICINE

## 2023-01-19 NOTE — PROGRESS NOTES
Pulmonary Evaluation         Dear Dr. Santos,       Thank you for the opportunity to reevaluate your patient Titus Ray a 67 y.o.  male who is seen today in the office for follow-up of moderate to severe obstructive sleep apnea with nocturnal hypoxemia.    History of Present Illness:  Deric returns the office having received his new auto CPAP demonstrating excellent compliance averaging 8 hours and 47 minutes of use with no further snoring, nocturnal awakening, morning headaches or daytime somnolence.  Has had no leg jerks, sleepwalking, night terrors, hypnagogic or hypnopompic hallucinations, cataplexy or sleep paralysis.  He understands instructions regarding cleaning his equipment and will obtain new heated tubing, filters and interface as needed.  He has recently complained of some nasal irritation for which I have recommended supportive care and switch to full facemask until irritation has resolved.    He is recovering from URI, non-COVID complicated by sinusitis improved on Augmentin.  Has had less upper airway symptoms.  He denies coughing, sputum production, wheezing, chest tightness or resting dyspnea.  Exercise tolerance is stable.  He has had no hemoptysis.  He denies dyspepsia, dysphagia or dysphonia.  He denies chest pain, leg pain or swelling, palpitations, dizziness or syncope.  He denies fever, chills, night sweats, weight change or constitutional symptoms.  He has had no significant interval weight loss.    He has no history of tobacco use.  He is a trained  but has no significant inhalation, occupational or recreational exposure.  Environmental survey is negative.  He has had no recent travel or contacts.        Past Medical History:   Diagnosis Date   • Allergic rhinitis     Controlled with zyrtec and Fluticasone nasal spray as needed. Worse in spring and fall.   • Attention deficit disorder (ADD) in adult     Managed on Straterra in the past but not effective.   •  Carpal tunnel syndrome 05/30/2018    Neurologist: Dr. Truong. EMG in 05/2018 with bilateral median neuropathy with right severe and left mild to moderate. Being treated with wrist splints.   • Complaint of nasal congestion     Dr Douglas prescribed Augmentin last day 1/16/20   • COVID-19 11/2022   • Depression 03/07/2018    Controlled with Escitalopram.    • Diverticulosis 09/17/2018    Seen in the sigmoid colon during colonoscopy in 09/2018 and no history of diverticuliitis.    • GERD (gastroesophageal reflux disease)     Managed on Omeprazole. Should take medication about 30 minutes prior to meal.  The patient should avoid caffeinated products. The patient should avoid carbonated beverages. The patient was instructed not to eat within 3 hours of bedtime. Pt instructed to avoid fatty meals. Suggested weight reduction.   • IFG (impaired fasting glucose) 10/21/2020    Advised to follow a low carbohydrate diet and to reduce BMI < 25.  Advised to exercise for at least 2.5 hours per week.     • COURT (obstructive sleep apnea) 03/07/2018    Managed on CPAP     • COURT on CPAP     Pulmonologist: Dr. Pulido. Managed with CPAP at 10 CM H2O   • Osteoarthritis of both knees     Orthopedic surgeon: Dr. Cheung. S/P left knee hemiarthroplasty in 2007. X-rays in 12/2014 reveal bilateral small effusions, advanced DJD left knee and mild right knee DJD. MRI of right knee (3/2015) with severe medial compartment osteoarthrosis with subchondral fracture of medial femoral condyle with associated osteochondral fracture posteriorly. Has medial femoral condyle avascular necrosis.   • Other hyperlipidemia 10/21/2020    Managed with diet. Requires periodic monitoring of lipids and liver function tests.  Advised to follow low fat diet and to keep BMI < 25.  Advised to exercise for 2.5 hours per week.     • Polyp of colon 03/07/2018    Gastroenterologist: Dr. Dunaway. S/P colonoscopy with polypectomy in 2004. Colonoscopy with polypectomy x 7  (5 tubular adenomas and 2 hyperplastic polyps) on 11/5/2014. Colonoscopy in 09/2018 with polypectomy x 5 (Tubular adenoma, sessile serrated adenoma and hyperplastic polyp). Next due in 09/2023.      Past Surgical History:   Procedure Laterality Date   • BONE CYST EXCISION Left     Left humeral bone cyst excision   • COLONOSCOPY W/ POLYPECTOMY     • JOINT REPLACEMENT      RTKR 2014, partial left knee 2008   • REVISION TOTAL KNEE ARTHROPLASTY Right 01/2020    Dr. Harman   • VARICOCELECTOMY  1985   • VASECTOMY       Allergies   Allergen Reactions   • Cashew Nut    • Hydromorphone      Other reaction(s): Urticaria   • [place] Flavor      Other reaction(s):  shock, vasovagal syncope  mangos    • Pistachio Nut    • Sunflower Seed Angioedema     Social History     Socioeconomic History   • Marital status:      Spouse name: Marisela    • Number of children: 3   • Years of education: None   • Highest education level: None   Occupational History   • Occupation:  - Retired     Comment: 3 days week    Tobacco Use   • Smoking status: Never   • Smokeless tobacco: Never   Vaping Use   • Vaping Use: Never used   Substance and Sexual Activity   • Alcohol use: Yes     Alcohol/week: 6.0 - 9.0 standard drinks     Types: 6 - 9 Shots of liquor per week   • Drug use: No   • Sexual activity: Yes     Social Determinants of Health     Financial Resource Strain: Low Risk    • Difficulty of Paying Living Expenses: Not hard at all   Food Insecurity: No Food Insecurity   • Worried About Running Out of Food in the Last Year: Never true   • Ran Out of Food in the Last Year: Never true   Transportation Needs: No Transportation Needs   • Lack of Transportation (Medical): No   • Lack of Transportation (Non-Medical): No   Physical Activity: Sufficiently Active   • Days of Exercise per Week: 6 days   • Minutes of Exercise per Session: 60 min     Family History   Problem Relation Age of Onset   • Arthritis Biological Mother    •  Valvular heart disease Biological Mother    • COPD Biological Father    • Arthritis Biological Sister    • Arthritis Biological Brother    • Schizophrenia Biological Brother    • No Known Problems Biological Daughter    • No Known Problems Biological Daughter    • No Known Problems Biological Sister    • No Known Problems Biological Son        Current Outpatient Medications:   •  aspirin 81 mg chewable tablet, Take 1 tablet (81 mg total) by mouth daily. For 4 weeks to prevent blood clots., Disp: 60 tablet, Rfl: 0  •  azelastine (ASTELIN) 137 mcg (0.1 %) nasal spray, Administer 2 sprays into each nostril 2 (two) times a day. Use in each nostril as directed., Disp: 30 mL, Rfl: 6  •  cetirizine 10 mg tablet, Take 10 mg by mouth daily., Disp: , Rfl:   •  EPINEPHrine (EPIPEN) 0.3 mg/0.3 mL injection syringe, Inject 0.3 mL (0.3 mg total) into the thigh as needed for anaphylaxis., Disp: 2 each, Rfl: 1  •  escitalopram (LEXAPRO) 20 mg tablet, Take 1 tablet (20 mg total) by mouth daily., Disp: , Rfl:   •  MULTIVITAMIN ORAL, Take 1 tablet by mouth daily.  , Disp: , Rfl:   •  omeprazole (PriLOSEC) 20 mg capsule, Take 20 mg by mouth every other day. Take 30 minutes prior to a meal. , Disp: , Rfl:   •  amoxicillin (AMOXIL) 500 mg tablet, Take 4 pills one hour prior to procedure (Patient not taking: Reported on 1/19/2023), Disp: 4 tablet, Rfl: 1  •  amoxicillin-pot clavulanate (AUGMENTIN) 875-125 mg per tablet, Take 1 tablet by mouth 2 (two) times a day for 7 days. (Patient not taking: Reported on 1/19/2023), Disp: 14 tablet, Rfl: 0  •  B-complex with vitamin C tablet, Take 1 tablet by mouth daily. , Disp: , Rfl:   •  scopolamine (TRANSDERM-SCOP) 1 mg over 3 days transdermal patch, Place 1 patch on the skin every 3rd (third) day. (Patient not taking: Reported on 1/19/2023), Disp: 5 patch, Rfl: 0  Immunization History   Administered Date(s) Administered   • INFLUENZA VACCINE QUAD ADJUVANTED 65 and OLDER 10/09/2020, 10/13/2021   •  "Influenza Split Preservative Free ID 12/19/2012   • Influenza TIV (IM) 11/03/2010   • Influenza Vaccine 65 And Older Preservative Free 10/16/2019   • Influenza Vaccine High Dose 65 And Older 09/21/2022   • Influenza Vaccine Quadrivalent 3 Yr And Older 11/11/2015, 09/25/2018   • Influenza Vaccine Quadrivalent Preservative Free 6-35 Months 09/27/2013, 11/21/2014, 10/10/2016   • Influenza, Unspecified 11/21/2014, 10/10/2016, 11/06/2017   • Pneumococcal Conjugate 13-Valent 11/30/2018   • Pneumococcal Polysaccharide 10/09/2020   • SARS-COV-2 (COVID-19) VACCINE PFIZER BIVALENT BOOSTER 12 years and older ([place]) 09/21/2022   • SARS-COV-2 (COVID-19) VACCINE, MODERNA 02/25/2021, 03/25/2021, 11/10/2021   • Tdap 03/03/2011, 08/06/2021   • Zoster 11/21/2014   • Zoster Vaccine Recombinant Adjuvanted (Shingrix) 12/11/2020, 03/11/2021     Immunization status: up to date and documented.      Review of Systems:  A Full 14  point review of systems was obtained and is negative or otherwise stated in the HPI.      Physical Examination:  This is a well-developed well-nourished 67 y.o.year old male in no acute distress.  Vital Signs:   Visit Vitals  /78   Pulse 72   Resp 16   Ht 1.778 m (5' 10\")   Wt 127 kg (281 lb)   SpO2 98%   BMI 40.32 kg/m²     HEENT:  Normocephalic, atraumatic.  PERRLA. There is mild nasal mucosal inflammation without pharyngitis.  There is no evidence of thrush.  Crowding of the posterior oropharynx with macroglossia greatest at the base.  Eyes:  Sclera anicteric, conjunctiva pink, pupils equal and reactive to light  Neck: no adenopathy, no JVD  Chest:  Lungs clear to auscultation and percussion bilaterally, no crackles, rubs, rhonchi or wheezing.  Cor:  RR, normal S1 and S2, no murmurs, gallops or rubs.   There is no accentuated P2 or right ventricular heave.  Abd: Soft Nontender, nondistended, normal bowel sounds.  There is no hepatosplenomegaly.  Extremities: no clubbing, cyanosis or edema.  Skin: no " rash, jaundice or petechiae.  The skin is otherwise intact.  Neuro: alert and oriented with no focal deficits.  Lymph nodes:There is no palpable peripheral  lymphadenopathy.  Joints: no deformities, no warmth or erythema.  Psych: normal affect, good eye contact.      Diagnostic Data:  I have independently reviewed the physiologic sleep data including efficacy and compliance from ResMed S 10 AutoSet 5-20 cm H2O with EPR level 3, full-time in standard response.  He achieves 8 hours and 47 minutes average per night.  Maximum and 95th percentile pressures are below the maximum set pressure.  At median pressure of 7.7 cm H2O there is negligible leak of 9.3 L/min.  These pressures are sufficient to eliminate all significant apneas and hypopneas with AHI of 1.3 events per hour.  There are no treatment-emergent central apneic episodes, Cheyne-Acevedo respirations or other pathologic sleep.  Approximately 20 minutes time was spent addressing this.    Labs:  I have personally reviewed all pertinent patient laboratory results.  ABG Results    No lab values to display.       CBC Results       10/25/22 11/01/21 10/20/20     0901 1016 0924    WBC 5.27 4.94 4.7    RBC 4.93 4.88 4.99    HGB 14.4 14.1 14.4    HCT 44.0 44.2 44.2    MCV 89.2 90.6 89    MCH 29.2 28.9 28.9    MCHC 32.7 31.9 32.6     253 277        BMP Results       10/25/22 11/01/21 10/20/20     0901 1016 0924     142 140    K 4.5 4.8 4.9    Cl 105 106 106    CO2 24 25 22    Glucose 97 93 102    BUN 19 14 17    Creatinine 1.0 1.0 0.88    Calcium 9.1 9.0 --    Anion Gap 10 11 --    EGFR >60.0 >60.0 89       103        PFT: Repeat spirometric data performed today is good technical study.  Spirograms tail.  Forced vital capacity 3.75 L (84% predicted).  FEV1 2.83 L (86% predicted).  FEV1 percent 76%.  FEF 25 to 75%, 92% predicted.  Flow volume loops are normal.  Impression: This is a normal study.  No previous studies are presently available for  comparison.      Imaging:  I have independently reviewed all available pertinent imaging.      Assessment:   #1.  History of moderate to severe obstructive sleep apnea initially diagnosed in 2017 with consistent symptomatology and risk factors that include crowding of the posterior oropharynx and increased nuchal and truncal body mass.  The application of CPAP is sufficient to eliminate apnea and hypoxemia and restore sleep to virtually normal architecture.  With excellent compliance he has had resolution of symptoms with no new problems or complications.      #2.  Resolving sinusitis complicating recent URI; feeling better with no sequela.    #3.  Allergic rhinitis/postnasal drip; this has been reasonably well controlled with combination intranasal steroids and topical H1 receptor antagonists.    Plan:  #1.  Continue optimal compliance with auto CPAP again trying to achieve 8 hours of use per night.  #2.  He will generally continue with P 10 nasal interface although with recent nasal irritation I will switch to a full facemask until healed and then resume nasal pillows.  #3.  He will treat the nasal irritation supportively otherwise.  #4.  Continue optimizing sleep hygiene, maintaining optimal body mass index with  judicious but definite weight loss, avoiding sleeping supine, limiting alcohol and sedatives, limiting caffeine to before noon and absolutely not operating machinery especially a motor vehicle if feeling fatigued.  #5.  Continue present nasal strategy for now.  #6.  Continue with annual seasonal flu vaccine as well as all other appropriate immunizations.  #7.  Continue appropriate cleaning of his equipment as well as regular replacement of tubing, filters and interface.  #8.  Otherwise continue the present plan for now.    I have discussed my findings concerns and recommendations at length with Deric.  All questions were asked and answered. Evaluation of supplies and equipment were performed.  Education  and instructions have been provided.  Follow-up has been arranged.  I will keep you apprised of my findings.  Please let me know if you have any questions.      Many thanks. Best regards.    Sincerely,    Hugo Pozo MD    1/19/2023    This letter was generated using speech recognition software.  Please excuse any typographical errors.

## 2023-03-06 ENCOUNTER — OFFICE VISIT (OUTPATIENT)
Dept: SURGERY | Facility: CLINIC | Age: 68
End: 2023-03-06
Payer: MEDICARE

## 2023-03-06 VITALS — WEIGHT: 285 LBS | HEIGHT: 70 IN | BODY MASS INDEX: 40.8 KG/M2

## 2023-03-06 DIAGNOSIS — L30.9 PERIANAL DERMATITIS: Primary | ICD-10-CM

## 2023-03-06 PROCEDURE — 99203 OFFICE O/P NEW LOW 30 MIN: CPT | Performed by: SURGERY

## 2023-03-06 NOTE — PROGRESS NOTES
Chief Complaint:   Chief Complaint   Patient presents with   • Hemorrhoids       HPI     Patient is a 67 y.o. male who presents with the following:      Intermittent itching and burning x 6 months. Itching can be distracting during the day and keep him up at at night. He admits to using [employer] wet wipes daily. Also uses hemorrhoid products and cortisone cream. It can be irritating to wipe. Notices spotting of blood on tp. Shower water can sting.    Reports difficulty getting clean after BMs.  Has 1 BMs per day.  Last colonoscopy ~2 years ago; (+) hx of polyps.    Medical History:   Past Medical History:   Diagnosis Date   • Allergic rhinitis     Controlled with zyrtec and Fluticasone nasal spray as needed. Worse in spring and fall.   • Attention deficit disorder (ADD) in adult     Managed on Straterra in the past but not effective.   • Carpal tunnel syndrome 05/30/2018    Neurologist: Dr. Truong. EMG in 05/2018 with bilateral median neuropathy with right severe and left mild to moderate. Being treated with wrist splints.   • Complaint of nasal congestion     Dr Douglas prescribed Augmentin last day 1/16/20   • COVID-19 11/2022   • Depression 03/07/2018    Controlled with Escitalopram.    • Diverticulosis 09/17/2018    Seen in the sigmoid colon during colonoscopy in 09/2018 and no history of diverticuliitis.    • GERD (gastroesophageal reflux disease)     Managed on Omeprazole. Should take medication about 30 minutes prior to meal.  The patient should avoid caffeinated products. The patient should avoid carbonated beverages. The patient was instructed not to eat within 3 hours of bedtime. Pt instructed to avoid fatty meals. Suggested weight reduction.   • IFG (impaired fasting glucose) 10/21/2020    Advised to follow a low carbohydrate diet and to reduce BMI < 25.  Advised to exercise for at least 2.5 hours per week.     • COURT (obstructive sleep apnea) 03/07/2018    Managed on CPAP     • COURT on CPAP      Pulmonologist: Dr. Pulido. Managed with CPAP at 10 CM H2O   • Osteoarthritis of both knees     Orthopedic surgeon: Dr. Cheung. S/P left knee hemiarthroplasty in 2007. X-rays in 12/2014 reveal bilateral small effusions, advanced DJD left knee and mild right knee DJD. MRI of right knee (3/2015) with severe medial compartment osteoarthrosis with subchondral fracture of medial femoral condyle with associated osteochondral fracture posteriorly. Has medial femoral condyle avascular necrosis.   • Other hyperlipidemia 10/21/2020    Managed with diet. Requires periodic monitoring of lipids and liver function tests.  Advised to follow low fat diet and to keep BMI < 25.  Advised to exercise for 2.5 hours per week.     • Polyp of colon 03/07/2018    Gastroenterologist: Dr. Dunaway. S/P colonoscopy with polypectomy in 2004. Colonoscopy with polypectomy x 7 (5 tubular adenomas and 2 hyperplastic polyps) on 11/5/2014. Colonoscopy in 09/2018 with polypectomy x 5 (Tubular adenoma, sessile serrated adenoma and hyperplastic polyp). Next due in 09/2023.        Surgical History:   Past Surgical History:   Procedure Laterality Date   • BONE CYST EXCISION Left     Left humeral bone cyst excision   • COLONOSCOPY W/ POLYPECTOMY     • JOINT REPLACEMENT      RTKR 2014, partial left knee 2008   • REVISION TOTAL KNEE ARTHROPLASTY Right 01/2020    Dr. Harman   • VARICOCELECTOMY  1985   • VASECTOMY         Social History:   Social History     Socioeconomic History   • Marital status:      Spouse name: Marisela    • Number of children: 3   • Years of education: Not on file   • Highest education level: Not on file   Occupational History   • Occupation:  - Retired     Comment: 3 days week    Tobacco Use   • Smoking status: Never   • Smokeless tobacco: Never   Vaping Use   • Vaping status: Never Used   Substance and Sexual Activity   • Alcohol use: Yes     Alcohol/week: 6.0 - 9.0 standard drinks of alcohol     Types: 6  - 9 Shots of liquor per week   • Drug use: No   • Sexual activity: Yes   Other Topics Concern   • Not on file   Social History Narrative   • Not on file     Social Determinants of Health     Financial Resource Strain: Low Risk  (10/19/2022)    Overall Financial Resource Strain (CARDIA)    • Difficulty of Paying Living Expenses: Not hard at all   Food Insecurity: No Food Insecurity (10/19/2022)    Hunger Vital Sign    • Worried About Running Out of Food in the Last Year: Never true    • Ran Out of Food in the Last Year: Never true   Transportation Needs: No Transportation Needs (10/19/2022)    PRAPARE - Transportation    • Lack of Transportation (Medical): No    • Lack of Transportation (Non-Medical): No   Physical Activity: Sufficiently Active (10/19/2022)    Exercise Vital Sign    • Days of Exercise per Week: 6 days    • Minutes of Exercise per Session: 60 min   Stress: Not on file   Social Connections: Not on file   Intimate Partner Violence: Not on file   Housing Stability: Not on file       Family History:   Family History   Problem Relation Age of Onset   • Arthritis Biological Mother    • Valvular heart disease Biological Mother    • COPD Biological Father    • Arthritis Biological Sister    • Arthritis Biological Brother    • Schizophrenia Biological Brother    • No Known Problems Biological Daughter    • No Known Problems Biological Daughter    • No Known Problems Biological Sister    • No Known Problems Biological Son        Allergies:   Cashew nut, Hydromorphone, [place] flavor, Pistachio nut, and Sunflower seed    Current Medications:      Current Outpatient Medications:   •  aspirin 81 mg chewable tablet, Take 1 tablet (81 mg total) by mouth daily. For 4 weeks to prevent blood clots., Disp: 60 tablet, Rfl: 0  •  azelastine (ASTELIN) 137 mcg (0.1 %) nasal spray, Administer 2 sprays into each nostril 2 (two) times a day. Use in each nostril as directed., Disp: 30 mL, Rfl: 6  •  B-complex with vitamin C  tablet, Take 1 tablet by mouth daily. , Disp: , Rfl:   •  cetirizine 10 mg tablet, Take 10 mg by mouth daily., Disp: , Rfl:   •  EPINEPHrine (EPIPEN) 0.3 mg/0.3 mL injection syringe, Inject 0.3 mL (0.3 mg total) into the thigh as needed for anaphylaxis., Disp: 2 each, Rfl: 1  •  escitalopram (LEXAPRO) 20 mg tablet, Take 1 tablet (20 mg total) by mouth daily., Disp: , Rfl:   •  MULTIVITAMIN ORAL, Take 1 tablet by mouth daily.  , Disp: , Rfl:   •  omeprazole (PriLOSEC) 20 mg capsule, Take 20 mg by mouth every other day. Take 30 minutes prior to a meal. , Disp: , Rfl:   •  amoxicillin (AMOXIL) 500 mg tablet, Take 4 pills one hour prior to procedure (Patient not taking: Reported on 1/19/2023), Disp: 4 tablet, Rfl: 1  •  scopolamine (TRANSDERM-SCOP) 1 mg over 3 days transdermal patch, Place 1 patch on the skin every 3rd (third) day. (Patient not taking: Reported on 1/19/2023), Disp: 5 patch, Rfl: 0    Review of Systems  All 14 systems reviewed and the findings are not pertinent to the current problem.    Objective     Vital Signs  There were no vitals filed for this visit.  Body mass index is 40.89 kg/m².      Physical Exam  General Appearance:  Well developed.  Well nourished.  In no acute distress.    Anorectal Examination:    No pilonidal sinus was observed.   No pilonidal cyst was observed.   Perianal skin was not erythematous.  No perianal wart was observed.  No anal fissure was observed.   Anus did not have a fistula.   Anal sphincter tone was normal.   No hemorrhoids were seen.   No external anal skin tags were observed.   Anus had no mass.  Rectum:  No rectal abscess was observed.   Rectal prolapse was not observed.   No rectal fistula was observed.   Rectal exam was not tender.   No rectal fluctuance was observed.  Rectal exam showed no mass.       Skin:  Dermatitis was seen on the hermelinda-anal skin.  Specifics:  Patches of irritated skin with various cuts.       Problem List Items Addressed This Visit         Infectious/Inflammatory    Perianal dermatitis - Primary     The patient has irritated skin around the anus and this will be treated with avoidance of hemorrhoid products and topical Calmoseptine to allow the skin to heal.   I will see them in one month.                  DEVORA Gore 3/6/2023 10:37 AM

## 2023-03-06 NOTE — LETTER
March 6, 2023     Tom Douglas MD  [address]  [id number]  [facility] [address]    Patient: Titus Ray  YOB: 1955  Date of Visit: 3/6/2023      Dear Dr. Douglas:    Thank you for referring Titus Ray to me for evaluation. Below are my notes for this consultation.    If you have questions, please do not hesitate to call me. I look forward to following your patient along with you.         Sincerely,        Roberto George MD        CC: No Recipients    Arabella Mckeon PA C  3/6/2023 10:37 AM  Sign when Signing Visit      Chief Complaint:   Chief Complaint   Patient presents with   • Hemorrhoids       HPI     Patient is a 67 y.o. male who presents with the following:      Intermittent itching and burning x 6 months. Itching can be distracting during the day and keep him up at at night. He admits to using [employer] wet wipes daily. Also uses hemorrhoid products and cortisone cream. It can be irritating to wipe. Notices spotting of blood on tp. Shower water can sting.    Reports difficulty getting clean after BMs.  Has 1 BMs per day.  Last colonoscopy ~2 years ago; (+) hx of polyps.    Medical History:   Past Medical History:   Diagnosis Date   • Allergic rhinitis     Controlled with zyrtec and Fluticasone nasal spray as needed. Worse in spring and fall.   • Attention deficit disorder (ADD) in adult     Managed on Straterra in the past but not effective.   • Carpal tunnel syndrome 05/30/2018    Neurologist: Dr. Truong. EMG in 05/2018 with bilateral median neuropathy with right severe and left mild to moderate. Being treated with wrist splints.   • Complaint of nasal congestion     Dr Douglas prescribed Augmentin last day 1/16/20   • COVID-19 11/2022   • Depression 03/07/2018    Controlled with Escitalopram.    • Diverticulosis 09/17/2018    Seen in the sigmoid colon during colonoscopy in 09/2018 and no history of diverticuliitis.    • GERD (gastroesophageal reflux disease)     Managed  on Omeprazole. Should take medication about 30 minutes prior to meal.  The patient should avoid caffeinated products. The patient should avoid carbonated beverages. The patient was instructed not to eat within 3 hours of bedtime. Pt instructed to avoid fatty meals. Suggested weight reduction.   • IFG (impaired fasting glucose) 10/21/2020    Advised to follow a low carbohydrate diet and to reduce BMI < 25.  Advised to exercise for at least 2.5 hours per week.     • COURT (obstructive sleep apnea) 03/07/2018    Managed on CPAP     • COURT on CPAP     Pulmonologist: Dr. Pulido. Managed with CPAP at 10 CM H2O   • Osteoarthritis of both knees     Orthopedic surgeon: Dr. Cheung. S/P left knee hemiarthroplasty in 2007. X-rays in 12/2014 reveal bilateral small effusions, advanced DJD left knee and mild right knee DJD. MRI of right knee (3/2015) with severe medial compartment osteoarthrosis with subchondral fracture of medial femoral condyle with associated osteochondral fracture posteriorly. Has medial femoral condyle avascular necrosis.   • Other hyperlipidemia 10/21/2020    Managed with diet. Requires periodic monitoring of lipids and liver function tests.  Advised to follow low fat diet and to keep BMI < 25.  Advised to exercise for 2.5 hours per week.     • Polyp of colon 03/07/2018    Gastroenterologist: Dr. Dunaway. S/P colonoscopy with polypectomy in 2004. Colonoscopy with polypectomy x 7 (5 tubular adenomas and 2 hyperplastic polyps) on 11/5/2014. Colonoscopy in 09/2018 with polypectomy x 5 (Tubular adenoma, sessile serrated adenoma and hyperplastic polyp). Next due in 09/2023.        Surgical History:   Past Surgical History:   Procedure Laterality Date   • BONE CYST EXCISION Left     Left humeral bone cyst excision   • COLONOSCOPY W/ POLYPECTOMY     • JOINT REPLACEMENT      RTKR 2014, partial left knee 2008   • REVISION TOTAL KNEE ARTHROPLASTY Right 01/2020    Dr. Harman   • VARICOCELECTOMY  1985   • VASECTOMY          Social History:   Social History     Socioeconomic History   • Marital status:      Spouse name: Marisela    • Number of children: 3   • Years of education: Not on file   • Highest education level: Not on file   Occupational History   • Occupation:  - Retired     Comment: 3 days week    Tobacco Use   • Smoking status: Never   • Smokeless tobacco: Never   Vaping Use   • Vaping status: Never Used   Substance and Sexual Activity   • Alcohol use: Yes     Alcohol/week: 6.0 - 9.0 standard drinks of alcohol     Types: 6 - 9 Shots of liquor per week   • Drug use: No   • Sexual activity: Yes   Other Topics Concern   • Not on file   Social History Narrative   • Not on file     Social Determinants of Health     Financial Resource Strain: Low Risk  (10/19/2022)    Overall Financial Resource Strain (CARDIA)    • Difficulty of Paying Living Expenses: Not hard at all   Food Insecurity: No Food Insecurity (10/19/2022)    Hunger Vital Sign    • Worried About Running Out of Food in the Last Year: Never true    • Ran Out of Food in the Last Year: Never true   Transportation Needs: No Transportation Needs (10/19/2022)    PRAPARE - Transportation    • Lack of Transportation (Medical): No    • Lack of Transportation (Non-Medical): No   Physical Activity: Sufficiently Active (10/19/2022)    Exercise Vital Sign    • Days of Exercise per Week: 6 days    • Minutes of Exercise per Session: 60 min   Stress: Not on file   Social Connections: Not on file   Intimate Partner Violence: Not on file   Housing Stability: Not on file       Family History:   Family History   Problem Relation Age of Onset   • Arthritis Biological Mother    • Valvular heart disease Biological Mother    • COPD Biological Father    • Arthritis Biological Sister    • Arthritis Biological Brother    • Schizophrenia Biological Brother    • No Known Problems Biological Daughter    • No Known Problems Biological Daughter    • No Known Problems  Biological Sister    • No Known Problems Biological Son        Allergies:   Cashew nut, Hydromorphone, Golden flavor, Pistachio nut, and Sunflower seed    Current Medications:      Current Outpatient Medications:   •  aspirin 81 mg chewable tablet, Take 1 tablet (81 mg total) by mouth daily. For 4 weeks to prevent blood clots., Disp: 60 tablet, Rfl: 0  •  azelastine (ASTELIN) 137 mcg (0.1 %) nasal spray, Administer 2 sprays into each nostril 2 (two) times a day. Use in each nostril as directed., Disp: 30 mL, Rfl: 6  •  B-complex with vitamin C tablet, Take 1 tablet by mouth daily. , Disp: , Rfl:   •  cetirizine 10 mg tablet, Take 10 mg by mouth daily., Disp: , Rfl:   •  EPINEPHrine (EPIPEN) 0.3 mg/0.3 mL injection syringe, Inject 0.3 mL (0.3 mg total) into the thigh as needed for anaphylaxis., Disp: 2 each, Rfl: 1  •  escitalopram (LEXAPRO) 20 mg tablet, Take 1 tablet (20 mg total) by mouth daily., Disp: , Rfl:   •  MULTIVITAMIN ORAL, Take 1 tablet by mouth daily.  , Disp: , Rfl:   •  omeprazole (PriLOSEC) 20 mg capsule, Take 20 mg by mouth every other day. Take 30 minutes prior to a meal. , Disp: , Rfl:   •  amoxicillin (AMOXIL) 500 mg tablet, Take 4 pills one hour prior to procedure (Patient not taking: Reported on 1/19/2023), Disp: 4 tablet, Rfl: 1  •  scopolamine (TRANSDERM-SCOP) 1 mg over 3 days transdermal patch, Place 1 patch on the skin every 3rd (third) day. (Patient not taking: Reported on 1/19/2023), Disp: 5 patch, Rfl: 0    Review of Systems  All 14 systems reviewed and the findings are not pertinent to the current problem.    Objective     Vital Signs  There were no vitals filed for this visit.  Body mass index is 40.89 kg/m².      Physical Exam  General Appearance:  Well developed.  Well nourished.  In no acute distress.    Anorectal Examination:    No pilonidal sinus was observed.   No pilonidal cyst was observed.   Perianal skin was not erythematous.  No perianal wart was observed.  No anal fissure  was observed.   Anus did not have a fistula.   Anal sphincter tone was normal.   No hemorrhoids were seen.   No external anal skin tags were observed.   Anus had no mass.  Rectum:  No rectal abscess was observed.   Rectal prolapse was not observed.   No rectal fistula was observed.   Rectal exam was not tender.   No rectal fluctuance was observed.  Rectal exam showed no mass.       Skin:  Dermatitis was seen on the hermelinda-anal skin.  Specifics:  Patches of irritated skin with various cuts.       Problem List Items Addressed This Visit        Infectious/Inflammatory    Perianal dermatitis - Primary     The patient has irritated skin around the anus and this will be treated with avoidance of hemorrhoid products and topical Calmoseptine to allow the skin to heal.   I will see them in one month.                  DEVORA Gore 3/6/2023 10:37 AM

## 2023-03-06 NOTE — PATIENT INSTRUCTIONS
Hermelinda-anal Dermatitis    Types  There seem to be three main types of hermelinda-anal dermatitis that I see in my practice:  1) Contact dermatitis caused by over-use of wet wipes, preparation H and Tux wipes, and many other topical agents.  2)  Dermatitis caused by a fungal skin infection.  3)  Dermatitis of unknown origin or “pruritus ani.”  All three of these types can be improved with very simple measures.    Symptoms  Hermelinda-anal dermatitis causes tingling, stinging, and itching of the hermelinda-anal skin.  Some people describe a “raw” sensation and the feeling of burning when the shower or bath water touches that area.  People with hermelinda-anal dermatitis have sensitivity and pain when cleaning up after a bowel movement but NOT specifically during the act of defecation.    Physical Exam  There is usually thickening of the hermelinda-anal skin with tiny nicks and cuts that are only visible upon very close inspection.  This is why most physicians miss the diagnosis.  Contact dermatitis makes the skin look raw and denuded.  Fungal dermatitis looks bright red with a sharp edge around it  it from normal skin.  Pruritis ani can look like thickened, white skin with linear cracks in it.    Treatment  All of these disorders can be treated with the same two simple steps:  1) stop using wet wipes and topical hemorrhoid creams.  2)  use calmoseptine or some other inert cream that protects and soothes the skin.  Fungal infections usually resolve quickly with the application of the correct topical anti-fungal cream.  Contact dermatitis usually resolves just with avoidance of wet wipes.  Pruritis ani (in the absence of any other skin disorder like fungal infection or contact dermatitis) can be treated with steroids right off the bat.    Pitfalls  As you can see, NONE of these disorders will respond at all to Preparation H and similar hemorrhoid creams.  These agents can make the irritation worse.  Steroids are also commonly prescribed when  patients complain of hermelinda-anal itching, and this is a mistake.  Steroids will actually make contact dermatitis and fungal infections MUCH WORSE.

## 2023-07-11 ENCOUNTER — OFFICE VISIT (OUTPATIENT)
Dept: PULMONOLOGY | Facility: CLINIC | Age: 68
End: 2023-07-11
Payer: MEDICARE

## 2023-07-11 VITALS
OXYGEN SATURATION: 97 % | HEART RATE: 88 BPM | SYSTOLIC BLOOD PRESSURE: 124 MMHG | WEIGHT: 281 LBS | BODY MASS INDEX: 40.23 KG/M2 | HEIGHT: 70 IN | DIASTOLIC BLOOD PRESSURE: 80 MMHG

## 2023-07-11 DIAGNOSIS — K21.9 GASTROESOPHAGEAL REFLUX DISEASE, UNSPECIFIED WHETHER ESOPHAGITIS PRESENT: Chronic | ICD-10-CM

## 2023-07-11 DIAGNOSIS — G47.33 OSA (OBSTRUCTIVE SLEEP APNEA): Primary | Chronic | ICD-10-CM

## 2023-07-11 DIAGNOSIS — G47.34 NOCTURNAL HYPOXEMIA: ICD-10-CM

## 2023-07-11 PROCEDURE — 99215 OFFICE O/P EST HI 40 MIN: CPT | Mod: 25 | Performed by: INTERNAL MEDICINE

## 2023-07-11 PROCEDURE — 94375 RESPIRATORY FLOW VOLUME LOOP: CPT | Performed by: INTERNAL MEDICINE

## 2023-07-11 NOTE — PROGRESS NOTES
Pulmonary Evaluation         Dear Tom Castaneda MD,      Thank you for the opportunity to reevaluate your patient Titus Ray a 67 y.o.  male who is seen today in the office for follow-uo of moderate to severe obstructive sleep apnea with nocturnal hypoxemia.     History of Present Illness:  Deric returns the office  demonstrating excellent compliance with auto titrating CPAP averaging 8 hours and 48 minutes of use with no further snoring, nocturnal awakening, morning headaches or daytime somnolence.  Has had no leg jerks, sleepwalking, night terrors, hypnagogic or hypnopompic hallucinations, cataplexy or sleep paralysis.  He understands instructions regarding cleaning his equipment and will obtain new heated tubing, filters and interface as needed.        He has had no recent  upper airway symptoms.  He denies coughing, sputum production, wheezing, chest tightness or resting dyspnea.  Exercise tolerance is stable.  He has had no hemoptysis.  He denies dyspepsia, dysphagia or dysphonia.  He denies chest pain, leg pain or swelling, palpitations, dizziness or syncope.  He denies fever, chills, night sweats, weight change or constitutional symptoms.  He has had no significant interval weight loss.     He has no history of tobacco use.  He is a trained  but has no significant inhalation, occupational or recreational exposure.  Environmental survey is negative.  He has had no recent travel or contacts.           Past Medical History:   Diagnosis Date   • Allergic rhinitis     Controlled with zyrtec and Fluticasone nasal spray as needed. Worse in spring and fall.   • Attention deficit disorder (ADD) in adult     Managed on Straterra in the past but not effective.   • Carpal tunnel syndrome 05/30/2018    Neurologist: Dr. Truong. EMG in 05/2018 with bilateral median neuropathy with right severe and left mild to moderate. Being treated with wrist splints.   • Complaint of nasal congestion      Dr Douglas prescribed Augmentin last day 1/16/20   • COVID-19 11/2022   • Depression 03/07/2018    Controlled with Escitalopram.    • Diverticulosis 09/17/2018    Seen in the sigmoid colon during colonoscopy in 09/2018 and no history of diverticuliitis.    • GERD (gastroesophageal reflux disease)     Managed on Omeprazole. Should take medication about 30 minutes prior to meal.  The patient should avoid caffeinated products. The patient should avoid carbonated beverages. The patient was instructed not to eat within 3 hours of bedtime. Pt instructed to avoid fatty meals. Suggested weight reduction.   • IFG (impaired fasting glucose) 10/21/2020    Advised to follow a low carbohydrate diet and to reduce BMI < 25.  Advised to exercise for at least 2.5 hours per week.     • COURT (obstructive sleep apnea) 03/07/2018    Managed on CPAP     • COURT on CPAP     Pulmonologist: Dr. Pulido. Managed with CPAP at 10 CM H2O   • Osteoarthritis of both knees     Orthopedic surgeon: Dr. Cheung. S/P left knee hemiarthroplasty in 2007. X-rays in 12/2014 reveal bilateral small effusions, advanced DJD left knee and mild right knee DJD. MRI of right knee (3/2015) with severe medial compartment osteoarthrosis with subchondral fracture of medial femoral condyle with associated osteochondral fracture posteriorly. Has medial femoral condyle avascular necrosis.   • Other hyperlipidemia 10/21/2020    Managed with diet. Requires periodic monitoring of lipids and liver function tests.  Advised to follow low fat diet and to keep BMI < 25.  Advised to exercise for 2.5 hours per week.     • Polyp of colon 03/07/2018    Gastroenterologist: Dr. Dunaway. S/P colonoscopy with polypectomy in 2004. Colonoscopy with polypectomy x 7 (5 tubular adenomas and 2 hyperplastic polyps) on 11/5/2014. Colonoscopy in 09/2018 with polypectomy x 5 (Tubular adenoma, sessile serrated adenoma and hyperplastic polyp). Next due in 09/2023.      Past Surgical History:    Procedure Laterality Date   • BONE CYST EXCISION Left     Left humeral bone cyst excision   • COLONOSCOPY W/ POLYPECTOMY     • JOINT REPLACEMENT      RTKR 2014, partial left knee 2008   • REVISION TOTAL KNEE ARTHROPLASTY Right 01/2020    Dr. Harman   • VARICOCELECTOMY  1985   • VASECTOMY       Allergies   Allergen Reactions   • Cashew Nut    • Hydromorphone      Other reaction(s): Urticaria   • [place] Flavor      Other reaction(s):  shock, vasovagal syncope  mangos    • Pistachio Nut    • Sunflower Seed Angioedema     Social History     Socioeconomic History   • Marital status:      Spouse name: Marisela    • Number of children: 3   • Years of education: None   • Highest education level: None   Occupational History   • Occupation:  - Retired     Comment: 3 days week    Tobacco Use   • Smoking status: Never   • Smokeless tobacco: Never   Vaping Use   • Vaping Use: Never used   Substance and Sexual Activity   • Alcohol use: Yes     Alcohol/week: 6.0 - 9.0 standard drinks of alcohol     Types: 6 - 9 Shots of liquor per week   • Drug use: No   • Sexual activity: Yes     Social Determinants of Health     Financial Resource Strain: Low Risk  (10/19/2022)    Overall Financial Resource Strain (CARDIA)    • Difficulty of Paying Living Expenses: Not hard at all   Food Insecurity: No Food Insecurity (10/19/2022)    Hunger Vital Sign    • Worried About Running Out of Food in the Last Year: Never true    • Ran Out of Food in the Last Year: Never true   Transportation Needs: No Transportation Needs (10/19/2022)    PRAPARE - Transportation    • Lack of Transportation (Medical): No    • Lack of Transportation (Non-Medical): No   Physical Activity: Sufficiently Active (10/19/2022)    Exercise Vital Sign    • Days of Exercise per Week: 6 days    • Minutes of Exercise per Session: 60 min     Family History   Problem Relation Age of Onset   • Arthritis Biological Mother    • Valvular heart disease Biological  Mother    • COPD Biological Father    • Arthritis Biological Sister    • Arthritis Biological Brother    • Schizophrenia Biological Brother    • No Known Problems Biological Daughter    • No Known Problems Biological Daughter    • No Known Problems Biological Sister    • No Known Problems Biological Son        Current Outpatient Medications:   •  aspirin 81 mg chewable tablet, Take 1 tablet (81 mg total) by mouth daily. For 4 weeks to prevent blood clots., Disp: 60 tablet, Rfl: 0  •  azelastine (ASTELIN) 137 mcg (0.1 %) nasal spray, Administer 2 sprays into each nostril 2 (two) times a day. Use in each nostril as directed., Disp: 30 mL, Rfl: 6  •  B-complex with vitamin C tablet, Take 1 tablet by mouth daily. , Disp: , Rfl:   •  cetirizine 10 mg tablet, Take 10 mg by mouth daily., Disp: , Rfl:   •  EPINEPHrine (EPIPEN) 0.3 mg/0.3 mL injection syringe, Inject 0.3 mL (0.3 mg total) into the thigh as needed for anaphylaxis., Disp: 2 each, Rfl: 1  •  escitalopram (LEXAPRO) 20 mg tablet, Take 1 tablet (20 mg total) by mouth daily., Disp: 90 tablet, Rfl: 3  •  MULTIVITAMIN ORAL, Take 1 tablet by mouth daily.  , Disp: , Rfl:   •  omeprazole (PriLOSEC) 20 mg capsule, Take 20 mg by mouth every other day. Take 30 minutes prior to a meal. , Disp: , Rfl:   •  amoxicillin (AMOXIL) 500 mg tablet, Take 4 pills one hour prior to procedure (Patient not taking: Reported on 1/19/2023), Disp: 4 tablet, Rfl: 1  •  scopolamine (TRANSDERM-SCOP) 1 mg over 3 days transdermal patch, Place 1 patch on the skin every 3rd (third) day. (Patient not taking: Reported on 1/19/2023), Disp: 5 patch, Rfl: 0  Immunization History   Administered Date(s) Administered   • INFLUENZA VACCINE QUAD ADJUVANTED 65 and OLDER 10/09/2020, 10/13/2021   • Influenza Split Preservative Free ID 12/19/2012   • Influenza TIV (IM) 11/03/2010   • Influenza Vaccine 65 And Older Preservative Free 10/16/2019   • Influenza Vaccine High Dose 65 And Older 09/21/2022   • Influenza  "Vaccine Quadrivalent 3 Yr And Older 11/11/2015, 09/25/2018   • Influenza Vaccine Quadrivalent Preservative Free 6-35 Months 09/27/2013, 11/21/2014, 10/10/2016   • Influenza, Unspecified 11/21/2014, 10/10/2016, 11/06/2017   • Pneumococcal Conjugate 13-Valent 11/30/2018   • Pneumococcal Polysaccharide 10/09/2020   • SARS-COV-2 (COVID-19) VACCINE PFIZER BIVALENT 12 years and older ([place]) 09/21/2022   • SARS-COV-2 (COVID-19) VACCINE, MODERNA MONOVALENT 02/25/2021, 03/25/2021, 11/10/2021   • Tdap 03/03/2011, 08/06/2021   • Zoster 11/21/2014   • Zoster Vaccine Recombinant Adjuvanted (Shingrix) 12/11/2020, 03/11/2021     Immunization status: up to date and documented.      Review of Systems:  A Full 14  point review of systems was obtained and is negative or otherwise stated in the HPI.      Physical Examination:  This is a well-developed well-nourished 67 y.o.year old male in no acute distress.  Vital Signs:   Visit Vitals  /80   Pulse 88   Ht 1.778 m (5' 10\")   Wt 127 kg (281 lb)   SpO2 97%   BMI 40.32 kg/m²     HEENT:  Normocephalic, atraumatic.  PERRLA. There is no nasal mucosal inflammation or pharyngitis.  There is no evidence of thrush.  Severe crowding of the posterior oropharynx with macroglossia greatest at the base.  Eyes:  Sclera anicteric, conjunctiva pink, pupils equal and reactive to light  Neck: no adenopathy, no JVD  Chest:  Lungs clear to auscultation and percussion bilaterally, no crackles, rubs, rhonchi or wheezing.  Cor:  RR, normal S1 and S2, no murmurs, gallops or rubs.   There is no accentuated P2 or right ventricular heave.  Abd: Soft Nontender, nondistended, normal bowel sounds.  There is no hepatosplenomegaly.  Extremities: no clubbing, cyanosis or edema.  Skin: no rash, jaundice or petechiae.  The skin is otherwise intact.  Neuro: alert and oriented with no focal deficits.  Lymph nodes:There is no palpable peripheral  lymphadenopathy.  Joints: no deformities, no warmth or " erythema.  Psych: normal affect, good eye contact.      Diagnostic Data:  I have independently reviewed the physiologic sleep data including efficacy and compliance from ResMed S 10 AutoSet 5-20 cm H2O with EPR level 3, full-time in standard response.  He achieves 8 hours and 48 minutes average per night.  Maximum and 95th percentile pressures are below the maximum set pressure.  At median pressure of 7.2 cm H2O there is negligible leak of 9.3 L/min.  These pressures are sufficient to eliminate all significant apneas and hypopneas with AHI of 1.1 events per hour.  There are no treatment-emergent central apneic episodes, Cheyne-Acevedo respirations or other pathologic sleep.  Approximately 20 minutes time was spent addressing this.    Labs:  I have personally reviewed all pertinent patient laboratory results.  ABG Results    No lab values to display.       CBC Results       10/25/22 11/01/21 10/20/20     0901 1016 0924    WBC 5.27 4.94 4.7    RBC 4.93 4.88 4.99    HGB 14.4 14.1 14.4    HCT 44.0 44.2 44.2    MCV 89.2 90.6 89    MCH 29.2 28.9 28.9    MCHC 32.7 31.9 32.6     253 277        BMP Results       10/25/22 11/01/21 10/20/20     0901 1016 0924     142 140    K 4.5 4.8 4.9    Cl 105 106 106    CO2 24 25 22    Glucose 97 93 102    BUN 19 14 17    Creatinine 1.0 1.0 0.88    Calcium 9.1 9.0 --    Anion Gap 10 11 --    EGFR >60.0 >60.0 89       103        PFT: Repeat spirometric data performed today is good technical study.  Spirograms tail.  Forced vital capacity 3.86 L (87% predicted).  FEV1 2.99 L (91% predicted).  FEV1 percent 74%.  FEF 25 to 75%, 99% predicted.  Flow volume loops are normal.  Impression: This is a normal study.  No previous studies are presently available for comparison.      Imaging:  I have independently reviewed all available pertinent imaging.      Assessment:   #1. Moderate to severe obstructive sleep apnea with nocturnal hypoxemia with risk factors that include crowding of the  posterior oropharynx and increased nuchal and truncal body mass.  The application of CPAP is sufficient to eliminate apnea and hypoxemia and restore sleep to virtually normal architecture.  With excellent compliance he has had resolution of symptoms with no new problems or complications.    #2.  Allergic rhinitis/postnasal drip; this has been reasonably well controlled with combination intranasal steroids and topical H1 receptor antagonists.     Plan:  #1.  Continue optimal compliance with auto CPAP again trying to achieve 8 hours of use per night.  #2.  He will generally continue with P 10 nasal interface.  #3.    If he develops nasal irritation then it is reasonable to switch back to a full facemask temporarily until the irritation resolves.  #4.  Continue optimizing sleep hygiene, maintaining optimal body mass index with  judicious but definite weight loss, avoiding sleeping supine, limiting alcohol and sedatives, limiting caffeine to before noon and absolutely not operating machinery especially a motor vehicle if feeling fatigued.  #5.  Continue present nasal strategy for now.  #6.  Continue with annual seasonal flu vaccine as well as all other appropriate immunizations.  #7.  Continue appropriate cleaning of his equipment as well as regular replacement of tubing, filters and interface.  #8.  Otherwise continue the present plan for now.    I have discussed my findings concerns and recommendations at length with Deric.  All questions were asked and answered. Evaluation of supplies and equipment were performed. Education and instructions have been provided.  Follow-up has been arranged.  I will keep you apprised of my findings.  Please let me know if you have any questions.      Many thanks. Best regards.    Sincerely,    Hugo Pozo MD    7/11/2023    This letter was generated using speech recognition software.  Please excuse any typographical errors.

## 2023-09-07 DIAGNOSIS — E78.49 OTHER HYPERLIPIDEMIA: Primary | ICD-10-CM

## 2023-09-07 DIAGNOSIS — R73.09 ELEVATED GLUCOSE: ICD-10-CM

## 2023-09-07 DIAGNOSIS — R35.0 URINARY FREQUENCY: ICD-10-CM

## 2023-09-07 NOTE — TELEPHONE ENCOUNTER
Medicine Refill Request    Last Office Visit: 1/17/2023   Last Consult Visit: Visit date not found  Last Telemedicine Visit: 7/23/2021 Tom Douglas MD    Next Appointment: 10/23/2023      Current Outpatient Medications:     amoxicillin (AMOXIL) 500 mg tablet, Take 4 pills one hour prior to procedure (Patient not taking: Reported on 1/19/2023), Disp: 4 tablet, Rfl: 1    aspirin 81 mg chewable tablet, Take 1 tablet (81 mg total) by mouth daily. For 4 weeks to prevent blood clots., Disp: 60 tablet, Rfl: 0    azelastine (ASTELIN) 137 mcg (0.1 %) nasal spray, Administer 2 sprays into each nostril 2 (two) times a day. Use in each nostril as directed., Disp: 30 mL, Rfl: 6    B-complex with vitamin C tablet, Take 1 tablet by mouth daily. , Disp: , Rfl:     cetirizine 10 mg tablet, Take 10 mg by mouth daily., Disp: , Rfl:     EPINEPHrine (EPIPEN) 0.3 mg/0.3 mL injection syringe, Inject 0.3 mL (0.3 mg total) into the thigh as needed for anaphylaxis., Disp: 2 each, Rfl: 1    escitalopram (LEXAPRO) 20 mg tablet, Take 1 tablet (20 mg total) by mouth daily., Disp: 90 tablet, Rfl: 3    MULTIVITAMIN ORAL, Take 1 tablet by mouth daily.  , Disp: , Rfl:     omeprazole (PriLOSEC) 20 mg capsule, Take 20 mg by mouth every other day. Take 30 minutes prior to a meal. , Disp: , Rfl:     scopolamine (TRANSDERM-SCOP) 1 mg over 3 days transdermal patch, Place 1 patch on the skin every 3rd (third) day. (Patient not taking: Reported on 1/19/2023), Disp: 5 patch, Rfl: 0      BP Readings from Last 3 Encounters:   07/11/23 124/80   01/19/23 120/78   01/17/23 118/78       Recent Lab results:  Lab Results   Component Value Date    CHOL 191 10/25/2022   ,   Lab Results   Component Value Date    HDL 64 10/25/2022   ,   Lab Results   Component Value Date    LDLCALC 115 (H) 10/25/2022   ,   Lab Results   Component Value Date    TRIG 58 10/25/2022        Lab Results   Component Value Date    GLUCOSE 97 10/25/2022   ,   Lab Results   Component  Value Date    HGBA1C 5.4 01/05/2016         Lab Results   Component Value Date    CREATININE 1.0 10/25/2022       Lab Results   Component Value Date    TSH 1.31 10/25/2022           Lab Results   Component Value Date    HGBA1C 5.4 01/05/2016

## 2023-09-08 RX ORDER — ESCITALOPRAM OXALATE 20 MG/1
20 TABLET ORAL DAILY
Qty: 90 TABLET | Refills: 3 | Status: SHIPPED | OUTPATIENT
Start: 2023-09-08

## 2023-09-08 NOTE — TELEPHONE ENCOUNTER
Patient comment: [facility] is back ordered on med.  can you call in 30 day 1 time rx to the [facility] pharm indicated?    Will resend med to [facility] per patient's request.

## 2023-09-30 LAB
ALBUMIN SERPL-MCNC: 4 G/DL (ref 3.9–4.9)
ALBUMIN/GLOB SERPL: 1.7 {RATIO} (ref 1.2–2.2)
ALP SERPL-CCNC: 87 IU/L (ref 44–121)
ALT SERPL-CCNC: 18 IU/L (ref 0–44)
AST SERPL-CCNC: 15 IU/L (ref 0–40)
BASOPHILS # BLD AUTO: 0 X10E3/UL (ref 0–0.2)
BASOPHILS NFR BLD AUTO: 1 %
BILIRUB SERPL-MCNC: 0.3 MG/DL (ref 0–1.2)
BUN SERPL-MCNC: 19 MG/DL (ref 8–27)
BUN/CREAT SERPL: 22 (ref 10–24)
CALCIUM SERPL-MCNC: 9.2 MG/DL (ref 8.6–10.2)
CHLORIDE SERPL-SCNC: 105 MMOL/L (ref 96–106)
CHOLEST SERPL-MCNC: 181 MG/DL (ref 100–199)
CO2 SERPL-SCNC: 24 MMOL/L (ref 20–29)
CREAT SERPL-MCNC: 0.87 MG/DL (ref 0.76–1.27)
EGFRCR SERPLBLD CKD-EPI 2021: 93 ML/MIN/1.73
EOSINOPHIL # BLD AUTO: 0.3 X10E3/UL (ref 0–0.4)
EOSINOPHIL NFR BLD AUTO: 5 %
ERYTHROCYTE [DISTWIDTH] IN BLOOD BY AUTOMATED COUNT: 13.1 % (ref 11.6–15.4)
GLOBULIN SER CALC-MCNC: 2.4 G/DL (ref 1.5–4.5)
GLUCOSE SERPL-MCNC: 97 MG/DL (ref 70–99)
HBA1C MFR BLD: 5.7 % (ref 4.8–5.6)
HCT VFR BLD AUTO: 43.5 % (ref 37.5–51)
HDLC SERPL-MCNC: 65 MG/DL
HGB BLD-MCNC: 14.5 G/DL (ref 13–17.7)
IMM GRANULOCYTES # BLD AUTO: 0 X10E3/UL (ref 0–0.1)
IMM GRANULOCYTES NFR BLD AUTO: 1 %
LDLC SERPL CALC-MCNC: 96 MG/DL (ref 0–99)
LYMPHOCYTES # BLD AUTO: 1.8 X10E3/UL (ref 0.7–3.1)
LYMPHOCYTES NFR BLD AUTO: 30 %
MCH RBC QN AUTO: 28.8 PG (ref 26.6–33)
MCHC RBC AUTO-ENTMCNC: 33.3 G/DL (ref 31.5–35.7)
MCV RBC AUTO: 86 FL (ref 79–97)
MONOCYTES # BLD AUTO: 0.7 X10E3/UL (ref 0.1–0.9)
MONOCYTES NFR BLD AUTO: 12 %
NEUTROPHILS # BLD AUTO: 3.1 X10E3/UL (ref 1.4–7)
NEUTROPHILS NFR BLD AUTO: 51 %
PLATELET # BLD AUTO: 253 X10E3/UL (ref 150–450)
POTASSIUM SERPL-SCNC: 4.8 MMOL/L (ref 3.5–5.2)
PROT SERPL-MCNC: 6.4 G/DL (ref 6–8.5)
PSA SERPL-MCNC: 0.7 NG/ML (ref 0–4)
RBC # BLD AUTO: 5.04 X10E6/UL (ref 4.14–5.8)
SODIUM SERPL-SCNC: 141 MMOL/L (ref 134–144)
TRIGL SERPL-MCNC: 115 MG/DL (ref 0–149)
TSH SERPL DL<=0.005 MIU/L-ACNC: 1.56 UIU/ML (ref 0.45–4.5)
VLDLC SERPL CALC-MCNC: 20 MG/DL (ref 5–40)
WBC # BLD AUTO: 6 X10E3/UL (ref 3.4–10.8)

## 2023-10-23 ENCOUNTER — OFFICE VISIT (OUTPATIENT)
Dept: PRIMARY CARE | Facility: CLINIC | Age: 68
End: 2023-10-23
Payer: MEDICARE

## 2023-10-23 VITALS
RESPIRATION RATE: 16 BRPM | SYSTOLIC BLOOD PRESSURE: 122 MMHG | BODY MASS INDEX: 41.38 KG/M2 | HEART RATE: 92 BPM | TEMPERATURE: 97.3 F | DIASTOLIC BLOOD PRESSURE: 88 MMHG | WEIGHT: 295.6 LBS | HEIGHT: 71 IN | OXYGEN SATURATION: 96 %

## 2023-10-23 DIAGNOSIS — R73.01 IFG (IMPAIRED FASTING GLUCOSE): Chronic | ICD-10-CM

## 2023-10-23 DIAGNOSIS — Z00.00 MEDICARE ANNUAL WELLNESS VISIT, SUBSEQUENT: Primary | ICD-10-CM

## 2023-10-23 PROBLEM — J01.90 ACUTE NON-RECURRENT SINUSITIS: Status: RESOLVED | Noted: 2018-11-30 | Resolved: 2023-10-23

## 2023-10-23 PROCEDURE — G0439 PPPS, SUBSEQ VISIT: HCPCS | Performed by: FAMILY MEDICINE

## 2023-10-23 PROCEDURE — 90694 VACC AIIV4 NO PRSRV 0.5ML IM: CPT | Performed by: FAMILY MEDICINE

## 2023-10-23 PROCEDURE — G0008 ADMIN INFLUENZA VIRUS VAC: HCPCS | Performed by: FAMILY MEDICINE

## 2023-10-23 RX ORDER — ESCITALOPRAM OXALATE 10 MG/1
10 TABLET ORAL DAILY
Qty: 90 TABLET | Refills: 3 | Status: SHIPPED | OUTPATIENT
Start: 2023-10-23

## 2023-10-23 SDOH — HEALTH STABILITY: PHYSICAL HEALTH: ON AVERAGE, HOW MANY DAYS PER WEEK DO YOU ENGAGE IN MODERATE TO STRENUOUS EXERCISE (LIKE A BRISK WALK)?: 2 DAYS

## 2023-10-23 SDOH — HEALTH STABILITY: PHYSICAL HEALTH: ON AVERAGE, HOW MANY MINUTES DO YOU ENGAGE IN EXERCISE AT THIS LEVEL?: 60 MIN

## 2023-10-23 SDOH — HEALTH STABILITY: PHYSICAL HEALTH: ON AVERAGE, HOW MANY MINUTES DO YOU ENGAGE IN EXERCISE AT THIS LEVEL?: 30 MIN

## 2023-10-23 SDOH — ECONOMIC STABILITY: FOOD INSECURITY: WITHIN THE PAST 12 MONTHS, YOU WORRIED THAT YOUR FOOD WOULD RUN OUT BEFORE YOU GOT MONEY TO BUY MORE.: NEVER TRUE

## 2023-10-23 SDOH — ECONOMIC STABILITY: FOOD INSECURITY: WITHIN THE PAST 12 MONTHS, THE FOOD YOU BOUGHT JUST DIDN'T LAST AND YOU DIDN'T HAVE MONEY TO GET MORE.: NEVER TRUE

## 2023-10-23 SDOH — HEALTH STABILITY: PHYSICAL HEALTH: ON AVERAGE, HOW MANY DAYS PER WEEK DO YOU ENGAGE IN MODERATE TO STRENUOUS EXERCISE (LIKE A BRISK WALK)?: 7 DAYS

## 2023-10-23 ASSESSMENT — LIFESTYLE VARIABLES
HOW OFTEN DO YOU HAVE A DRINK CONTAINING ALCOHOL: 2-3 TIMES A WEEK
HOW OFTEN DO YOU HAVE SIX OR MORE DRINKS ON ONE OCCASION: NEVER
HOW MANY STANDARD DRINKS CONTAINING ALCOHOL DO YOU HAVE ON A TYPICAL DAY: 1 OR 2

## 2023-10-23 ASSESSMENT — ENCOUNTER SYMPTOMS
FREQUENCY: 0
CONSTIPATION: 0
RHINORRHEA: 0
ABDOMINAL PAIN: 0
DIZZINESS: 0
DYSURIA: 0
FLANK PAIN: 0
EYE DISCHARGE: 0
NERVOUS/ANXIOUS: 0
NECK STIFFNESS: 0
CHILLS: 0
DYSPHORIC MOOD: 0
SORE THROAT: 0
EYE REDNESS: 0
BLOOD IN STOOL: 0
DIARRHEA: 0
HEADACHES: 0
SINUS PRESSURE: 0
WHEEZING: 0
UNEXPECTED WEIGHT CHANGE: 0
NECK PAIN: 0
SINUS PAIN: 0
SLEEP DISTURBANCE: 0
ARTHRALGIAS: 0
BRUISES/BLEEDS EASILY: 0
WEAKNESS: 0
BACK PAIN: 0
TROUBLE SWALLOWING: 0
EYE PAIN: 0
APPETITE CHANGE: 0
NUMBNESS: 0
NAUSEA: 0
DIFFICULTY URINATING: 0
CONFUSION: 0
FEVER: 0
COUGH: 0
FATIGUE: 0
CHEST TIGHTNESS: 0
MYALGIAS: 0
LIGHT-HEADEDNESS: 0
HEMATURIA: 0
SPEECH DIFFICULTY: 0
SHORTNESS OF BREATH: 0
VOMITING: 0

## 2023-10-23 ASSESSMENT — MINI COG
TOTAL SCORE: 5
COMPLETED: YES

## 2023-10-23 ASSESSMENT — PATIENT HEALTH QUESTIONNAIRE - PHQ9: SUM OF ALL RESPONSES TO PHQ9 QUESTIONS 1 & 2: 1

## 2023-10-23 ASSESSMENT — SOCIAL DETERMINANTS OF HEALTH (SDOH): HOW HARD IS IT FOR YOU TO PAY FOR THE VERY BASICS LIKE FOOD, HOUSING, MEDICAL CARE, AND HEATING?: NOT HARD AT ALL

## 2023-10-23 NOTE — ASSESSMENT & PLAN NOTE
· The patient is currently stable.   · Bloodwork was up to date.   · The patient should continue to exercise at 70 percent of his maximal heart rate for 2.5 hours per week.   · All the recommend vaccinations are not up to date. FLU shot given today. Recommend COVID shot at pharmacy.   · The patient should be evaluated by the ophthalmologist every 6 monthss and dentist every 6 months.   · The patient was advised to protect the skin by applying suntan lotion containing an SPF > 30 every 2 hours while in sun or after swimming. Instructed to avoid prolonged direct sun exposure as much as possible.   · The patient's body mass index is elevated. The patient was instructed to lose weight through diet and exercise to achieve a body mass index < 25.   · The patient's colonoscopy is scheudled  · Advised to consume 1000 mg of calcium daily through the diet. If the patient is unable to consume 1000 mg through the diet, then taking calcium supplements is an acceptable alternative. The patient was informed not to consume more than 500 mg of a calcium supplement at a time.

## 2023-10-23 NOTE — PATIENT INSTRUCTIONS
Men's Personalized Prevention Plan Services (PPPS)         Preventive Services Checklist (Assumes Average Risk Unless Otherwise Noted)  Preventive Service Target Population and Frequency Last Done Date Due   Abdominal Aortic Aneurysm Screening Any 1 risk factor: 1) family history of AAA or 2) 65-74yo and smoked 100+ cigarettes in a lifetime (covered once)   Not needed    Alcohol Misuse Screening As necessary for those at risk (covered annually) 10/23/2023 10/2024   Cholesterol Screening As necessary >=34yo; 20-34yo if increased risk coronary artery disease (covered every 5 years)  09/09/2023 09/2024   Colorectal Cancer Screening >=49yo: Colonoscopy every 10 years, FIT annually or Cologuard every 3 years (up to 86yo for Cologuard) 09/14/2018 Due at this time    Diabetes Screening Any 1 risk factor: hypertension, dyslipidemia, obesity, high glucose; or Any 2 risk factors: >=64 yo, overweight, family history diabetes (covered every 6 months) 09/09/2023 09/2024   Glaucoma Screening Any 1 risk factor: 1) diabetes, 2) family history glaucoma, 3)  >=49yo, 4)  American >=66yo (covered annually) 009/2023 Every 6 months    Hepatitis C Screening Any 1 risk factor: 1) born between 4711-5375, 2) blood transfusion before 1992, 3) current or past injection drug use (covered once for average risk, annually for high risk) 03/16/2018 Done    Lung Cancer Screening Low-dose chest CT if all 3 risk factors: 1) 55-78yo, 2) smoker or quit within last 15y, 3) >=30 pack years (covered annually)   Not needed    Prostate Cancer Screening 55-69 years old if patient desires test after weighing potential harms and benefits (covered annually) 09/09/2023 09/2024   Sexually Transmitted Diseases (STDs) As necessary chlamydia, gonorrhea, syphilis, hepatitis B (covered annually)  HIV if any 1 risk factor present: 1) <14yo or >66yo and at increased risk or 2) 15-66yo and ask for it (covered annually)    Low Risk    Vaccine:  "Hepatitis B As necessary if medium or high risk (series covered once)   Not needed    Vaccine: Influenza All without contraindications (covered annually.) 10/23/2023 Fall 2024   Vaccine: Pneumococcal Pneumovax + Prevnar (both covered, >=11 months apart) Prevnar   11/30/2018  Pneumovax  10/09/2020  Done    Vaccine: Shingrix (Shingles) >= 49yo without contraindications   (2 doses, 2 months apart) Zostavax   11/21/2014  Shingrix  12/11/2020 03/11/2021 Done    TDAP Due every 10 years  08/06/2021 08/2031                                           Men's Personalized Prevention Plan Services (PPPS)                                           Health Risk Factors and Interventions  \"x\" Indicates risk is associated with this patient  Risk Factor             x Overweight Low fat diet, exercise, and weight loss      N/A  Hypertension     N/A  Diabetes     N/A  Fall Risk     N/A  Tobacco Use                   Problem List Items Addressed This Visit       IFG (impaired fasting glucose) (Chronic)     A1C is 5.7. Recommend low carbohydrate diet and low concentrated sweet diet. Reduce weight through exercise and diet.         Medicare annual wellness visit, subsequent - Primary     The patient is currently stable.   Bloodwork was up to date.   The patient should continue to exercise at 70 percent of his maximal heart rate for 2.5 hours per week.   All the recommend vaccinations are not up to date. FLU shot given today. Recommend COVID shot at pharmacy.   The patient should be evaluated by the ophthalmologist every 6 monthss and dentist every 6 months.   The patient was advised to protect the skin by applying suntan lotion containing an SPF > 30 every 2 hours while in sun or after swimming. Instructed to avoid prolonged direct sun exposure as much as possible.   The patient's body mass index is elevated. The patient was instructed to lose weight through diet and exercise to achieve a body mass index < 25.   The patient's colonoscopy is " hilary  Advised to consume 1000 mg of calcium daily through the diet. If the patient is unable to consume 1000 mg through the diet, then taking calcium supplements is an acceptable alternative. The patient was informed not to consume more than 500 mg of a calcium supplement at a time.                  Health Risk Factors with Personalized Education:  ----------------------------------------------------------------------------------------------------------------------  Stress management:  Understanding Your Stress  Think about how you know when youre stressed.  Think about how your thoughts or behaviors are different when youre stressed.  Think about what triggers stress for you.  Think about how you handle stress, and whether youre making unhealthy choices in response to stress (like smoking, drinking alcohol or overeating).  Managing Stress  Take a break from the stressful situation.  Reduce your stress levels by exercising, talking with family/friends, ensuring adequate sleep.  Consider meditation or yoga.  Make sure you plan time to do things you enjoy.  Let your PCP know if youre having problems limiting your stress.  ----------------------------------------------------------------------------------------------------------------------  Controlling Your Cholesterol  Reduce the amount of saturated and trans fat in your diet.  Limit intake of red meat.  Consume only low-fat or non-fat/skim dairy.  Limit fried food.  Cook with vegetable oils.  Reduce your intake of sugary foods, sugary drinks and alcohol.  Eat a diet high in fruit, vegetables and whole grains.  Get protein from fish, poultry and a small portion of nuts.  Stay active.  Try to get at least 90 to 150 minutes of exercise per week.  Try brisk walking, swimming, bicycling or dancing.  Maintain a healthy weight by balancing your diet and exercise.  If you have been prescribed medication, take it regularly and exactly as prescribed. Let your PCP know  if you have any problems or questions about your medication.  Its important to know your cholesterol numbers.  When recommended by your PCP, get the cholesterol blood test.  ----------------------------------------------------------------------------------------------------------------------  Reducing Your Risk of Falls  Tell your PCP if any of your medications make you feel tired, dizzy, lightheaded or off-balance.  Maintain coordination, flexibility and balance by ensuring regular physical activity.  Limit alcohol intake to 1 drink per day.  Consider avoiding all alcohol intake.  Ensure good vision.  Visit an ophthalmologist or optometrist regularly for vision screening or to make sure your glasses / contact lens prescription is correct.  If you need glasses or contacts, wear them.  When you get new glasses or contacts, take time to get used to them.  Do not wear sunglasses or tinted lenses when indoors.  Ensure good hearing.  Have your hearing checked if you are having trouble hearing, or family and friends think you cannot hear them.  If you need a hearing aid, be sure it fits well and wear it.  Get enough rest.  Ensure about 7-9 hours of sleep every day.  Get up slowly from your bed or chairs.  Do not start walking until you are sure you feel steady.  Wear non-skid, rubber-soled, low-heeled shoes.  Do not walk in socks, or in shoes and slippers with smooth soles.  If your PCP or therapist recommends using a cane or walker, use it regularly.  Make your home safer.  Increase lighting throughout the house, especially at the top and bottom of stairs.  Ensure lighting is easily turned on when getting up in the middle of the night.  Make sure there are two secure rails on all stairs.  Install grab bars in the bathtub / shower and near the toilet.  Consider using a shower chair and / or a hand-held shower.  Spread sand or salt on icy surfaces.  Beware of wet surfaces, which can be icy.  Tell your PCP if you have  fallen.

## 2023-10-23 NOTE — ASSESSMENT & PLAN NOTE
A1C is 5.7. Recommend low carbohydrate diet and low concentrated sweet diet. Reduce weight through exercise and diet.

## 2023-10-23 NOTE — PROGRESS NOTES
Subjective     Deric Ray is a 68 y.o. male who presents for a subsequent annual wellness visit.     Patient Care Team:  Tom Douglas MD as PCP - General    Comprehensive Medical and Social History  Patient Active Problem List   Diagnosis    Attention deficit disorder (ADD) without hyperactivity    Allergic rhinitis    Polyp of colon    Depression    GERD (gastroesophageal reflux disease)    COURT (obstructive sleep apnea)    Osteoarthritis of knee    Medicare annual wellness visit, subsequent    Carpal tunnel syndrome    Diverticulosis    Other hyperlipidemia    IFG (impaired fasting glucose)    Obesity, morbid (CMS/HCC)    Perianal dermatitis     Past Medical History:   Diagnosis Date    Allergic rhinitis     Controlled with zyrtec and Fluticasone nasal spray as needed. Worse in spring and fall.    Attention deficit disorder (ADD) in adult     Managed on Straterra in the past but not effective.    Carpal tunnel syndrome 05/30/2018    Neurologist: Dr. Truong. EMG in 05/2018 with bilateral median neuropathy with right severe and left mild to moderate. Being treated with wrist splints.    Complaint of nasal congestion     Dr Douglas prescribed Augmentin last day 1/16/20    COVID-19 11/2022    Depression 03/07/2018    Controlled with Escitalopram.     Diverticulosis 09/17/2018    Seen in the sigmoid colon during colonoscopy in 09/2018 and no history of diverticuliitis.     GERD (gastroesophageal reflux disease)     Managed on Omeprazole. Should take medication about 30 minutes prior to meal.  The patient should avoid caffeinated products. The patient should avoid carbonated beverages. The patient was instructed not to eat within 3 hours of bedtime. Pt instructed to avoid fatty meals. Suggested weight reduction.    IFG (impaired fasting glucose) 10/21/2020    Advised to follow a low carbohydrate diet and to reduce BMI < 25.  Advised to exercise for at least 2.5 hours per week.      COURT on CPAP      Pulmonologist: Dr. Pulido. Managed with CPAP at 10 CM H2O    Osteoarthritis of both knees     Orthopedic surgeon: Dr. Cheung. S/P left knee hemiarthroplasty in 2007. X-rays in 12/2014 reveal bilateral small effusions, advanced DJD left knee and mild right knee DJD. MRI of right knee (3/2015) with severe medial compartment osteoarthrosis with subchondral fracture of medial femoral condyle with associated osteochondral fracture posteriorly. Has medial femoral condyle avascular necrosis.    Other hyperlipidemia 10/21/2020    Managed with diet. Requires periodic monitoring of lipids and liver function tests.  Advised to follow low fat diet and to keep BMI < 25.  Advised to exercise for 2.5 hours per week.      Polyp of colon 03/07/2018    Gastroenterologist: Dr. Dunaway. S/P colonoscopy with polypectomy in 2004. Colonoscopy with polypectomy x 7 (5 tubular adenomas and 2 hyperplastic polyps) on 11/5/2014. Colonoscopy in 09/2018 with polypectomy x 5 (Tubular adenoma, sessile serrated adenoma and hyperplastic polyp). Next due in 09/2023.      Past Surgical History:   Procedure Laterality Date    BONE CYST EXCISION Left     Left humeral bone cyst excision    COLONOSCOPY W/ POLYPECTOMY      JOINT REPLACEMENT      RTKR 2014, partial left knee 2008    REVISION TOTAL KNEE ARTHROPLASTY Right 01/2020    Dr. Harman    VARICOCELECTOMY  1985    VASECTOMY       Allergies   Allergen Reactions    Cashew Nut     Hydromorphone      Other reaction(s): Urticaria    Barrington Flavor      Other reaction(s):  shock, vasovagal syncope  mangos     Pistachio Nut     Sunflower Seed Angioedema     Current Outpatient Medications   Medication Sig Dispense Refill    amoxicillin (AMOXIL) 500 mg tablet Take 4 pills one hour prior to procedure 4 tablet 1    aspirin 81 mg chewable tablet Take 1 tablet (81 mg total) by mouth daily. For 4 weeks to prevent blood clots. 60 tablet 0    azelastine (ASTELIN) 137 mcg (0.1 %) nasal spray  Administer 2 sprays into each nostril 2 (two) times a day. Use in each nostril as directed. 30 mL 6    B-complex with vitamin C tablet Take 1 tablet by mouth daily.       cetirizine 10 mg tablet Take 10 mg by mouth daily.      EPINEPHrine (EPIPEN) 0.3 mg/0.3 mL injection syringe Inject 0.3 mL (0.3 mg total) into the thigh as needed for anaphylaxis. 2 each 1    escitalopram (LEXAPRO) 10 mg tablet Take 1 tablet (10 mg total) by mouth daily. 90 tablet 3    escitalopram (LEXAPRO) 20 mg tablet Take 1 tablet (20 mg total) by mouth daily. 90 tablet 3    omeprazole (PriLOSEC) 20 mg capsule Take 20 mg by mouth every other day. Take 30 minutes prior to a meal.        No current facility-administered medications for this visit.     Social History     Tobacco Use    Smoking status: Never    Smokeless tobacco: Never   Vaping Use    Vaping Use: Never used   Substance Use Topics    Alcohol use: Yes     Alcohol/week: 6.0 - 9.0 standard drinks of alcohol     Types: 6 - 9 Shots of liquor per week    Drug use: No     Family History   Problem Relation Age of Onset    Arthritis Biological Mother     Valvular heart disease Biological Mother     COPD Biological Father     Arthritis Biological Sister     Arthritis Biological Brother     Schizophrenia Biological Brother     No Known Problems Biological Daughter     No Known Problems Biological Daughter     No Known Problems Biological Sister     No Known Problems Biological Son      Review of Systems   Constitutional: Negative for appetite change, chills, fatigue, fever and unexpected weight change.   HENT: Negative for congestion, ear pain, hearing loss, nosebleeds, postnasal drip, rhinorrhea, sinus pressure, sinus pain, sneezing, sore throat, tinnitus and trouble swallowing.    Eyes: Negative for pain, discharge, redness and visual disturbance.   Respiratory: Negative for cough, chest tightness, shortness of breath and wheezing.    Cardiovascular: Negative for chest  "pain and leg swelling.   Gastrointestinal: Negative for abdominal pain, blood in stool, constipation, diarrhea, nausea and vomiting.   Endocrine: Negative for cold intolerance and heat intolerance.   Genitourinary: Negative for decreased urine volume, difficulty urinating, dysuria, flank pain, frequency, hematuria and urgency.   Musculoskeletal: Negative for arthralgias, back pain, gait problem, myalgias, neck pain and neck stiffness.   Skin: Negative for rash.   Allergic/Immunologic: Negative for environmental allergies.   Neurological: Negative for dizziness, syncope, speech difficulty, weakness, light-headedness, numbness and headaches.   Hematological: Does not bruise/bleed easily.   Psychiatric/Behavioral: Negative for behavioral problems, confusion, dysphoric mood and sleep disturbance. The patient is not nervous/anxious.         Increase anxiety         Objective   Vitals  Vitals:    10/23/23 1300   BP: 122/88   Pulse: 92   Resp: 16   Temp: 36.3 °C (97.3 °F)   TempSrc: Oral   SpO2: 96%   Weight: 134 kg (295 lb 9.6 oz)  Comment: with shoes   Height: 1.803 m (5' 11\")  Comment: with shoes     Body mass index is 41.23 kg/m².    Wt Readings from Last 3 Encounters:   10/23/23 134 kg (295 lb 9.6 oz)   07/11/23 127 kg (281 lb)   03/06/23 129 kg (285 lb)     Physical Exam  Constitutional:       General: He is not in acute distress.     Appearance: Normal appearance. He is well-developed. He is not ill-appearing, toxic-appearing or diaphoretic.   HENT:      Head: Normocephalic and atraumatic.      Right Ear: Tympanic membrane, ear canal and external ear normal.      Left Ear: Tympanic membrane, ear canal and external ear normal.      Nose: No congestion or rhinorrhea.      Mouth/Throat:      Pharynx: No oropharyngeal exudate or posterior oropharyngeal erythema.   Eyes:      General: Lids are normal.      Conjunctiva/sclera: Conjunctivae normal.      Pupils: Pupils are equal, round, and reactive to light.   Neck:      " Thyroid: No thyromegaly.      Vascular: No carotid bruit.   Cardiovascular:      Rate and Rhythm: Normal rate and regular rhythm.      Pulses:           Popliteal pulses are 2+ on the right side and 2+ on the left side.        Dorsalis pedis pulses are 2+ on the right side and 2+ on the left side.      Heart sounds: Normal heart sounds. No murmur heard.     No gallop.   Pulmonary:      Effort: Pulmonary effort is normal. No tachypnea, accessory muscle usage or respiratory distress.      Breath sounds: Normal breath sounds. No decreased breath sounds, wheezing, rhonchi or rales.   Abdominal:      General: Bowel sounds are normal. There is no distension.      Palpations: Abdomen is soft.      Tenderness: There is no abdominal tenderness. There is no guarding or rebound.      Hernia: No hernia is present.   Musculoskeletal:      Cervical back: Full passive range of motion without pain, normal range of motion and neck supple.      Right lower leg: No edema.      Left lower leg: No edema.   Lymphadenopathy:      Cervical: No cervical adenopathy.   Skin:     General: Skin is warm and dry.      Findings: No rash.   Neurological:      Mental Status: He is alert.   Psychiatric:         Mood and Affect: Mood normal. Mood is not anxious or depressed.         Speech: Speech normal.         Behavior: Behavior normal. Behavior is cooperative.         Thought Content: Thought content normal.         Judgment: Judgment normal.           Advanced Care Plan                                        PHQ  Will the patient answer the depression questions?: Yes (said he has been struggling)   Little interest or pleasure in doing things: Not at all   Feeling down, depressed, or hopeless: Several days   Depression Risk: 1                                             Mini Cog  Completed: Yes  Score: 5  Result: Negative      Get Up and Go  Result: Pass    STEADI Falls Risk  One or more falls in the last year: Yes   How many times: 1 (fell off  step stool 6 weeks ago bruising left foot bone spur)   Was the patient injured in any fall: No   Has trouble stepping up onto a curb: No   Advised to use a cane or walker to get around safely: No   Often has to rush to the toilet: No   Feels unsteady when walking: No   Has lost some feeling in feet: No   Often feels sad or depressed: Yes   Steadies self on furniture while walking at home: No   Takes medication that makes him/her feel lightheaded or more tired than usual: No   Worried about falling: No   Takes medicine to sleep or improve mood: Yes   Needs to push with hands when rising from a chair: No   Falls screen completed: Yes     Immunization History   Administered Date(s) Administered    INFLUENZA VACCINE QUAD ADJUVANTED 65 and OLDER 10/09/2020, 10/13/2021    Influenza Split Preservative Free ID 12/19/2012    Influenza TIV (IM) 11/03/2010    Influenza Vaccine 65 And Older Preservative Free 10/16/2019    Influenza Vaccine High Dose 65 And Older 09/21/2022    Influenza Vaccine Quadrivalent 3 Yr And Older 11/11/2015, 09/25/2018    Influenza Vaccine Quadrivalent Preservative Free 6-35 Months 09/27/2013, 11/21/2014, 10/10/2016    Influenza, Unspecified 11/21/2014, 10/10/2016, 11/06/2017    Pneumococcal Conjugate 13-Valent 11/30/2018    Pneumococcal Polysaccharide 10/09/2020    SARS-COV-2 (COVID-19) VACCINE PFIZER BIVALENT 12 years and older ([place]) 09/21/2022    SARS-COV-2 (COVID-19) VACCINE, MODERNA MONOVALENT 02/25/2021, 03/25/2021, 11/10/2021    SARS-COV-2 (COVID19) VACCINE, PFIZER MONOVALENT 09/21/2022    Tdap 03/03/2011, 08/06/2021    Zoster 11/21/2014    Zoster Vaccine Recombinant Adjuvanted (Shingrix) 12/11/2020, 03/11/2021       Health Maintenance Topics with due status: Overdue       Topic Date Due    Influenza Vaccine 08/01/2023    COVID-19 Vaccine 09/01/2023    Colorectal Cancer Screening 09/14/2023     Health Maintenance Topics with due status: Not Due       Topic Last Completion  Date    DTaP, Tdap, and Td Vaccines 08/06/2021    Medicare Annual Wellness Visit 10/23/2023    Depression Screening 10/23/2023    Falls Risk Screening 10/23/2023     Health Maintenance Topics with due status: Completed       Topic Last Completion Date    Hepatitis C Screening 03/06/2018    Pneumococcal (65 years and older) 10/09/2020    Zoster Vaccine 03/11/2021     Health Maintenance Topics with due status: Aged Out       Topic Date Due    Meningococcal ACWY Aged Out    HIB Vaccines Aged Out    Hepatitis B Vaccines Aged Out    IPV Vaccines Aged Out    HPV Vaccines Aged Out       Lab Results   Component Value Date    WBC 6.0 09/29/2023    WBC 5.27 10/25/2022    WBC 4.94 11/01/2021    HGB 14.5 09/29/2023    HGB 14.4 10/25/2022    HGB 14.1 11/01/2021    HCT 43.5 09/29/2023    HCT 44.0 10/25/2022    HCT 44.2 11/01/2021    MCV 86 09/29/2023    MCV 89.2 10/25/2022    MCV 90.6 11/01/2021     09/29/2023     10/25/2022     11/01/2021       Lab Results   Component Value Date    GLUCOSE 97 09/29/2023    GLUCOSE 97 10/25/2022    GLUCOSE 93 11/01/2021    CALCIUM 9.1 10/25/2022    CALCIUM 9.0 11/01/2021    CALCIUM 9.4 02/07/2020     09/29/2023     10/25/2022     11/01/2021    K 4.8 09/29/2023    K 4.5 10/25/2022    K 4.8 11/01/2021    CO2 24 09/29/2023    CO2 24 10/25/2022    CO2 25 11/01/2021     09/29/2023     10/25/2022     11/01/2021    BUN 19 09/29/2023    BUN 19 10/25/2022    BUN 14 11/01/2021    CREATININE 0.87 09/29/2023    CREATININE 1.0 10/25/2022    CREATININE 1.0 11/01/2021       Lab Results   Component Value Date    ALT 18 09/29/2023    ALT 20 10/25/2022    ALT 18 11/01/2021    AST 15 09/29/2023    AST 23 10/25/2022    AST 18 11/01/2021    ALKPHOS 87 09/29/2023    ALKPHOS 58 10/25/2022    ALKPHOS 57 11/01/2021    BILITOT 0.3 09/29/2023    BILITOT 0.6 10/25/2022    BILITOT 0.8 11/01/2021       Lab Results   Component Value Date    CHOL 181 09/29/2023    CHOL  191 10/25/2022    CHOL 183 11/01/2021     Lab Results   Component Value Date    HDL 65 09/29/2023    HDL 64 10/25/2022    HDL 62 11/01/2021     Lab Results   Component Value Date    LDLCALC 96 09/29/2023    LDLCALC 115 (H) 10/25/2022    LDLCALC 111 (H) 11/01/2021     Lab Results   Component Value Date    TRIG 115 09/29/2023    TRIG 58 10/25/2022    TRIG 49 11/01/2021       Lab Results   Component Value Date    PSA 0.7 09/29/2023    PSA 0.47 10/25/2022    PSA 0.59 11/01/2021       Lab Results   Component Value Date    TSH 1.560 09/29/2023    TSH 1.31 10/25/2022    TSH 0.55 11/01/2021       Lab Results   Component Value Date    HEPCAB <0.1 03/06/2018     Lab Results   Component Value Date    HGBA1C 5.7 (H) 09/29/2023    HGBA1C 5.4 01/05/2016     Lab Results   Component Value Date    LDLCALC 96 09/29/2023    CREATININE 0.87 09/29/2023         Hearing and Vision Screening  No results found.  See HRA for relevant hearing screening response.    Assessment/Plan   Diagnoses and all orders for this visit:    Medicare annual wellness visit, subsequent (Primary)  Assessment & Plan:  · The patient is currently stable.   · Bloodwork was up to date.   · The patient should continue to exercise at 70 percent of his maximal heart rate for 2.5 hours per week.   · All the recommend vaccinations are not up to date. FLU shot given today. Recommend COVID shot at pharmacy.   · The patient should be evaluated by the ophthalmologist every 6 monthss and dentist every 6 months.   · The patient was advised to protect the skin by applying suntan lotion containing an SPF > 30 every 2 hours while in sun or after swimming. Instructed to avoid prolonged direct sun exposure as much as possible.   · The patient's body mass index is elevated. The patient was instructed to lose weight through diet and exercise to achieve a body mass index < 25.   · The patient's colonoscopy is scheudled  · Advised to consume 1000 mg of calcium daily through the diet.  If the patient is unable to consume 1000 mg through the diet, then taking calcium supplements is an acceptable alternative. The patient was informed not to consume more than 500 mg of a calcium supplement at a time.           IFG (impaired fasting glucose)  Assessment & Plan:  A1C is 5.7. Recommend low carbohydrate diet and low concentrated sweet diet. Reduce weight through exercise and diet.      Other orders  -     Influenza vaccine 65 and older IM preservative free  -     escitalopram (LEXAPRO) 10 mg tablet; Take 1 tablet (10 mg total) by mouth daily.      See Patient Instructions (the written plan) which was given to the patient for PPPS and health risk factors with interventions.

## 2023-11-13 ENCOUNTER — TELEMEDICINE (OUTPATIENT)
Dept: PRIMARY CARE | Facility: CLINIC | Age: 68
End: 2023-11-13
Payer: MEDICARE

## 2023-11-13 DIAGNOSIS — U07.1 COVID-19: Primary | ICD-10-CM

## 2023-11-13 PROCEDURE — 99213 OFFICE O/P EST LOW 20 MIN: CPT | Mod: 95 | Performed by: FAMILY MEDICINE

## 2023-11-13 ASSESSMENT — ENCOUNTER SYMPTOMS
SINUS PAIN: 0
CHILLS: 0
NAUSEA: 0
FATIGUE: 0
ABDOMINAL PAIN: 0
SINUS PRESSURE: 0
SHORTNESS OF BREATH: 0
ARTHRALGIAS: 0
RHINORRHEA: 1
MYALGIAS: 0
CONFUSION: 0
EYE REDNESS: 0
NECK STIFFNESS: 0
CHEST TIGHTNESS: 0
COUGH: 1
VOMITING: 0
FEVER: 0
DIARRHEA: 0
SORE THROAT: 1
WHEEZING: 0

## 2023-11-13 NOTE — PROGRESS NOTES
Tom Douglas MD    [facility]  [address], [address]  [place] [address]  Phone: [phone]  Fax: [phone]     History of Present Illness     Subjective     Patient ID: Titus Ray is a 68 y.o. male.      Verification of Patient Location:  The patient affirms they are currently located in the following state:[place]     Request for Consent:  You and I are about to have a telemedicine check-in or visit. This is allowed because you are already my patient, and you have requested it.  This telemedicine visit will be billed to your health insurance or you, if you are self-insured.  You understand you will be responsible for any copayments or coinsurances that apply to your telemedicine visit.  Before starting our telemedicine visit, I am required to get your consent for this virtual check-in or visit by telemedicine. Do you consent?      Patient Response to Request for Consent: Yes      Request for Consent:   Audio and Video Encounter   Mariza, my name is Tmo Douglas MD.  Before we proceed, can you please verify your identification by telling me your full name and date of birth?  Can you tell me who is in the room with you?    You and I are about to have a telemedicine check-in or visit because you have requested it.  This is a live video-conference.  I am a real person, speaking to you in real time.  There is no one else with me on the video-conference. I am not recording this conversation and I am asking you not to record it.  This telemedicine visit will be billed to your health insurance or you, if you are self-insured.  You understand you will be responsible for any copayments or coinsurances that apply to your telemedicine visit.  Communication platform used for this encounter:  Bunchball Video Visit (Epic Video Client)       Before starting our telemedicine visit, I am required to get your consent for this virtual check-in or visit by telemedicine. Do you  consent?      Patient Response to Request for Consent:  Yes      URI   This is a new problem. The problem has been gradually worsening. There has been no fever. Associated symptoms include congestion, coughing, rhinorrhea and a sore throat. Pertinent negatives include no abdominal pain, chest pain, diarrhea, ear pain, nausea, rash, sinus pain, sneezing, vomiting or wheezing. Associated symptoms comments: Fatigue, ear pressure, Sinus pressure, has some shortness of breath, no chest pain. He has tried acetaminophen (Mucinex D) for the symptoms. The treatment provided moderate relief.       Tested positive for COVID this am. No sick contacts. Wife is also positive.     Review of Systems   Constitutional: Negative for chills, fatigue and fever.   HENT: Positive for congestion, rhinorrhea and sore throat. Negative for ear pain, sinus pressure, sinus pain and sneezing.    Eyes: Negative for redness.   Respiratory: Positive for cough. Negative for chest tightness, shortness of breath and wheezing.    Cardiovascular: Negative for chest pain.   Gastrointestinal: Negative for abdominal pain, diarrhea, nausea and vomiting.   Musculoskeletal: Negative for arthralgias, myalgias and neck stiffness.   Skin: Negative for rash.   Psychiatric/Behavioral: Negative for confusion.          Past Medical/Surgical/Family/Social History       The following have been reviewed and updated as appropriate in this visit:   Allergies  Meds  Problems         Past Medical History:   Diagnosis Date    Allergic rhinitis     Controlled with zyrtec and Fluticasone nasal spray as needed. Worse in spring and fall.    Attention deficit disorder (ADD) in adult     Managed on Straterra in the past but not effective.    Carpal tunnel syndrome 05/30/2018    Neurologist: Dr. Truong. EMG in 05/2018 with bilateral median neuropathy with right severe and left mild to moderate. Being treated with wrist splints.    Complaint of nasal congestion     Dr Douglas  prescribed Augmentin last day 1/16/20    COVID-19 11/2022    Depression 03/07/2018    Controlled with Escitalopram.     Diverticulosis 09/17/2018    Seen in the sigmoid colon during colonoscopy in 09/2018 and no history of diverticuliitis.     GERD (gastroesophageal reflux disease)     Managed on Omeprazole. Should take medication about 30 minutes prior to meal.  The patient should avoid caffeinated products. The patient should avoid carbonated beverages. The patient was instructed not to eat within 3 hours of bedtime. Pt instructed to avoid fatty meals. Suggested weight reduction.    IFG (impaired fasting glucose) 10/21/2020    Advised to follow a low carbohydrate diet and to reduce BMI < 25.  Advised to exercise for at least 2.5 hours per week.      COURT on CPAP     Pulmonologist: Dr. Pulido. Managed with CPAP at 10 CM H2O    Osteoarthritis of both knees     Orthopedic surgeon: Dr. Cheung. S/P left knee hemiarthroplasty in 2007. X-rays in 12/2014 reveal bilateral small effusions, advanced DJD left knee and mild right knee DJD. MRI of right knee (3/2015) with severe medial compartment osteoarthrosis with subchondral fracture of medial femoral condyle with associated osteochondral fracture posteriorly. Has medial femoral condyle avascular necrosis.    Other hyperlipidemia 10/21/2020    Managed with diet. Requires periodic monitoring of lipids and liver function tests.  Advised to follow low fat diet and to keep BMI < 25.  Advised to exercise for 2.5 hours per week.      Polyp of colon 03/07/2018    Gastroenterologist: Dr. Dunaway. S/P colonoscopy with polypectomy in 2004. Colonoscopy with polypectomy x 7 (5 tubular adenomas and 2 hyperplastic polyps) on 11/5/2014. Colonoscopy in 09/2018 with polypectomy x 5 (Tubular adenoma, sessile serrated adenoma and hyperplastic polyp). Next due in 09/2023.        Past Surgical History:   Procedure Laterality Date    BONE CYST EXCISION Left     Left humeral bone  cyst excision    COLONOSCOPY W/ POLYPECTOMY      JOINT REPLACEMENT      RTKR 2014, partial left knee 2008    REVISION TOTAL KNEE ARTHROPLASTY Right 01/2020    Dr. Harman    VARICOCELECTOMY  1985    VASECTOMY         Family History   Problem Relation Age of Onset    Arthritis Biological Mother     Valvular heart disease Biological Mother     COPD Biological Father     Arthritis Biological Sister     Arthritis Biological Brother     Schizophrenia Biological Brother     No Known Problems Biological Daughter     No Known Problems Biological Daughter     No Known Problems Biological Sister     No Known Problems Biological Son        Social History     Tobacco Use    Smoking status: Never    Smokeless tobacco: Never   Vaping Use    Vaping Use: Never used   Substance Use Topics    Alcohol use: Yes     Alcohol/week: 6.0 - 9.0 standard drinks of alcohol     Types: 6 - 9 Shots of liquor per week    Drug use: No       Patient Care Team:  Tom Douglas MD as PCP - General       Allergies and Medications       Allergies   Allergen Reactions    Cashew Nut     Hydromorphone      Other reaction(s): Urticaria    [address] Flavor      Other reaction(s):  shock, vasovagal syncope  mangos     Pistachio Nut     Sunflower Seed Angioedema         Current Outpatient Medications   Medication Sig Dispense Refill    amoxicillin (AMOXIL) 500 mg tablet Take 4 pills one hour prior to procedure 4 tablet 1    aspirin 81 mg chewable tablet Take 1 tablet (81 mg total) by mouth daily. For 4 weeks to prevent blood clots. 60 tablet 0    azelastine (ASTELIN) 137 mcg (0.1 %) nasal spray Administer 2 sprays into each nostril 2 (two) times a day. Use in each nostril as directed. 30 mL 6    B-complex with vitamin C tablet Take 1 tablet by mouth daily.       cetirizine 10 mg tablet Take 10 mg by mouth daily.      EPINEPHrine (EPIPEN) 0.3 mg/0.3 mL injection syringe Inject 0.3 mL (0.3 mg total) into the thigh as needed for  "anaphylaxis. 2 each 1    escitalopram (LEXAPRO) 10 mg tablet Take 1 tablet (10 mg total) by mouth daily. 90 tablet 3    escitalopram (LEXAPRO) 20 mg tablet Take 1 tablet (20 mg total) by mouth daily. 90 tablet 3    omeprazole (PriLOSEC) 20 mg capsule Take 20 mg by mouth every other day. Take 30 minutes prior to a meal.        No current facility-administered medications for this visit.                Physical Examination       Objective     There were no vitals filed for this visit.    Pulse Readings from Last 5 Encounters:   10/23/23 92   07/11/23 88   01/19/23 72   01/17/23 80   10/19/22 81       Wt Readings from Last 5 Encounters:   10/23/23 134 kg (295 lb 9.6 oz)   07/11/23 127 kg (281 lb)   03/06/23 129 kg (285 lb)   01/19/23 127 kg (281 lb)   01/17/23 131 kg (288 lb 3.2 oz)       Ht Readings from Last 5 Encounters:   10/23/23 1.803 m (5' 11\")   07/11/23 1.778 m (5' 10\")   03/06/23 1.778 m (5' 10\")   01/19/23 1.778 m (5' 10\")   01/17/23 1.791 m (5' 10.5\")       BP Readings from Last 5 Encounters:   10/23/23 122/88   07/11/23 124/80   01/19/23 120/78   01/17/23 118/78   10/19/22 120/80       BMI Readings from Last 4 Encounters:   10/23/23 41.23 kg/m²   07/11/23 40.32 kg/m²   03/06/23 40.89 kg/m²   01/19/23 40.32 kg/m²          Laboratory Results     Lab Results   Component Value Date    WBC 6.0 09/29/2023    WBC 5.27 10/25/2022    WBC 4.94 11/01/2021    HGB 14.5 09/29/2023    HGB 14.4 10/25/2022    HGB 14.1 11/01/2021    HCT 43.5 09/29/2023    HCT 44.0 10/25/2022    HCT 44.2 11/01/2021    MCV 86 09/29/2023    MCV 89.2 10/25/2022    MCV 90.6 11/01/2021     09/29/2023     10/25/2022     11/01/2021        Lab Results   Component Value Date    GLUCOSE 97 09/29/2023    GLUCOSE 97 10/25/2022    GLUCOSE 93 11/01/2021    CALCIUM 9.1 10/25/2022    CALCIUM 9.0 11/01/2021    CALCIUM 9.4 02/07/2020     09/29/2023     10/25/2022     11/01/2021    K 4.8 09/29/2023    K 4.5 10/25/2022 " "   K 4.8 11/01/2021    CO2 24 09/29/2023    CO2 24 10/25/2022    CO2 25 11/01/2021     09/29/2023     10/25/2022     11/01/2021    BUN 19 09/29/2023    BUN 19 10/25/2022    BUN 14 11/01/2021    CREATININE 0.87 09/29/2023    CREATININE 1.0 10/25/2022    CREATININE 1.0 11/01/2021    ALKPHOS 87 09/29/2023    ALKPHOS 58 10/25/2022    ALKPHOS 57 11/01/2021    AST 15 09/29/2023    AST 23 10/25/2022    AST 18 11/01/2021    ALT 18 09/29/2023    ALT 20 10/25/2022    ALT 18 11/01/2021    BILITOT 0.3 09/29/2023    BILITOT 0.6 10/25/2022    BILITOT 0.8 11/01/2021    ALBUMIN 4.0 09/29/2023    ALBUMIN 3.8 10/25/2022    ALBUMIN 3.5 11/01/2021       Lab Results   Component Value Date    CHOL 181 09/29/2023    CHOL 191 10/25/2022    CHOL 183 11/01/2021     Lab Results   Component Value Date    HDL 65 09/29/2023    HDL 64 10/25/2022    HDL 62 11/01/2021     Lab Results   Component Value Date    LDLCALC 96 09/29/2023    LDLCALC 115 (H) 10/25/2022    LDLCALC 111 (H) 11/01/2021     Lab Results   Component Value Date    TRIG 115 09/29/2023    TRIG 58 10/25/2022    TRIG 49 11/01/2021       The 10-year ASCVD risk score (Millie DK, et al., 2019) is: 14.2%    Values used to calculate the score:      Age: 68 years      Sex: Male      Is Non- : No      Diabetic: No      Tobacco smoker: No      Systolic Blood Pressure: 122 mmHg      Is BP treated: No      HDL Cholesterol: 65 mg/dL      Total Cholesterol: 181 mg/dL    Lab Results   Component Value Date    TSH 1.560 09/29/2023    TSH 1.31 10/25/2022    TSH 0.55 11/01/2021     No results found for: \"FREET4\"    Lab Results   Component Value Date    HGBA1C 5.7 (H) 09/29/2023    HGBA1C 5.4 01/05/2016       No results found for: \"CREATUR\"  No results found for: \"MICROALBUR\"  No results found for: \"ALBCRET\"    Lab Results   Component Value Date    PSA 0.7 09/29/2023    PSA 0.47 10/25/2022    PSA 0.59 11/01/2021       Lab Results   Component Value Date    HEPCAB " "<0.1 03/06/2018       No results found for: \"MG\"    No results found for: \"MICROALBCREA\"    Immunization History   Administered Date(s) Administered    INFLUENZA VACCINE QUAD ADJUVANTED 65 and OLDER 10/09/2020, 10/13/2021, 10/23/2023    Influenza Split Preservative Free ID 12/19/2012    Influenza TIV (IM) 11/03/2010    Influenza Vaccine 65 And Older Preservative Free 10/16/2019    Influenza Vaccine High Dose 65 And Older 09/21/2022    Influenza Vaccine Quadrivalent 3 Yr And Older 11/11/2015, 09/25/2018    Influenza Vaccine Quadrivalent Preservative Free 6-35 Months 09/27/2013, 11/21/2014, 10/10/2016    Influenza, Unspecified 11/21/2014, 10/10/2016, 11/06/2017    Pneumococcal Conjugate 13-Valent 11/30/2018    Pneumococcal Polysaccharide 10/09/2020    SARS-COV-2 (COVID-19) VACCINE PFIZER BIVALENT 12 years and older ([place]) 09/21/2022    SARS-COV-2 (COVID-19) VACCINE, MODERNA MONOVALENT 02/25/2021, 03/25/2021, 11/10/2021    SARS-COV-2 (COVID19) VACCINE, PFIZER MONOVALENT 09/21/2022    Tdap 03/03/2011, 08/06/2021    Zoster 11/21/2014    Zoster Vaccine Recombinant Adjuvanted (Shingrix) 12/11/2020, 03/11/2021         Health Maintenance Topics with due status: Overdue       Topic Date Due    COVID-19 Vaccine 09/01/2023    Colorectal Cancer Screening 09/14/2023     Health Maintenance Topics with due status: Not Due       Topic Last Completion Date    DTaP, Tdap, and Td Vaccines 08/06/2021    Medicare Annual Wellness Visit 10/23/2023    Depression Screening 10/23/2023    Falls Risk Screening 10/23/2023     Health Maintenance Topics with due status: Completed       Topic Last Completion Date    Hepatitis C Screening 03/06/2018    Pneumococcal (65 years and older) 10/09/2020    Zoster Vaccine 03/11/2021    Influenza Vaccine 10/23/2023     Health Maintenance Topics with due status: Aged Out       Topic Date Due    Meningococcal ACWY Aged Out    HIB Vaccines Aged Out    Hepatitis B Vaccines Aged Out    IPV " Vaccines Aged Out    HPV Vaccines Aged Out        Assessment and Plan       Assessment/Plan     Problem List Items Addressed This Visit     COVID-19 - Primary    Current Assessment & Plan     Patient tested positive for COVID. Has Paxlovid at home. Will take 3 tablets 2 times per day for 5 days. Quarantine for 5 days. Advised to call if not resolving or if worsening. Can use over-the-counter Mucinex-D and Robitussin            No orders of the defined types were placed in this encounter.             Tom Douglas MD    Return if symptoms worsen or fail to improve.    11/13/2023      Time Spent in Medical Discussion During This Encounter:  15 minutes

## 2023-11-13 NOTE — ASSESSMENT & PLAN NOTE
Patient tested positive for COVID. Has Paxlovid at home. Will take 3 tablets 2 times per day for 5 days. Quarantine for 5 days. Advised to call if not resolving or if worsening. Can use over-the-counter Mucinex-D and Robitussin

## 2023-11-13 NOTE — PATIENT INSTRUCTIONS
Problem List Items Addressed This Visit       COVID-19 - Primary     Patient tested positive for COVID. Has Paxlovid at home. Will take 3 tablets 2 times per day for 5 days. Quarantine for 5 days. Advised to call if not resolving or if worsening. Can use over-the-counter Mucinex-D and Robitussin

## 2024-01-08 RX ORDER — CIPROFLOXACIN HYDROCHLORIDE 3 MG/ML
2 SOLUTION/ DROPS OPHTHALMIC 4 TIMES DAILY
Qty: 5 ML | Refills: 0 | Status: SHIPPED | OUTPATIENT
Start: 2024-01-08 | End: 2024-01-15

## 2024-01-30 ENCOUNTER — CLINICAL SUPPORT (OUTPATIENT)
Dept: BARIATRICS | Facility: CLINIC | Age: 69
End: 2024-01-30

## 2024-01-30 VITALS — WEIGHT: 297.5 LBS | BODY MASS INDEX: 42.59 KG/M2 | HEIGHT: 70 IN

## 2024-01-30 DIAGNOSIS — R63.5 ABNORMAL WEIGHT GAIN: Primary | ICD-10-CM

## 2024-01-30 PROCEDURE — WMDI30

## 2024-01-30 PROCEDURE — RECHECK

## 2024-01-30 NOTE — PROGRESS NOTES
Weight Management Medical Nutrition Assessment  Pt current weight 297.5# at arrival. Lost some weight on Nutrisystem but was not sustainable. Moved recently to the area and has been stressful. Knee replacement Jan 2020/2008. Is ready to begin implementing dietary changes. Biggest struggle is nighttime snacking without honoring hunger and fullness cues. Pt received menu planning information to have 3 balanced meals per day with appropriate amounts of protein and adding fruits/vegetables to support volume. Pt will try to be more cognizant of night time snacking habits and aims to do it less as he puts forth more nutrient dense foods earlier in the day. Will f/u in one month.       Patient seen by Medical Provider in past 6 months:  no  Requested to schedule appointment with Medical Provider: No      Anthropometric Measurements  Start Weight (#): 297.5  Current Weight (#): 297.5  TBW % Change from start weight: n/a  Ideal Body Weight (#):166  Goal Weight (#):Just trend in the direction of weight loss   Highest: 299# a few weeks ago   Lowest: 253# with Nutri-system     Weight Loss History  Previous weight loss attempts: Commercial Programs (Weight Watchers, [employer], etc.)  Meal Replacements (Medifast, Slim Fast, etc.)  Self Created Diets (Portion Control, Healthy Food Choices, etc.)    Food and Nutrition Related History  Wake up: 7AM   Bed Time:10PM     Food Recall  Breakfast:Latte with skim milk with tsp of sugar     Snack:   Lunch:Bowl of cheerios with Milk (2-3 cups); 1-2% milk   Snack:  Dinner:Pasta/meat sauce (1-2 cups); tuna casserole  Snack:    Beverages: water, 1% milk, 2% milk, coffee/tea, and alcohol (average 1 days per week)   Volume of beverage intake: varies - reactionary (20oz)     Weekends: Same  Cravings: salty/crunchy   Trouble area of day:Eating in front of the TV - triscuits/crackers (mindful eating)     Frequency of Eating out: every 3 days; pub food (hamburger and fries); sit down dinner;   Food  restrictions: pistachio, cashew, gina, sunflower seeds   Cooking: wife    Food Shopping: wife     Physical Activity Intake  Activity:Walking, Strength Training, and Stretching  Frequency:frequently  Physical limitations/barriers to exercise: Covid fatigue prior and watching a toddler     Estimated Needs  Energy  Kenia Manley Energy Needs: BMR : 2116   1-2# loss weekly sedentary:   1,539-2,039   1-2# loss weekly lightly active:1,909-2,409  Maintenance calories for sedentary activity level: 2,539  Protein: 90-113g      (1.2-1.5g/kg IBW)  Fluid: 88     (35mL/kg IBW)    Nutrition Diagnosis  Yes;    Overweight/obesity  related to Excess energy intake as evidenced by  BMI more than normative standard for age and sex (obesity-grade III 40+)       Nutrition Intervention    Nutrition Prescription  Calories: 1,600-1,800  Protein: 105-113g  Fluid: 88    Meal Plan (Alcides/Pro/Carb)  Breakfast: 500; 30g PRO  Snack: 100; 5-10g PRO  Lunch: 500; 30g PRO  Snack:  Dinner: 500; 40g PRO  Snack: 200; 5-10g PRO    Nutrition Education:    Calorie controlled menu  Lean protein food choices  Healthy snack options  Food journaling tips      Nutrition Counseling:  Strategies: meal planning, portion sizes, healthy snack choices, hydration, fiber intake, protein intake, exercise, food journal      Monitoring and Evaluation:  Evaluation criteria:  Energy Intake  Meet protein needs  Maintain adequate hydration  Monitor weekly weight  Meal planning/preparation  Food journal   Decreased portions at mealtimes and snacks  Physical activity     Barriers to learning:none  Readiness to change: Action:  (Changing behavior)  Comprehension: excellent  Expected Compliance: excellent

## 2024-02-12 PROBLEM — R73.01 IFG (IMPAIRED FASTING GLUCOSE): Chronic | Status: ACTIVE | Noted: 2020-10-21

## 2024-02-12 PROBLEM — J30.9 ALLERGIC RHINITIS: Chronic | Status: ACTIVE | Noted: 2018-03-07

## 2024-02-12 PROBLEM — K57.90 DIVERTICULOSIS: Chronic | Status: ACTIVE | Noted: 2018-09-17

## 2024-02-12 PROBLEM — K21.9 GASTRO-ESOPHAGEAL REFLUX DISEASE WITHOUT ESOPHAGITIS: Chronic | Status: ACTIVE | Noted: 2018-03-07

## 2024-02-12 PROBLEM — F32.A DEPRESSION: Chronic | Status: ACTIVE | Noted: 2018-03-07

## 2024-02-12 PROBLEM — G56.00 CARPAL TUNNEL SYNDROME: Chronic | Status: ACTIVE | Noted: 2018-05-30

## 2024-02-12 PROBLEM — E78.49 OTHER HYPERLIPIDEMIA: Chronic | Status: ACTIVE | Noted: 2020-10-21

## 2024-02-12 PROBLEM — U07.1 COVID-19: Status: ACTIVE | Noted: 2023-11-13

## 2024-02-12 PROBLEM — F98.8 ATTENTION DEFICIT DISORDER (ADD) WITHOUT HYPERACTIVITY: Chronic | Status: ACTIVE | Noted: 2018-03-07

## 2024-02-12 PROBLEM — K63.5 POLYP OF COLON: Chronic | Status: ACTIVE | Noted: 2018-03-07

## 2024-02-12 PROBLEM — E66.01 OBESITY, MORBID (HCC): Status: ACTIVE | Noted: 2023-01-17

## 2024-02-12 PROBLEM — M17.9 OSTEOARTHRITIS OF KNEE: Chronic | Status: ACTIVE | Noted: 2018-03-07

## 2024-02-12 PROBLEM — J32.9 FREQUENT SINUS INFECTIONS: Status: ACTIVE | Noted: 2024-02-12

## 2024-02-16 ENCOUNTER — OFFICE VISIT (OUTPATIENT)
Dept: FAMILY MEDICINE CLINIC | Facility: CLINIC | Age: 69
End: 2024-02-16
Payer: MEDICARE

## 2024-02-16 VITALS
HEART RATE: 80 BPM | SYSTOLIC BLOOD PRESSURE: 122 MMHG | HEIGHT: 70 IN | TEMPERATURE: 97.8 F | OXYGEN SATURATION: 98 % | DIASTOLIC BLOOD PRESSURE: 78 MMHG | WEIGHT: 294 LBS | RESPIRATION RATE: 18 BRPM | BODY MASS INDEX: 42.09 KG/M2

## 2024-02-16 DIAGNOSIS — Z12.5 SCREENING PSA (PROSTATE SPECIFIC ANTIGEN): ICD-10-CM

## 2024-02-16 DIAGNOSIS — F33.1 MDD (MAJOR DEPRESSIVE DISORDER), RECURRENT EPISODE, MODERATE (HCC): ICD-10-CM

## 2024-02-16 DIAGNOSIS — E55.9 VITAMIN D DEFICIENCY: ICD-10-CM

## 2024-02-16 DIAGNOSIS — E78.00 PURE HYPERCHOLESTEROLEMIA: ICD-10-CM

## 2024-02-16 DIAGNOSIS — R73.01 IFG (IMPAIRED FASTING GLUCOSE): Primary | Chronic | ICD-10-CM

## 2024-02-16 DIAGNOSIS — E66.01 CLASS 3 SEVERE OBESITY DUE TO EXCESS CALORIES WITH SERIOUS COMORBIDITY AND BODY MASS INDEX (BMI) OF 40.0 TO 44.9 IN ADULT (HCC): ICD-10-CM

## 2024-02-16 DIAGNOSIS — K21.9 GASTRO-ESOPHAGEAL REFLUX DISEASE WITHOUT ESOPHAGITIS: Chronic | ICD-10-CM

## 2024-02-16 PROBLEM — U07.1 COVID-19: Status: RESOLVED | Noted: 2023-11-13 | Resolved: 2024-02-16

## 2024-02-16 PROCEDURE — 99204 OFFICE O/P NEW MOD 45 MIN: CPT | Performed by: FAMILY MEDICINE

## 2024-02-16 RX ORDER — OMEPRAZOLE 20 MG/1
20 CAPSULE, DELAYED RELEASE ORAL DAILY
COMMUNITY

## 2024-02-16 RX ORDER — ESCITALOPRAM OXALATE 10 MG/1
10 TABLET ORAL DAILY
COMMUNITY

## 2024-02-16 RX ORDER — ASPIRIN 81 MG/1
81 TABLET ORAL DAILY
COMMUNITY
Start: 2020-01-01

## 2024-02-16 RX ORDER — CETIRIZINE HYDROCHLORIDE 10 MG/1
10 TABLET ORAL DAILY
COMMUNITY
Start: 1992-01-01

## 2024-02-16 RX ORDER — ESCITALOPRAM OXALATE 20 MG/1
20 TABLET ORAL DAILY
COMMUNITY

## 2024-02-16 RX ORDER — VITAMIN B COMPLEX
CAPSULE ORAL
COMMUNITY

## 2024-02-16 RX ORDER — MULTIVIT-MIN/IRON/FOLIC ACID/K 18-600-40
2000 CAPSULE ORAL DAILY
COMMUNITY
Start: 2015-01-01

## 2024-02-16 NOTE — PATIENT INSTRUCTIONS
Tumeric supplement   Try glucosamine with chondroitin to help with your joint pains. Follow the directions on the bottle as to how to take this. Some formulas are once a day. Try this for at least a couple of months before you decide if this is working.   Tylenol up to 4 grams a day.

## 2024-02-16 NOTE — PROGRESS NOTES
1. IFG (impaired fasting glucose)  Assessment & Plan:  Mild on last blood work. He is actively working on changing diet for weight loss. Seeing a dietician.     Orders:  -     Hemoglobin A1C; Future; Expected date: 10/01/2024    2. Pure hypercholesterolemia  Assessment & Plan:  Update fasting blood work prior to follow up     Orders:  -     Comprehensive metabolic panel; Future; Expected date: 10/01/2024  -     Lipid panel; Future; Expected date: 10/01/2024    3. Class 3 severe obesity due to excess calories with serious comorbidity and body mass index (BMI) of 40.0 to 44.9 in adult (HCC)  Assessment & Plan:  Encouraged diet and exercise to help avoid weight related complications. Can consider GLP in the future if he needs more help.       4. Gastro-esophageal reflux disease without esophagitis  Assessment & Plan:  Doing well on long term omeprazole.       5. Screening PSA (prostate specific antigen)  -     PSA, Total Screen; Future; Expected date: 10/01/2024    6. MDD (major depressive disorder), recurrent episode, moderate ([facility])  Assessment & Plan:  Failed wellbutrin and Celexa in the past. Has been on Lexapro for years. Was increased from 20 to 30 mg daily about a year ago. He feels he has been doing well. Seeing therapist. Today we reviewed this is above recommended dose, and may be causing some fatigue. He will try taking the extra 10 mg every other day and see if he can wean down. Can consider switching medication to helps with fatigue and mood.       7. Vitamin D deficiency  Assessment & Plan:  Update with next labs     Orders:  -     Vitamin D 25 hydroxy; Future; Expected date: 10/01/2024       Pt up to date on . Will request for chart.   Colonoscopy 12/20/23 tubular adenoma Armando f/u 3 years   Hep C 3/6/18  MWV due 10/19/2023     Return in about 8 months (around 10/15/2024) for Medicare Wellness Visit.    Subjective:   Gavino is a 68 y.o. male here today for a follow-up on his current medical  conditions:    Patient Active Problem List   Diagnosis    Allergic rhinitis    Attention deficit disorder (ADD) without hyperactivity    Carpal tunnel syndrome    MDD (major depressive disorder), recurrent episode, moderate (HCC)    Diverticulosis    Frequent sinus infections    Gastro-esophageal reflux disease without esophagitis    History of knee replacement    IFG (impaired fasting glucose)    Class 3 severe obesity due to excess calories with serious comorbidity and body mass index (BMI) of 40.0 to 44.9 in adult (HCC)    Osteoarthritis of knee    Pure hypercholesterolemia    Polyp of colon    GILLIAN on CPAP    Vitamin D deficiency       No care team member to display    Current Medications:  Current Outpatient Medications   Medication Sig Dispense Refill    aspirin (Aspirin 81) 81 mg EC tablet Take 81 mg by mouth daily      B Complex CAPS Take by mouth      cetirizine (ZyrTEC Allergy) 10 mg tablet Take 10 mg by mouth daily      Cholecalciferol (Vitamin D) 50 MCG ( UT) CAPS Take 2,000 Units by mouth daily      escitalopram (LEXAPRO) 10 mg tablet Take 10 mg by mouth daily      escitalopram (LEXAPRO) 20 mg tablet Take 20 mg by mouth daily      omeprazole (PriLOSEC) 20 mg delayed release capsule Take 20 mg by mouth daily       No current facility-administered medications for this visit.       HPI:  Chief Complaint   Patient presents with    New Patient Visit    Establish Care    Ankle Pain     -- Above per clinical staff and reviewed. --    PHQ-2/9 Depression Screening    Little interest or pleasure in doing things: 1 - several days  Feeling down, depressed, or hopeless: 1 - several days  Trouble falling or staying asleep, or sleeping too much: 3 - nearly every day  Feeling tired or having little energy: 3 - nearly every day  Poor appetite or overeatin - more than half the days  Feeling bad about yourself - or that you are a failure or have let yourself or your family down: 0 - not at all  Trouble  "concentrating on things, such as reading the newspaper or watching television: 0 - not at all  Moving or speaking so slowly that other people could have noticed. Or the opposite - being so fidgety or restless that you have been moving around a lot more than usual: 0 - not at all  Thoughts that you would be better off dead, or of hurting yourself in some way: 0 - not at all  PHQ-9 Score: 10  PHQ-9 Interpretation: Moderate depression            New pt [facility]  Old records reviewed   Recurrent sinusisits   GILLIAN sleep medicine [facility]   Labs 9/29/23 noraml other than A1C 5.7   9/29/24 PSA cmp hba1c lipids   Today:  Due for COVID vaccine - first time was November.   Had gained weight with moving   Met with dietician 2 weeks ago   Has always been higher than the target, this is the highest he has been. Usually 265.    Works with  twice a week via video   Ankles are sore - if he flexes feels pain   In foot and ankle   Does not stop him most days   With exercising painful, 5 -6 /10     Depression diagnosed in 1990s   He has never been hospitalized   Started on wellbutrin for few years - caused excessive sweating   Does well on lexapro 20 mg   Doctor added 10 mg daily   Has been on 30 mg daily   Does therapy for himself and couples therapy   Feels tired a lot. Sleeps 8 - 9 hours a night. Has a nap in afternoon for an hour.   CPAP for mild GILLIAN.     The following portions of the patient's history were reviewed and updated as appropriate: allergies, current medications, past family history, past medical history, past social history, past surgical history and problem list.    Objective:  Vitals:  /78 (BP Location: Left arm, Patient Position: Sitting, Cuff Size: Large)   Pulse 80   Temp 97.8 °F (36.6 °C) (Temporal)   Resp 18   Ht 5' 9.5\" (1.765 m)   Wt 133 kg (294 lb)   SpO2 98%   BMI 42.79 kg/m²    Wt Readings from Last 3 Encounters:   02/16/24 133 kg (294 " lb)   01/30/24 135 kg (297 lb 8 oz)      BP Readings from Last 3 Encounters:   02/16/24 122/78        Review of Systems   He has no other concerns. No unexpected weight changes. No chest pain, SOB, or palpitations. No GERD with meds. No changes in bowels or bladder. Sleeping well. No mood changes. + foot/ankle pain.     Physical Exam   Constitutional:  he appears well-developed and well-nourished.  HENT: Head: Normocephalic.   Neck: Neck supple.   Cardiovascular: Normal rate, regular rhythm and normal heart sounds.   Pulmonary/Chest: Effort normal and breath sounds normal. No wheezes, rales, or rhonchi.   Abdominal: Soft. Bowel sounds are normal. There is no tenderness. No hepatosplenomegaly.   Musculoskeletal: he exhibits no edema.   Lymphadenopathy: he has no cervical adenopathy.   Neurological: he is alert and oriented to person, place, and time.   Skin: Skin is warm and dry.   Psychiatric: he has a normal mood and affect. his behavior is normal. Thought content normal.       Depression Screening and Follow-up Plan: Patient's depression screening was positive with a PHQ-9 score of 10. Patient assessed for underlying major depression. Brief counseling provided and recommend additional follow-up/re-evaluation next office visit.

## 2024-02-17 PROBLEM — F33.1 MDD (MAJOR DEPRESSIVE DISORDER), RECURRENT EPISODE, MODERATE (HCC): Status: ACTIVE | Noted: 2018-03-07

## 2024-02-17 PROBLEM — E78.00 PURE HYPERCHOLESTEROLEMIA: Status: ACTIVE | Noted: 2020-10-21

## 2024-02-17 PROBLEM — E55.9 VITAMIN D DEFICIENCY: Status: ACTIVE | Noted: 2024-02-17

## 2024-02-17 PROBLEM — E66.813 CLASS 3 SEVERE OBESITY DUE TO EXCESS CALORIES WITH SERIOUS COMORBIDITY AND BODY MASS INDEX (BMI) OF 40.0 TO 44.9 IN ADULT (HCC): Status: ACTIVE | Noted: 2023-01-17

## 2024-02-17 NOTE — ASSESSMENT & PLAN NOTE
Failed wellbutrin and Celexa in the past. Has been on Lexapro for years. Was increased from 20 to 30 mg daily about a year ago. He feels he has been doing well. Seeing therapist. Today we reviewed this is above recommended dose, and may be causing some fatigue. He will try taking the extra 10 mg every other day and see if he can wean down. Can consider switching medication to helps with fatigue and mood.

## 2024-02-18 NOTE — ASSESSMENT & PLAN NOTE
Encouraged diet and exercise to help avoid weight related complications. Can consider GLP in the future if he needs more help.

## 2024-02-18 NOTE — ASSESSMENT & PLAN NOTE
Mild on last blood work. He is actively working on changing diet for weight loss. Seeing a dietician.

## 2024-02-20 ENCOUNTER — TELEPHONE (OUTPATIENT)
Dept: ADMINISTRATIVE | Facility: OTHER | Age: 69
End: 2024-02-20

## 2024-02-20 NOTE — TELEPHONE ENCOUNTER
----- Message from Sera Richter DO sent at 2/17/2024  7:04 PM EST -----  Regarding: Care Gap from Care Everywhere   I have found documentation regarding Gavino Hall within Care Everywhere (CE) Activity. Please link and update the Patient's chart. Thank You.    In Care Everywhere:    Colonoscopy 12/20/23 tubular adenoma Armando f/u 3 years   Hep C 3/6/18    Thank you!

## 2024-02-20 NOTE — TELEPHONE ENCOUNTER
Upon review of the In Basket request and the patient's chart, initial outreach has been made via fax to facility for colonoscopy. Please see Contacts section for details.     Thank you  Shun Argueta

## 2024-02-20 NOTE — LETTER
Procedure Request Form: Colonoscopy      Date Requested: 24  Patient: Gavino Hall  Patient : 1955   Referring Provider: No primary care provider on file.        Date of Procedure _____23_______       The above patient has informed us that they have completed their   most recent Colonoscopy at your facility. Please complete   this form and attach all corresponding procedure reports/results.    Comments _Procedure notes are not in Care  Everywhere_______________________________________________________  ____________________________________________________________________  ____________________________________________________________________  ____________________________________________________________________    Facility Completing Procedure _________________________________________    Form Completed By (print name) _______________________________________      Signature __________________________________________________________      These reports are needed for  compliance.    Please fax this completed form and a copy of the procedure report to our office located at [address] [place] [address] as soon as possible to Fax [phone] michael Hoffmann: Phone [phone]    We thank you for your assistance in treating our mutual patient.

## 2024-02-20 NOTE — TELEPHONE ENCOUNTER
Upon review of the In Basket request we were able to locate, review, and update the patient chart as requested for Hepatitis C .    Any additional questions or concerns should be emailed to the Practice Liaisons via the appropriate education email address, please do not reply via In Basket.    Thank you  Shun Argueta

## 2024-02-21 PROBLEM — J32.9 FREQUENT SINUS INFECTIONS: Status: RESOLVED | Noted: 2024-02-12 | Resolved: 2024-02-21

## 2024-02-28 ENCOUNTER — CLINICAL SUPPORT (OUTPATIENT)
Dept: BARIATRICS | Facility: CLINIC | Age: 69
End: 2024-02-28

## 2024-02-28 VITALS — WEIGHT: 297.5 LBS | BODY MASS INDEX: 44.06 KG/M2 | HEIGHT: 69 IN

## 2024-02-28 DIAGNOSIS — R63.5 ABNORMAL WEIGHT GAIN: Primary | ICD-10-CM

## 2024-02-28 PROCEDURE — RECHECK

## 2024-02-28 PROCEDURE — WMDI30

## 2024-02-28 NOTE — PROGRESS NOTES
Weight Management Medical Nutrition Assessment  Pt current weight 297.5# at follow up - questioning if shoes were on last time. Menu Plan with diet for the week will be helpful with weight loss goals. Consider car snack -- almonds, fruit, protein shakes, string cheese, etc. Along with easy to make dinner ideas (frozen meatballs, tuna packets, microwave rice, etc.). Will have just one drink per week when out with his friends. Will f/u in one month.         Patient seen by Medical Provider in past 6 months:  no  Requested to schedule appointment with Medical Provider: No        Anthropometric Measurements  Start Weight (#): 297.5  Current Weight (#): 297.5  TBW % Change from start weight: n/a  Ideal Body Weight (#):166  Goal Weight (#):Just trend in the direction of weight loss   Highest: 299# a few weeks ago   Lowest: 253# with Nutri-system      Weight Loss History  Previous weight loss attempts: Commercial Programs (Weight Watchers, Continuing Education Records & Resources, etc.)  Meal Replacements (Medifast, Slim Fast, etc.)  Self Created Diets (Portion Control, Healthy Food Choices, etc.)     Food and Nutrition Related History  Wake up: 7AM              Bed Time:10PM      Food Recall  Breakfast:Latte with skim milk with tsp of sugar                      Snack: Yogurt with 1/4 cup granola and less than 1/4 cup raisins   Lunch:Could be the yogurt combination    Snack: none  Dinner: Chicken thighs - sheet pan recipes (potatoes, veggies)   Snack:     Beverages: water, 1% milk, 2% milk, coffee/tea, and alcohol (average 1 days per week)   Volume of beverage intake: varies - reactionary (20oz)      Weekends: Same  Cravings: salty/crunchy   Trouble area of day:Eating in front of the TV - triscuits/crackers (mindful eating)      Frequency of Eating out: every 3 days; pub food (hamburger and fries); sit down dinner;   Food restrictions: pistachio, cashew, gina, sunflower seeds   Cooking: wife    Food Shopping: wife      Physical Activity  Intake  Activity:Walking, Strength Training, and Stretching  Frequency:frequently  Physical limitations/barriers to exercise: Covid fatigue prior and watching a toddler      Estimated Needs  Energy  Kenia Manley Energy Needs: BMR : 2116   1-2# loss weekly sedentary:   1,539-2,039   1-2# loss weekly lightly active:1,909-2,409  Maintenance calories for sedentary activity level: 2,539  Protein: 90-113g      (1.2-1.5g/kg IBW)  Fluid: 88     (35mL/kg IBW)     Nutrition Diagnosis  Yes;    Overweight/obesity  related to Excess energy intake as evidenced by  BMI more than normative standard for age and sex (obesity-grade III 40+)     Nutrition Intervention     Nutrition Prescription  Calories: 1,600-1,800  Protein: 105-113g  Fluid: 88     Meal Plan (Alcides/Pro/Carb)  Breakfast: 500; 30g PRO  Snack: 100; 5-10g PRO  Lunch: 500; 30g PRO  Snack:  Dinner: 500; 40g PRO  Snack: 200; 5-10g PRO     Nutrition Education:    Calorie controlled menu  Lean protein food choices  Healthy snack options  Food journaling tips        Nutrition Counseling:  Strategies: meal planning, portion sizes, healthy snack choices, hydration, fiber intake, protein intake, exercise, food journal        Monitoring and Evaluation:  Evaluation criteria:  Energy Intake  Meet protein needs  Maintain adequate hydration  Monitor weekly weight  Meal planning/preparation  Food journal   Decreased portions at mealtimes and snacks  Physical activity      Barriers to learning:none  Readiness to change: Action:  (Changing behavior)  Comprehension: excellent  Expected Compliance: excellent

## 2024-03-05 NOTE — TELEPHONE ENCOUNTER
As a follow-up, a second attempt has been made for outreach via fax to facility. Please see Contacts section for details.    Thank you  Shun Argueta

## 2024-03-14 NOTE — TELEPHONE ENCOUNTER
As a final attempt, a third outreach has been made via telephone call to facility. Please see Contacts section for details. This encounter will be closed and completed by end of day. Should we receive the requested information because of previous outreach attempts, the requested patient's chart will be updated appropriately.     Thank you  Shun Argueta

## 2024-03-20 NOTE — TELEPHONE ENCOUNTER
Upon review of the In Basket request we were able to locate, review, and update the patient chart as requested for CRC: Colonoscopy.    Any additional questions or concerns should be emailed to the Practice Liaisons via the appropriate education email address, please do not reply via In Basket.    Thank you  Shun Argueta

## 2024-03-22 ENCOUNTER — OFFICE VISIT (OUTPATIENT)
Dept: URGENT CARE | Facility: MEDICAL CENTER | Age: 69
End: 2024-03-22
Payer: MEDICARE

## 2024-03-22 VITALS
TEMPERATURE: 99 F | BODY MASS INDEX: 41.92 KG/M2 | OXYGEN SATURATION: 98 % | RESPIRATION RATE: 18 BRPM | HEIGHT: 70 IN | WEIGHT: 292.8 LBS | SYSTOLIC BLOOD PRESSURE: 139 MMHG | DIASTOLIC BLOOD PRESSURE: 90 MMHG | HEART RATE: 71 BPM

## 2024-03-22 DIAGNOSIS — T16.2XXA FOREIGN BODY OF LEFT EAR, INITIAL ENCOUNTER: ICD-10-CM

## 2024-03-22 DIAGNOSIS — T16.1XXA FOREIGN BODY OF RIGHT EAR, INITIAL ENCOUNTER: Primary | ICD-10-CM

## 2024-03-22 PROCEDURE — 99213 OFFICE O/P EST LOW 20 MIN: CPT

## 2024-03-22 PROCEDURE — 69200 CLEAR OUTER EAR CANAL: CPT

## 2024-03-22 PROCEDURE — G0463 HOSPITAL OUTPT CLINIC VISIT: HCPCS

## 2024-03-22 NOTE — PROGRESS NOTES
"  [facility]        NAME: Gavino Hall is a 68 y.o. male  : 1955    MRN: [id number]  DATE: 2024  TIME: 5:06 PM    Assessment and Plan   Foreign body of right ear, initial encounter [T16.1XXA]  1. Foreign body of right ear, initial encounter  Foreign body removal      2. Foreign body of left ear, initial encounter  Foreign body removal        Patient Instructions     Follow up with PCP in 3-5 days if no improvement. Proceed to ER if symptoms worsen.    Chief Complaint     Chief Complaint   Patient presents with    Earache     Pt states three to four days ago left ear became painful and today the right ear is painful. \"I feel like I am under water, everything is muffled.\"  Pt does wear b/l hearing aids         History of Present Illness     Gavino Hall is a 68 y.o. male presenting to the office today complaining of ear pain.   Symptoms have been present for 4 days, and include b/l ear discomfort and muffled sensation.   He has tried nothing for his symptoms, no relief.  He notes this has happened in the past when he was congested.   Sick contacts include: none    Review of Systems     Review of Systems   Constitutional:  Negative for chills and fever.   HENT:  Positive for ear pain. Negative for congestion, ear discharge, sore throat and trouble swallowing.    Respiratory:  Negative for cough, shortness of breath, wheezing and stridor.    Gastrointestinal: Negative.    Genitourinary: Negative.    Skin:  Negative for rash.   Neurological:  Negative for seizures and syncope.       Current Medications       Current Outpatient Medications:     aspirin (Aspirin 81) 81 mg EC tablet, Take 81 mg by mouth daily, Disp: , Rfl:     B Complex CAPS, Take by mouth, Disp: , Rfl:     cetirizine (ZyrTEC Allergy) 10 mg tablet, Take 10 mg by mouth daily, Disp: , Rfl:     Cholecalciferol (Vitamin D) 50 MCG ( UT) CAPS, Take 2,000 Units by mouth daily, Disp: , Rfl:     escitalopram (LEXAPRO) 20 mg " tablet, Take 20 mg by mouth daily, Disp: , Rfl:     Glucosamine-Chondroitin--200-150 MG TABS, Take 1 tablet by mouth in the morning, Disp: , Rfl:     omeprazole (PriLOSEC) 20 mg delayed release capsule, Take 20 mg by mouth daily, Disp: , Rfl:     Turmeric (QC TUMERIC COMPLEX PO), Take 1 tablet by mouth in the morning, Disp: , Rfl:     escitalopram (LEXAPRO) 10 mg tablet, Take 10 mg by mouth daily (Patient not taking: Reported on 3/22/2024), Disp: , Rfl:     Current Allergies     Allergies as of 03/22/2024 - Reviewed 03/22/2024   Allergen Reaction Noted    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    Nuts - food allergy Anaphylaxis 01/06/2020    Dust mite extract Nasal Congestion 01/01/1958    Grass pollen(k-o-r-t-swt colby) Nasal Congestion 01/01/1958    Hydromorphone Nausea Only 05/03/2014    Other Sneezing 01/22/2013    Pollen extract Sneezing 01/22/2013    Short ragweed pollen ext Sneezing 01/22/2013    Sunflower oil - food allergy Swelling 11/30/2018            The following portions of the patient's history were reviewed and updated as appropriate: allergies, current medications, past family history, past medical history, past social history, past surgical history and problem list.     Past Medical History:   Diagnosis Date    COVID-19     Last Assessment & Plan:    Formatting of this note might be different from the original.   Patient tested positive for COVID. Has Paxlovid at home. Will take 3 tablets 2 times per day for 5 days. Quarantine for 5 days. Advised to call if not resolving or if worsening. Can use over-the-counter Mucinex-D and Robitussin       Past Surgical History:   Procedure Laterality Date    BONE CYST EXCISION  1966    Right humerous    REPLACEMENT TOTAL KNEE BILATERAL  2020    Patient has had 3 replacements total. Last in 2020.    VASECTOMY  1990       Family History   Problem Relation Age of Onset    Heart disease Mother     COPD  "Father     Glaucoma Paternal Grandmother        Medications have been verified.    Objective     /90 (BP Location: Left arm, Patient Position: Sitting)   Pulse 71   Temp 99 °F (37.2 °C)   Resp 18   Ht 5' 10\" (1.778 m)   Wt 133 kg (292 lb 12.8 oz)   SpO2 98%   BMI 42.01 kg/m²   No LMP for male patient.     Physical Exam     Physical Exam  Vitals and nursing note reviewed.   Constitutional:       General: He is not in acute distress.     Appearance: Normal appearance. He is well-developed and normal weight. He is not ill-appearing, toxic-appearing or diaphoretic.   HENT:      Head: Normocephalic and atraumatic.      Right Ear: Tympanic membrane and external ear normal. No drainage, swelling or tenderness. No middle ear effusion. There is no impacted cerumen. A foreign body is present. Tympanic membrane is not erythematous.      Left Ear: Tympanic membrane and external ear normal. No drainage, swelling or tenderness.  No middle ear effusion. There is no impacted cerumen. A foreign body is present. Tympanic membrane is not erythematous.      Ears:      Comments: B/l cotton swabs in ear canal       Nose: No congestion or rhinorrhea.      Mouth/Throat:      Mouth: Mucous membranes are moist. No oral lesions.      Pharynx: Uvula midline. No pharyngeal swelling, oropharyngeal exudate, posterior oropharyngeal erythema or uvula swelling.      Tonsils: No tonsillar exudate or tonsillar abscesses.   Eyes:      General: No scleral icterus.        Right eye: No discharge.         Left eye: No discharge.      Conjunctiva/sclera: Conjunctivae normal.   Neck:      Thyroid: No thyromegaly.   Cardiovascular:      Rate and Rhythm: Normal rate and regular rhythm.      Heart sounds: Normal heart sounds. No murmur heard.     No friction rub. No gallop.   Pulmonary:      Effort: Pulmonary effort is normal. No respiratory distress.      Breath sounds: Normal breath sounds. No stridor. No wheezing, rhonchi or rales.   Chest:     " " Chest wall: No tenderness.   Musculoskeletal:         General: Normal range of motion.      Cervical back: Normal range of motion and neck supple.   Lymphadenopathy:      Cervical: No cervical adenopathy.   Skin:     General: Skin is warm and dry.      Capillary Refill: Capillary refill takes less than 2 seconds.   Neurological:      General: No focal deficit present.      Mental Status: He is alert and oriented to person, place, and time.   Psychiatric:         Mood and Affect: Mood normal.         Behavior: Behavior normal.     Universal Protocol:  Consent: Verbal consent obtained. Written consent obtained.  Risks and benefits: risks, benefits and alternatives were discussed  Consent given by: patient  Time out: Immediately prior to procedure a \"time out\" was called to verify the correct patient, procedure, equipment, support staff and site/side marked as required.  Timeout called at: 3/22/2024 5:04 PM.  Patient understanding: patient states understanding of the procedure being performed  Patient consent: the patient's understanding of the procedure matches consent given  Procedure consent: procedure consent matches procedure scheduled  Relevant documents: relevant documents present and verified  Test results: test results not available  Site marked: the operative site was not marked  Radiology Images displayed and confirmed. If images not available, report reviewed: imaging studies not available  Patient identity confirmed: verbally with patient  Foreign body removal    Date/Time: 3/22/2024 2:00 PM    Performed by: Garima Boyd PA-C  Authorized by: Garima Boyd PA-C  Body area: ear  Location details: right ear    Sedation:  Patient sedated: no  Patient restrained: no  Patient cooperative: yes  Localization method: ENT speculum  Removal mechanism: curette  2 objects recovered.  Post-procedure assessment: foreign body removed  Patient tolerance: patient tolerated the procedure well with no immediate " complications    2 cotton swabs removed - one from right and one from left ear

## 2024-03-27 ENCOUNTER — CLINICAL SUPPORT (OUTPATIENT)
Dept: BARIATRICS | Facility: CLINIC | Age: 69
End: 2024-03-27

## 2024-03-27 VITALS — HEIGHT: 70 IN | WEIGHT: 294.2 LBS | BODY MASS INDEX: 42.12 KG/M2

## 2024-03-27 DIAGNOSIS — R63.5 ABNORMAL WEIGHT GAIN: Primary | ICD-10-CM

## 2024-03-27 PROCEDURE — WMDI30

## 2024-03-27 PROCEDURE — RECHECK

## 2024-03-27 NOTE — PROGRESS NOTES
Weight Management Medical Nutrition Assessment  Pt current weight 294.2# at follow up - with shoes on. Lost 3.3# Menu Plan with diet for the week will be helpful with weight loss goals. Struggling with Meal Planning -- however, most morning is yogurt, granola, and raisins with Coffee. Mid day with have yogurt. Wife has been cooking to help with making whole food choices. Biggest struggle is portion size and night time snacking. Discussed having more protein throughout the day, such as adding a protein shake in late AM. When considering late night snacking, portion out a reasonable amount on a plate or in a bowl, or setting a timer for 10 minutes when snacking to check in when timer goes off (versus mindless eating for 30-40 minutes). Continue to lean into saying yes to the hard things, or ideas that might help with weight loss journey. Will follow up in one month.         Patient seen by Medical Provider in past 6 months:  no  Requested to schedule appointment with Medical Provider: No        Anthropometric Measurements  Start Weight (#): 297.5  Current Weight (#): 294.2  TBW % Change from start weight: n/a  Ideal Body Weight (#):166  Goal Weight (#):Just trend in the direction of weight loss   Highest: 299# a few weeks ago   Lowest: 253# with Nutri-system      Weight Loss History  Previous weight loss attempts: Commercial Programs (Weight Watchers, [facility], etc.)  Meal Replacements (Medifast, Slim Fast, etc.)  Self Created Diets (Portion Control, Healthy Food Choices, etc.)     Food and Nutrition Related History  Wake up: 7AM              Bed Time:10PM      Food Recall  Breakfast:Latte with skim milk with tsp of sugar                      Snack: Yogurt with 1/4 cup granola and less than 1/4 cup raisins   Lunch:Could be the yogurt combination    Snack: none  Dinner: Chicken thighs - sheet pan recipes (potatoes, veggies)   Snack:     Beverages: water, 1% milk, 2% milk, coffee/tea, and alcohol (average 1 days per  week)   Volume of beverage intake: varies - reactionary (20oz)      Weekends: Same  Cravings: salty/crunchy   Trouble area of day:Eating in front of the TV - triscuits/crackers (mindful eating)      Frequency of Eating out: every 3 days; pub food (hamburger and fries); sit down dinner;   Food restrictions: pistachio, cashew, gina, sunflower seeds   Cooking: wife    Food Shopping: wife      Physical Activity Intake  Activity:Walking, Strength Training, and Stretching  Frequency:frequently  Physical limitations/barriers to exercise: Covid fatigue prior and watching a toddler      Estimated Needs  Energy  Kenia Manley Energy Needs: BMR : 2116   1-2# loss weekly sedentary:   1,539-2,039   1-2# loss weekly lightly active:1,909-2,409  Maintenance calories for sedentary activity level: 2,539  Protein: 90-113g      (1.2-1.5g/kg IBW)  Fluid: 88     (35mL/kg IBW)     Nutrition Diagnosis  Yes;    Overweight/obesity  related to Excess energy intake as evidenced by  BMI more than normative standard for age and sex (obesity-grade III 40+)     Nutrition Intervention     Nutrition Prescription  Calories: 1,600-1,800  Protein: 105-113g  Fluid: 88     Meal Plan (Alcidse/Pro/Carb)  Breakfast: 500; 30g PRO  Snack: 100; 5-10g PRO  Lunch: 500; 30g PRO  Snack:  Dinner: 500; 40g PRO  Snack: 200; 5-10g PRO     Nutrition Education:    Calorie controlled menu  Lean protein food choices  Healthy snack options  Food journaling tips        Nutrition Counseling:  Strategies: meal planning, portion sizes, healthy snack choices, hydration, fiber intake, protein intake, exercise, food journal        Monitoring and Evaluation:  Evaluation criteria:  Energy Intake  Meet protein needs  Maintain adequate hydration  Monitor weekly weight  Meal planning/preparation  Food journal   Decreased portions at mealtimes and snacks  Physical activity      Barriers to learning:none  Readiness to change: Action:  (Changing behavior)  Comprehension:  excellent  Expected Compliance: excellent

## 2024-04-24 ENCOUNTER — CLINICAL SUPPORT (OUTPATIENT)
Dept: BARIATRICS | Facility: CLINIC | Age: 69
End: 2024-04-24

## 2024-04-24 VITALS — WEIGHT: 289 LBS | BODY MASS INDEX: 41.37 KG/M2 | HEIGHT: 70 IN

## 2024-04-24 DIAGNOSIS — R63.5 ABNORMAL WEIGHT GAIN: Primary | ICD-10-CM

## 2024-04-24 PROCEDURE — WMDI30

## 2024-04-24 PROCEDURE — RECHECK

## 2024-04-24 NOTE — PROGRESS NOTES
Weight Management Medical Nutrition Assessment  Yasmeen current weight 289# at follow up - with shoes on. Lost 8#. Worked with his son in law on grocery shopping list and planning. Putting protein powder in coffee in the AM with a yogurt. Casey is not waiting til he is hungry. Consuming 2 slices of turkey breast and a slice of cheese and Mariusz Killer Light bread. Night time snacking is going pretty well having some meat sticks and a cheese (cabot). Mindless snacking has been decreasing. Doing pilates with a  and has some soreness with ankles. Is hoping to get additional physical activity as this progresses. Will bring his protein drink with a fruit helps him on his drive to music events. Has been cutting back a bit on alcohol intake.     Goals: To stay consistent; leaning into the wins.         Patient seen by Medical Provider in past 6 months:  no  Requested to schedule appointment with Medical Provider: No        Anthropometric Measurements  Start Weight (#): 297.5  Current Weight (#): 289  TBW % Change from start weight: n/a  Ideal Body Weight (#):166  Goal Weight (#):Just trend in the direction of weight loss   Highest: 299# a few weeks ago   Lowest: 253# with Nutri-system      Weight Loss History  Previous weight loss attempts: Commercial Programs (Weight Watchers, [employer], etc.)  Meal Replacements (Medifast, Slim Fast, etc.)  Self Created Diets (Portion Control, Healthy Food Choices, etc.)     Food and Nutrition Related History  Wake up: 7AM              Bed Time:10PM      Food Recall  Breakfast:Latte with skim milk with tsp of sugar                      Snack: Yogurt with 1/4 cup granola and less than 1/4 cup raisins   Lunch:Could be the yogurt combination    Snack: none  Dinner: Chicken thighs - sheet pan recipes (potatoes, veggies)   Snack:     Beverages: water, 1% milk, 2% milk, coffee/tea, and alcohol (average 1 days per week)   Volume of beverage intake: varies - reactionary (20oz)       Weekends: Same  Cravings: salty/crunchy   Trouble area of day:Eating in front of the TV - triscuits/crackers (mindful eating)      Frequency of Eating out: every 3 days; pub food (hamburger and fries); sit down dinner;   Food restrictions: pistachio, cashew, gina, sunflower seeds   Cooking: wife    Food Shopping: wife      Physical Activity Intake  Activity:Walking, Strength Training, and Stretching  Frequency:frequently  Physical limitations/barriers to exercise: Covid fatigue prior and watching a toddler      Estimated Needs  Energy  Kenia Manley Energy Needs: BMR : 2116   1-2# loss weekly sedentary:   1,539-2,039   1-2# loss weekly lightly active:1,909-2,409  Maintenance calories for sedentary activity level: 2,539  Protein: 90-113g      (1.2-1.5g/kg IBW)  Fluid: 88     (35mL/kg IBW)     Nutrition Diagnosis  Yes;    Overweight/obesity  related to Excess energy intake as evidenced by  BMI more than normative standard for age and sex (obesity-grade III 40+)     Nutrition Intervention     Nutrition Prescription  Calories: 1,600-1,800  Protein: 105-113g  Fluid: 88     Meal Plan (Alcides/Pro/Carb)  Breakfast: 500; 30g PRO  Snack: 100; 5-10g PRO  Lunch: 500; 30g PRO  Snack:  Dinner: 500; 40g PRO  Snack: 200; 5-10g PRO     Nutrition Education:    Calorie controlled menu  Lean protein food choices  Healthy snack options  Food journaling tips        Nutrition Counseling:  Strategies: meal planning, portion sizes, healthy snack choices, hydration, fiber intake, protein intake, exercise, food journal        Monitoring and Evaluation:  Evaluation criteria:  Energy Intake  Meet protein needs  Maintain adequate hydration  Monitor weekly weight  Meal planning/preparation  Food journal   Decreased portions at mealtimes and snacks  Physical activity      Barriers to learning:none  Readiness to change: Action:  (Changing behavior)  Comprehension: excellent  Expected Compliance: excellent

## 2024-05-13 ENCOUNTER — APPOINTMENT (OUTPATIENT)
Dept: RADIOLOGY | Facility: MEDICAL CENTER | Age: 69
End: 2024-05-13
Payer: MEDICARE

## 2024-05-13 ENCOUNTER — OFFICE VISIT (OUTPATIENT)
Dept: URGENT CARE | Facility: MEDICAL CENTER | Age: 69
End: 2024-05-13
Payer: MEDICARE

## 2024-05-13 VITALS
HEIGHT: 70 IN | RESPIRATION RATE: 18 BRPM | SYSTOLIC BLOOD PRESSURE: 118 MMHG | TEMPERATURE: 97.6 F | WEIGHT: 289.4 LBS | BODY MASS INDEX: 41.43 KG/M2 | DIASTOLIC BLOOD PRESSURE: 79 MMHG | HEART RATE: 61 BPM | OXYGEN SATURATION: 99 %

## 2024-05-13 DIAGNOSIS — M25.471 RIGHT ANKLE SWELLING: Primary | ICD-10-CM

## 2024-05-13 DIAGNOSIS — S93.01XA SUBLUXATION OF RIGHT ANKLE JOINT, INITIAL ENCOUNTER: ICD-10-CM

## 2024-05-13 PROCEDURE — G0463 HOSPITAL OUTPT CLINIC VISIT: HCPCS | Performed by: FAMILY MEDICINE

## 2024-05-13 PROCEDURE — 73630 X-RAY EXAM OF FOOT: CPT

## 2024-05-13 PROCEDURE — 99213 OFFICE O/P EST LOW 20 MIN: CPT | Performed by: FAMILY MEDICINE

## 2024-05-13 PROCEDURE — 73610 X-RAY EXAM OF ANKLE: CPT

## 2024-05-13 NOTE — PROGRESS NOTES
"  [facility]        NAME: Gavino Hall is a 68 y.o. male  : 1955    MRN: [id number]  DATE: May 16, 2024  TIME: 5:16 PM    Assessment and Plan   Right ankle swelling [M25.471]  1. Right ankle swelling  XR foot 3+ vw right    XR ankle 3+ vw right    XR ankle 3+ vw right    XR foot 3+ vw right    Ambulatory Referral to Orthopedic Surgery      2. Subluxation of right ankle joint, initial encounter  Ambulatory Referral to Orthopedic Surgery            Patient Instructions       Follow up with PCP in 3-5 days.  Proceed to  ER if symptoms worsen.    If tests have been performed at [facility], our office will contact you with results if changes need to be made to the care plan discussed with you at the visit.  [facility]    Chief Complaint     Chief Complaint   Patient presents with    right ankle pain     Pt states he woke up with right ankle and foot pain two weeks ago. Right ankle is swollen.  Pt denies trauma and any medication changes.  Pt states he did step wrong one month ago which caused his foot to \"roll.\"         History of Present Illness       68-year-old male here today with right ankle pain and swelling for the past 2 weeks.Pt states he woke up with right ankle and foot pain two weeks ago. Right ankle is swollen.  Pt denies trauma and any medication changes.  Pt states he did step wrong one month ago which caused his foot to \"roll.\"        Review of Systems   Review of Systems   Constitutional: Negative.    Musculoskeletal:  Positive for arthralgias, gait problem and joint swelling.         Current Medications       Current Outpatient Medications:     aspirin (Aspirin 81) 81 mg EC tablet, Take 81 mg by mouth daily, Disp: , Rfl:     B Complex CAPS, Take by mouth, Disp: , Rfl:     cetirizine (ZyrTEC Allergy) 10 mg tablet, Take 10 mg by mouth daily, Disp: , Rfl:     Cholecalciferol (Vitamin D) 50 MCG (2000) CAPS, Take 2,000 Units by mouth daily, " Disp: , Rfl:     escitalopram (LEXAPRO) 20 mg tablet, Take 20 mg by mouth daily, Disp: , Rfl:     omeprazole (PriLOSEC) 20 mg delayed release capsule, Take 20 mg by mouth daily, Disp: , Rfl:     escitalopram (LEXAPRO) 10 mg tablet, Take 10 mg by mouth daily (Patient not taking: Reported on 5/13/2024), Disp: , Rfl:     Glucosamine-Chondroitin--200-150 MG TABS, Take 1 tablet by mouth in the morning (Patient not taking: Reported on 5/13/2024), Disp: , Rfl:     Turmeric (QC TUMERIC COMPLEX PO), Take 1 tablet by mouth in the morning (Patient not taking: Reported on 5/13/2024), Disp: , Rfl:     Current Allergies     Allergies as of 05/13/2024 - Reviewed 05/13/2024   Allergen Reaction Noted    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    Nuts - food allergy Anaphylaxis 01/06/2020    Dust mite extract Nasal Congestion 01/01/1958    Grass pollen(k-o-r-t-swt colby) Nasal Congestion 01/01/1958    Hydromorphone Nausea Only 05/03/2014    Other Sneezing 01/22/2013    Pollen extract Sneezing 01/22/2013    Short ragweed pollen ext Sneezing 01/22/2013    Sunflower oil - food allergy Swelling 11/30/2018            The following portions of the patient's history were reviewed and updated as appropriate: allergies, current medications, past family history, past medical history, past social history, past surgical history and problem list.     Past Medical History:   Diagnosis Date    COVID-19     Last Assessment & Plan:    Formatting of this note might be different from the original.   Patient tested positive for COVID. Has Paxlovid at home. Will take 3 tablets 2 times per day for 5 days. Quarantine for 5 days. Advised to call if not resolving or if worsening. Can use over-the-counter Mucinex-D and Robitussin    GERD (gastroesophageal reflux disease)        Past Surgical History:   Procedure Laterality Date    BONE CYST EXCISION  1966    Right humerous    REPLACEMENT TOTAL KNEE  "BILATERAL  2020    Patient has had 3 replacements total. Last in 2020.    VASECTOMY  1990       Family History   Problem Relation Age of Onset    Heart disease Mother     COPD Father     Glaucoma Paternal Grandmother          Medications have been verified.        Objective   /79 (BP Location: Right arm, Patient Position: Sitting)   Pulse 61   Temp 97.6 °F (36.4 °C) (Tympanic Core)   Resp 18   Ht 5' 10\" (1.778 m)   Wt 131 kg (289 lb 6.4 oz)   SpO2 99%   BMI 41.52 kg/m²   No LMP for male patient.       Physical Exam     Physical Exam  Vitals and nursing note reviewed.   Constitutional:       Appearance: Normal appearance.   Musculoskeletal:         General: Swelling, tenderness and deformity present.      Comments: Right lower extremity: Full range on plantarflexion.  Pain on dorsiflexion medial malleolus.  No pain on inversion or eversion.  Significant tenderness over the medial malleolus with effusion of the lateral and medial malleolus.  Gait-antalgic.                   "

## 2024-05-13 NOTE — PATIENT INSTRUCTIONS
Patient patient is in no acute distress.  X-ray of right ankle reveals soft tissue swelling and what seems to be chronic subluxation of the right ankle.  Official radiology interpretation is pending.  Reviewed x-ray findings with orthopedic specialist on-call, Dr. Robin Andrade.  He recommended the patient be placed in a cam boot for support and compression, avoid weightbearing.  Apply ice as needed, take OTC NSAIDs.  Patient given outpatient referral for Dr. James Lachman, orthopedic ankle specialist.    Ankle Sprain   WHAT YOU NEED TO KNOW:   An ankle sprain happens when 1 or more ligaments in your ankle joint stretch or tear. Ligaments are tough tissues that connect bones. Ligaments support your joints and keep your bones in place.  DISCHARGE INSTRUCTIONS:   Return to the emergency department if:   You have severe pain in your ankle.    Your foot or toes are cold or numb.    Your ankle becomes more weak or unstable (wobbly).    You are unable to put any weight on your ankle or foot.    Your swelling has increased or returned.    Call your doctor if:   Your pain does not go away, even after treatment.    You have questions or concerns about your condition or care.    Medicines:  You may need any of the following:  NSAIDs , such as ibuprofen, help decrease swelling, pain, and fever. This medicine is available with or without a doctor's order. NSAIDs can cause stomach bleeding or kidney problems in certain people. If you take blood thinner medicine, always ask your healthcare provider if NSAIDs are safe for you. Always read the medicine label and follow directions.    Acetaminophen  decreases pain and fever. It is available without a doctor's order. Ask how much to take and how often to take it. Follow directions. Read the labels of all other medicines you are using to see if they also contain acetaminophen, or ask your doctor or pharmacist. Acetaminophen can cause liver damage if not taken  correctly.    Prescription pain medicine  may be given. Ask your healthcare provider how to take this medicine safely. Some prescription pain medicines contain acetaminophen. Do not take other medicines that contain acetaminophen without talking to your healthcare provider. Too much acetaminophen may cause liver damage. Prescription pain medicine may cause constipation. Ask your healthcare provider how to prevent or treat constipation.     Take your medicine as directed.  Contact your healthcare provider if you think your medicine is not helping or if you have side effects. Tell your provider if you are allergic to any medicine. Keep a list of the medicines, vitamins, and herbs you take. Include the amounts, and when and why you take them. Bring the list or the pill bottles to follow-up visits. Carry your medicine list with you in case of an emergency.    Self-care:   Use support devices , such as a brace, cast, or splint, to limit your movement and protect your joint. You may need to use crutches to decrease your pain as you move around.    Go to physical therapy  as directed. A physical therapist teaches you exercises to help improve movement and strength, and to decrease pain.    Rest  your ankle so that it can heal. Return to normal activities as directed.    Apply ice  on your ankle for 15 to 20 minutes every hour or as directed. Use an ice pack, or put crushed ice in a plastic bag. Cover the ice pack or bag with a towel before you put it on your injury. Ice helps prevent tissue damage and decreases swelling and pain.    Compress  your ankle. Ask if you should wrap an elastic bandage around your injured ligament. An elastic bandage provides support and helps decrease swelling and movement so your joint can heal. Wear as long as directed.         Elevate  your ankle above the level of your heart as often as you can. This will help decrease swelling and pain. Prop your ankle on pillows or blankets to keep it  elevated comfortably.       Prevent another ankle sprain:   Let your ankle heal.  Find out how long your ligament needs to heal. Do not do any physical activity until your healthcare provider says it is okay. If you start activity too soon, you may develop a more serious injury.    Warm up and stretch before you exercise or play sports.  This helps your joints become strong and flexible.    Use the right equipment.  Always wear shoes that fit well and are made for the activity that you are doing. You may also need ankle supports, elbow and knee pads, or braces.    Follow up with your doctor as directed:  Write down your questions so you remember to ask them during your visits.  © Copyright [employer] 2023 Information is for End User's use only and may not be sold, redistributed or otherwise used for commercial purposes.  The above information is an  only. It is not intended as medical advice for individual conditions or treatments. Talk to your doctor, nurse or pharmacist before following any medical regimen to see if it is safe and effective for you.

## 2024-05-20 ENCOUNTER — TELEPHONE (OUTPATIENT)
Age: 69
End: 2024-05-20

## 2024-05-20 DIAGNOSIS — Z79.2 NEED FOR PROPHYLACTIC ANTIBIOTIC: Primary | ICD-10-CM

## 2024-05-20 RX ORDER — AMOXICILLIN 500 MG/1
2000 CAPSULE ORAL ONCE
Qty: 4 CAPSULE | Refills: 0 | Status: SHIPPED | OUTPATIENT
Start: 2024-05-20 | End: 2024-05-20

## 2024-05-20 NOTE — TELEPHONE ENCOUNTER
Pt called requesting antibiotics for a dental procedure that will take place on Wednesday, May 22nd.     Pt normally receives 4 tablets of Amoxicillin 500mg prior to procedure.     Pt would like a return call when med is sent to pharm please.     Please advise.

## 2024-05-21 ENCOUNTER — OFFICE VISIT (OUTPATIENT)
Dept: OBGYN CLINIC | Facility: CLINIC | Age: 69
End: 2024-05-21
Payer: MEDICARE

## 2024-05-21 VITALS — BODY MASS INDEX: 41.23 KG/M2 | WEIGHT: 288 LBS | HEIGHT: 70 IN

## 2024-05-21 DIAGNOSIS — M19.171 POST-TRAUMATIC ARTHRITIS OF RIGHT ANKLE: Primary | ICD-10-CM

## 2024-05-21 DIAGNOSIS — M21.6X1 HINDFOOT VARUS, ACQUIRED, RIGHT: ICD-10-CM

## 2024-05-21 PROCEDURE — 20605 DRAIN/INJ JOINT/BURSA W/O US: CPT | Performed by: ORTHOPAEDIC SURGERY

## 2024-05-21 PROCEDURE — 99204 OFFICE O/P NEW MOD 45 MIN: CPT | Performed by: ORTHOPAEDIC SURGERY

## 2024-05-21 RX ORDER — TRIAMCINOLONE ACETONIDE 40 MG/ML
40 INJECTION, SUSPENSION INTRA-ARTICULAR; INTRAMUSCULAR
Status: COMPLETED | OUTPATIENT
Start: 2024-05-21 | End: 2024-05-21

## 2024-05-21 RX ADMIN — TRIAMCINOLONE ACETONIDE 40 MG: 40 INJECTION, SUSPENSION INTRA-ARTICULAR; INTRAMUSCULAR at 12:30

## 2024-05-21 NOTE — PROGRESS NOTES
James R Lachman, M.D.  Attending, Orthopaedic Surgery  Foot and Ankle  [facility]        ORTHOPAEDIC FOOT AND ANKLE CLINIC VISIT     Assessment:     Encounter Diagnoses   Name Primary?    Post-traumatic arthritis of right ankle Yes    Hindfoot varus, acquired, right         Plan:   The patient verbalized understanding of exam findings and treatment plan. We engaged in the shared decision-making process and treatment options were discussed at length with the patient. Surgical and conservative management discussed today along with risks and benefits.  Patient has end stage varus right ankle arthritis. The pathoanatomy and natural history was explained to the patient in detail today in the office.   He was provided with a cortisone injection today into his right ankle. This is documented appropriately below. Also, explained to the patient that we can not perform a surgery for 4 months after this injection.   He will obtain an [place] brace as well. Proper instructions on how to obtain and then wear the brace were provided for the patient today  Discontinue the use of the CAM boot and transition into a supportive sneaker as soon as possible.   Return in about 3 months (around 8/21/2024) for Arizona brace check.    Medium joint arthrocentesis: R ankle  Universal Protocol:  Consent: Verbal consent obtained.  Risks and benefits: risks, benefits and alternatives were discussed  Consent given by: patient  Site marked: the operative site was marked  Radiology Images displayed and confirmed. If images not available, report reviewed: imaging studies available  Patient identity confirmed: verbally with patient  Supporting Documentation  Indications: pain and diagnostic evaluation   Procedure Details  Location: ankle - R ankle  Preparation: Patient was prepped and draped in the usual sterile fashion  Needle size: 22 G  Ultrasound guidance: no  Approach: anterolateral  Medications administered: 40 mg  "triamcinolone acetonide 40 mg/mL    Patient tolerance: patient tolerated the procedure well with no immediate complications  Dressing:  Sterile dressing applied              History of Present Illness:   Chief Complaint:   Chief Complaint   Patient presents with    Right Ankle - Pain     Went to urgent care last week. In cam boot and walking on it. No injury to it. Pain came on over a month. No numbness or tingling.      Gavino Hall is a 68 y.o. male who is being seen for right ankle pain. Patient reports that his ankle pain began atraumatically about 1 month ago and believes he may have \"rolled\" it 2 weeks ago causing an increase in pain.  Pain is localized at medial and lateral ankle with minimal radiating and described as sharp and severe. Patient denies numbness, tingling or radicular pain.  Denies history of neuropathy.  Patient does not smoke, does not have diabetes and does take blood thinners.  Patient denies family history of anesthesia complications and has not had any complications with anesthesia.     Pain/symptom timing:  Worse during the day when active  Pain/symptom context:  Worse with activites and work  Pain/symptom modifying factors:  Rest makes better, activities make worse  Pain/symptom associated signs/symptoms: none    Prior treatment   NSAIDsYes    Injections No   Bracing/Orthotics Yes CAM boot  Physical Therapy No     Orthopedic Surgical History:   See below    Past Medical, Surgical and Social History:  Past Medical History:  has a past medical history of COVID-19 and GERD (gastroesophageal reflux disease).  Problem List: does not have any pertinent problems on file.  Past Surgical History:  has a past surgical history that includes Replacement total knee bilateral (2020); Vasectomy (1990); and Bone cyst excision (1966).  Family History: family history includes COPD in his father; Glaucoma in his paternal grandmother; Heart disease in his mother.  Social History:  reports that he has " "never smoked. He has never used smokeless tobacco. He reports current alcohol use of about 6.0 standard drinks of alcohol per week. He reports that he does not use drugs.  Current Medications: has a current medication list which includes the following prescription(s): aspirin, b complex, cetirizine, vitamin d, escitalopram, omeprazole, escitalopram, glucosamine-chondroitin-msm, and turmeric.  Allergies: is allergic to cashew nut oil - food allergy, mangifera indica, nuts - food allergy, dust mite extract, grass pollen(k-o-r-t-swt colby), hydromorphone, other, pollen extract, short ragweed pollen ext, and sunflower oil - food allergy.     Review of Systems:  General- denies fever/chills  HEENT- denies hearing loss or sore throat  Eyes- denies eye pain or visual disturbances, denies red eyes  Respiratory- denies cough or SOB  Cardio- denies chest pain or palpitations  GI- denies abdominal pain  Endocrine- denies urinary frequency  Urinary- denies pain with urination  Musculoskeletal- Negative except noted above  Skin- denies rashes or wounds  Neurological- denies dizziness or headache  Psychiatric- denies anxiety or difficulty concentrating    Physical Exam:   Ht 5' 10\" (1.778 m)   Wt 131 kg (288 lb)   BMI 41.32 kg/m²   General/Constitutional: No apparent distress: well-nourished and well developed.  Eyes: normal ocular motion  Cardio: RRR, Normal S1S2, No m/r/g  Lymphatic: No appreciable lymphadenopathy  Respiratory: Non-labored breathing, CTA b/l no w/c/r  Vascular: No edema, swelling or tenderness, except as noted in detailed exam.  Integumentary: No impressive skin lesions present, except as noted in detailed exam.  Neuro: No ataxia or tremors noted  Psych: Normal mood and affect, oriented to person, place and time. Appropriate affect.  Musculoskeletal: Normal, except as noted in detailed exam and in HPI.    Examination    Right    Gait Antalgic in CAM boot   Musculoskeletal Tender to palpation at medial ankle  "   Skin Normal.      Nails Normal    Range of Motion  15 degrees dorsiflexion, 25 degrees plantarflexion  Subtalar motion: normal    Stability Stable    Muscle Strength 5/5 tibialis anterior  5/5 gastrocnemius-soleus  5/5 posterior tibialis  5/5 peroneal/eversion strength  5/5 EHL  5/5 FHL    Neurologic Normal    Sensation Intact to light touch throughout sural, saphenous, superficial peroneal, deep peroneal and medial/lateral plantar nerve distributions.  Huron-Tc 5.07 filament (10g) testing deferred.    Cardiovascular Brisk capillary refill < 2 seconds,intact DP and PT pulses    Special Tests None      Imaging Studies:   3 views of the right ankle were taken, reviewed and interpreted independently that demonstrate end stage varus ankle arthritis. Reviewed by me personally.        James R. Lachman, MD  Foot & Ankle Surgery   Department of Orthopaedic Surgery  [facility]      I personally performed the service.    James R. Lachman, MD    Scribe Attestation      I,:  Rene Estevez am acting as a scribe while in the presence of the attending physician.:       I,:  James R Lachman, MD personally performed the services described in this documentation    as scribed in my presence.:

## 2024-05-21 NOTE — PATIENT INSTRUCTIONS
During your appointment today, we injected your right ankle joint with a pain medication and steroids.  It is common to immediately feel pain relief after the injection.  This is temporary as the pain medication in the injection can wear off in 4-8 hours.  Often, the pain returns later in the day and this is normal.  The steroid can take 24-48 hours to kick in.    Common complaints;  1. Pain at the injection site (like a bee sting)- use ice for 20 minutes at a time, taking 40 minutes break between ice sessions, to alleviate this discomfort  2. Skin discoloration- this is a common side-effect of the steroid and can be a permanent skin color change.  3. Blood sugar elevation- occasionally, steroid injections can effect blood sugar in diabetic patients.  We recommend monitoring your blood sugar over the next 3 days if you are diabetic.    If the injection works, even if only for 20 minutes, it means that we have isolated the pain generator (i.e. The injection was diagnositic). The hope is that the injection provides prolonged pain relief (i.e. The injection was therapeutic).  If you get better but the pain returns, it is a good sign that a surgery to correct your problem is very likely to eliminate the pain permanently.    Today, We recommended a brace/prosthesis for you. [facility] certified pedorthotists make orthotics but not braces or prosthesis.  Below are the companies in the area that make these, Please call ahead for an appointment and to ensure the company accepts your insurance. Make sure to bring the prescription given to you in the office today to the company for the brace/prosthesis.    [facility]  [address] [place] [address]  Directions  [place] Phone: [phone]  [place] Fax: [phone]    [place] [address]  [address]  [place] [address]  (By Appointment Only)  Directions  [address]  [address]  [place] [address]  Jaimie OLMOS  Phone: [phone]  [place] Fax: [phone]    [facility]    [place] Location  [address]  [place] [address]  [phone] (fax)    [place] Location  [address]  [place] [address]  [phone] (fax)    [place] Location  [address]  [place] [address]  [phone] (fax)     Clinic    [place] Location  [address]  [place] [address]  [phone] (fax)    [place] Location  [address]  [place] [id number] [phone] (fax)    [employer], [employer], [employer] are good brands but I recommend going to a dedicate shoe store (not [employer] or [employer].) At these types of stores, they have experts that can fit you for shoes appropriate for your foot problem. Shoe choice is essential to solving/improving most types of foot pain.  Even after a surgery, good shoes are necessary to keep the foot as comfortable as possible.    [employer]  [address] [address]  [phone]    [address], [place] [address]  [phone]    [employer]  [address] [place] [address] [phone]    [employer]   [address]  [phone]    [employer]  [address] [place] [address] [address], [place] [address] [phone]    [employer]  [address] [place], [place] [id number] [phone]    [facility]   [address] [place], [place] [address] [phone]    [employer]  [address] [place], [place]  [phone]    [employer] [address], [address]  [phone]    [employer] [address], [place] [address] [phone]

## 2024-05-22 ENCOUNTER — CLINICAL SUPPORT (OUTPATIENT)
Dept: BARIATRICS | Facility: CLINIC | Age: 69
End: 2024-05-22

## 2024-05-22 VITALS — HEIGHT: 70 IN | BODY MASS INDEX: 40.99 KG/M2 | WEIGHT: 286.3 LBS

## 2024-05-22 DIAGNOSIS — R63.5 ABNORMAL WEIGHT GAIN: Primary | ICD-10-CM

## 2024-05-22 PROCEDURE — RECHECK

## 2024-05-22 PROCEDURE — WMDI30

## 2024-05-22 NOTE — PROGRESS NOTES
Weight Management Medical Nutrition Assessment  Yasmeen current weight 286# at follow up - with shoes on. Lost 3#. Continuing to putting protein powder in coffee in the AM with a yogurt. Casey is not waiting til he is hungry. Consuming 2 slices of turkey breast and a slice of cheese and Mariusz Killer Light bread. End state arthritis in the ankle area and is taking anti-inflammation injection for this and will manage. Alcohol intake is doing well and limiting. Overall weight loss is 10.5#. Biggest challenge is vacations coming up.     Goals: To stay consistent and to continue expanding on making creative dinners     Will follow up in one month, after [place] trip        Patient seen by Medical Provider in past 6 months:  no  Requested to schedule appointment with Medical Provider: No        Anthropometric Measurements  Start Weight (#): 297.5  Current Weight (#): 286  TBW % Change from start weight: n/a  Ideal Body Weight (#):166  Goal Weight (#):Just trend in the direction of weight loss   Highest: 299# a few weeks ago   Lowest: 253# with Nutri-system      Weight Loss History  Previous weight loss attempts: Commercial Programs (Weight Watchers, MiiPharos, etc.)  Meal Replacements (Medifast, Slim Fast, etc.)  Self Created Diets (Portion Control, Healthy Food Choices, etc.)     Food and Nutrition Related History  Wake up: 7AM              Bed Time:10PM      Food Recall  Breakfast:Latte with skim milk with tsp of sugar ; coffee with 30g PRO powder                   Snack: Yogurt with 1/4 cup granola and less than 1/4 cup raisins or sometimes satisfied  Lunch:Could be the yogurt combination; Mariusz bread with 2 slices of oven roasted turkey and 2 cabot cheese cubes   Snack: none  Dinner: Chicken thighs - sheet pan recipes (potatoes, veggies);Rice and beans; rotisserie chicken, rice, tomatoes   Snack: might have string cheese if hungry     Beverages: water, 1% milk, 2% milk, coffee/tea, and alcohol (average 1 days per  week)   Volume of beverage intake: varies - reactionary (20oz)      Weekends: Same  Cravings: salty/crunchy   Trouble area of day:Eating in front of the TV - triscuits/crackers (mindful eating)      Frequency of Eating out: every 3 days; pub food (hamburger and fries); sit down dinner;   Food restrictions: pistachio, cashew, gina, sunflower seeds   Cooking: wife    Food Shopping: wife      Physical Activity Intake  Activity:Walking, Strength Training, and Stretching  Frequency:frequently  Physical limitations/barriers to exercise: Covid fatigue prior and watching a toddler      Estimated Needs  Energy  Kenia Manley Energy Needs: BMR : 2116   1-2# loss weekly sedentary:   1,539-2,039   1-2# loss weekly lightly active:1,909-2,409  Maintenance calories for sedentary activity level: 2,539  Protein: 90-113g      (1.2-1.5g/kg IBW)  Fluid: 88     (35mL/kg IBW)     Nutrition Diagnosis  Yes;    Overweight/obesity  related to Excess energy intake as evidenced by  BMI more than normative standard for age and sex (obesity-grade III 40+)     Nutrition Intervention     Nutrition Prescription  Calories: 1,600-1,800  Protein: 105-113g  Fluid: 88     Meal Plan (Alcides/Pro/Carb)  Breakfast: 500; 30g PRO  Snack: 100; 5-10g PRO  Lunch: 500; 30g PRO  Snack:  Dinner: 500; 40g PRO  Snack: 200; 5-10g PRO     Nutrition Education:    Calorie controlled menu  Lean protein food choices  Healthy snack options  Food journaling tips        Nutrition Counseling:  Strategies: meal planning, portion sizes, healthy snack choices, hydration, fiber intake, protein intake, exercise, food journal        Monitoring and Evaluation:  Evaluation criteria:  Energy Intake  Meet protein needs  Maintain adequate hydration  Monitor weekly weight  Meal planning/preparation  Food journal   Decreased portions at mealtimes and snacks  Physical activity      Barriers to learning:none  Readiness to change: Action:  (Changing behavior)  Comprehension:  excellent  Expected Compliance: excellent

## 2024-05-31 DIAGNOSIS — U07.1 COVID-19: Primary | ICD-10-CM

## 2024-05-31 RX ORDER — NIRMATRELVIR AND RITONAVIR 300-100 MG
3 KIT ORAL 2 TIMES DAILY
Qty: 30 TABLET | Refills: 0 | Status: SHIPPED | OUTPATIENT
Start: 2024-05-31 | End: 2024-06-05

## 2024-06-28 ENCOUNTER — CLINICAL SUPPORT (OUTPATIENT)
Dept: BARIATRICS | Facility: CLINIC | Age: 69
End: 2024-06-28

## 2024-06-28 VITALS — WEIGHT: 291.3 LBS | BODY MASS INDEX: 41.7 KG/M2 | HEIGHT: 70 IN

## 2024-06-28 DIAGNOSIS — R63.5 ABNORMAL WEIGHT GAIN: Primary | ICD-10-CM

## 2024-06-28 PROCEDURE — RECHECK

## 2024-06-28 PROCEDURE — WMDI30

## 2024-06-28 NOTE — PROGRESS NOTES
"Weight Management Medical Nutrition Assessment  Yasmeen current weight 291.3# at follow up - with shoes on. There was a slight increase of overall body weight for this visit, which is most likely due to summer travel and increased portion sizes when consuming evening meals. We discussed making sure Casey consumes enough protein and overall calories during the day which can help prevent him from over indulging in the PM hours. Increasing his fiber might help as well to mask the feeling of hunger with \"bulk\" (veggies are mostly water and fiber, so filling up on low calorie foods can help mask the feeling of hunger and potentially help us think we are full). Casey also took home some New Direction samples to try for a snack or when in a pinch as a meal. He is leaving for a 2 week cruise which he has permission to enjoy foods and know his weight loss journey is a long term lifestyle change. Overall, he knows what to do and will continue to stay positive. He will follow up in 6 weeks.       Patient seen by Medical Provider in past 6 months:  no  Requested to schedule appointment with Medical Provider: No        Anthropometric Measurements  Start Weight (#): 297.5  Current Weight (#): 291  TBW % Change from start weight: n/a  Ideal Body Weight (#):166  Goal Weight (#):Just trend in the direction of weight loss   Highest: 299# a few weeks ago   Lowest: 253# with Nutri-system      Weight Loss History  Previous weight loss attempts: Commercial Programs (Weight Watchers, Lelo Dom, etc.)  Meal Replacements (Medifast, Slim Fast, etc.)  Self Created Diets (Portion Control, Healthy Food Choices, etc.)     Food and Nutrition Related History  Wake up: 7AM              Bed Time:10PM      Food Recall  Breakfast:Latte with skim milk with tsp of sugar ; coffee with 30g PRO powder                   Snack: Yogurt with 1/4 cup granola and less than 1/4 cup raisins or sometimes satisfied  Lunch:Could be the yogurt combination; Mariusz " bread with 2 slices of oven roasted turkey and 2 cabot cheese cubes   Snack: none  Dinner: Chicken thighs - sheet pan recipes (potatoes, veggies);Rice and beans; rotisserie chicken, rice, tomatoes   Snack: might have string cheese if hungry     Beverages: water, 1% milk, 2% milk, coffee/tea, and alcohol (average 1 days per week)   Volume of beverage intake: varies - reactionary (20oz)      Weekends: Same  Cravings: salty/crunchy   Trouble area of day:Eating in front of the TV - triscuits/crackers (mindful eating)      Frequency of Eating out: every 3 days; pub food (hamburger and fries); sit down dinner;   Food restrictions: pistachio, cashew, gina, sunflower seeds   Cooking: wife    Food Shopping: wife      Physical Activity Intake  Activity:Walking, Strength Training, and Stretching  Frequency:frequently  Physical limitations/barriers to exercise: Covid fatigue prior and watching a toddler      Estimated Needs  Energy  Jenkins St Jeor Energy Needs: BMR : 2116   1-2# loss weekly sedentary:   1,539-2,039   1-2# loss weekly lightly active:1,909-2,409  Maintenance calories for sedentary activity level: 2,539  Protein: 90-113g      (1.2-1.5g/kg IBW)  Fluid: 88     (35mL/kg IBW)     Nutrition Diagnosis  Yes;    Overweight/obesity  related to Excess energy intake as evidenced by  BMI more than normative standard for age and sex (obesity-grade III 40+)     Nutrition Intervention     Nutrition Prescription  Calories: 1,600-1,800  Protein: 105-113g  Fluid: 88     Meal Plan (Alcides/Pro/Carb)  Breakfast: 500; 30g PRO  Snack: 100; 5-10g PRO  Lunch: 500; 30g PRO  Snack:  Dinner: 500; 40g PRO  Snack: 200; 5-10g PRO     Nutrition Education:    Calorie controlled menu  Lean protein food choices  Healthy snack options  Food journaling tips        Nutrition Counseling:  Strategies: meal planning, portion sizes, healthy snack choices, hydration, fiber intake, protein intake, exercise, food journal        Monitoring and  Evaluation:  Evaluation criteria:  Energy Intake  Meet protein needs  Maintain adequate hydration  Monitor weekly weight  Meal planning/preparation  Food journal   Decreased portions at mealtimes and snacks  Physical activity      Barriers to learning:none  Readiness to change: Action:  (Changing behavior)  Comprehension: excellent  Expected Compliance: excellent

## 2024-08-07 ENCOUNTER — CLINICAL SUPPORT (OUTPATIENT)
Dept: BARIATRICS | Facility: CLINIC | Age: 69
End: 2024-08-07

## 2024-08-07 VITALS — HEIGHT: 70 IN | WEIGHT: 290 LBS | BODY MASS INDEX: 41.52 KG/M2

## 2024-08-07 DIAGNOSIS — R63.5 ABNORMAL WEIGHT GAIN: Primary | ICD-10-CM

## 2024-08-07 PROCEDURE — RECHECK

## 2024-08-07 PROCEDURE — WMDI30

## 2024-08-07 NOTE — PROGRESS NOTES
Weight Management Medical Nutrition Assessment  Yasmeen current weight 290# at follow up - with shoes on.     Has had significant round of Covid, and has recently gotten his granddaughters cold. Quite a few vacations. But overall the good news is that he did maintain his weight.   Started getting back on track yesterday with some strategies we discussed before.   Eduardo will be on vacation with his family for a week but feels he will be in a good place in terms of finding good meals and snacks.   We discussed focusing on fiber the next few weeks which include fruits, veggies, ground flaxseed, nuts/seeds, avocado, cereals such as Kashi and Fiber One, beans and legumes, wheat germ, higher fiber breads (647), whole wheat pasta, banza/chickpea pasta, brown rice, quinoa, barley, and farro.     Eduardo's goal is to add more fiber to each meal or snack over the next few weeks.   Ex: if having scrambled eggs, add some spinach, tomato, and mushrooms  Ex: if having oatmeal, add some walnuts, ground flaxseed, and blueberries  Ex: If having a sandwich, use 647 bread and add hummus, avocado, spinach, sliced tomato       Patient seen by Medical Provider in past 6 months:  no  Requested to schedule appointment with Medical Provider: No        Anthropometric Measurements  Start Weight (#): 297.5  Current Weight (#): 290  TBW % Change from start weight: n/a  Ideal Body Weight (#):166  Goal Weight (#):Just trend in the direction of weight loss   Highest: 299# a few weeks ago   Lowest: 253# with Nutri-system      Weight Loss History  Previous weight loss attempts: Commercial Programs (Weight Watchers, Lelo Dom, etc.)  Meal Replacements (Medifast, Slim Fast, etc.)  Self Created Diets (Portion Control, Healthy Food Choices, etc.)     Food and Nutrition Related History  Wake up: 7AM              Bed Time:10PM      Food Recall  Breakfast:Latte with skim milk with tsp of sugar ; coffee with 30g PRO powder                   Snack: Yogurt with  1/4 cup granola and less than 1/4 cup raisins or sometimes satisfied  Lunch:Could be the yogurt combination; Mariusz bread with 2 slices of oven roasted turkey and 2 cabot cheese cubes   Snack: none  Dinner: Chicken thighs - sheet pan recipes (potatoes, veggies);Rice and beans; rotisserie chicken, rice, tomatoes   Snack: might have string cheese if hungry     Beverages: water, 1% milk, 2% milk, coffee/tea, and alcohol (average 1 days per week)   Volume of beverage intake: varies - reactionary (20oz)      Weekends: Same  Cravings: salty/crunchy   Trouble area of day:Eating in front of the TV - triscuits/crackers (mindful eating)      Frequency of Eating out: every 3 days; pub food (hamburger and fries); sit down dinner;   Food restrictions: pistachio, cashew, gina, sunflower seeds   Cooking: wife    Food Shopping: wife      Physical Activity Intake  Activity:Walking, Strength Training, and Stretching  Frequency:frequently  Physical limitations/barriers to exercise: Covid fatigue prior and watching a toddler      Estimated Needs  Energy  Kenia Manley Energy Needs: BMR : 2116   1-2# loss weekly sedentary:   1,539-2,039   1-2# loss weekly lightly active:1,909-2,409  Maintenance calories for sedentary activity level: 2,539  Protein: 90-113g      (1.2-1.5g/kg IBW)  Fluid: 88     (35mL/kg IBW)     Nutrition Diagnosis  Yes;    Overweight/obesity  related to Excess energy intake as evidenced by  BMI more than normative standard for age and sex (obesity-grade III 40+)     Nutrition Intervention     Nutrition Prescription  Calories: 1,600-1,800  Protein: 105-113g  Fluid: 88     Meal Plan (Alcides/Pro/Carb)  Breakfast: 500; 30g PRO  Snack: 100; 5-10g PRO  Lunch: 500; 30g PRO  Snack:  Dinner: 500; 40g PRO  Snack: 200; 5-10g PRO     Nutrition Education:    Calorie controlled menu  Lean protein food choices  Healthy snack options  Food journaling tips        Nutrition Counseling:  Strategies: meal planning, portion sizes, healthy  snack choices, hydration, fiber intake, protein intake, exercise, food journal        Monitoring and Evaluation:  Evaluation criteria:  Energy Intake  Meet protein needs  Maintain adequate hydration  Monitor weekly weight  Meal planning/preparation  Food journal   Decreased portions at mealtimes and snacks  Physical activity      Barriers to learning:none  Readiness to change: Action:  (Changing behavior)  Comprehension: excellent  Expected Compliance: excellent

## 2024-08-28 ENCOUNTER — OFFICE VISIT (OUTPATIENT)
Dept: OBGYN CLINIC | Facility: CLINIC | Age: 69
End: 2024-08-28
Payer: MEDICARE

## 2024-08-28 VITALS
SYSTOLIC BLOOD PRESSURE: 115 MMHG | HEART RATE: 69 BPM | HEIGHT: 70 IN | BODY MASS INDEX: 41.52 KG/M2 | DIASTOLIC BLOOD PRESSURE: 77 MMHG | WEIGHT: 290 LBS

## 2024-08-28 DIAGNOSIS — M19.171 POST-TRAUMATIC ARTHRITIS OF RIGHT ANKLE: Primary | ICD-10-CM

## 2024-08-28 DIAGNOSIS — M21.6X1 HINDFOOT VARUS, ACQUIRED, RIGHT: ICD-10-CM

## 2024-08-28 PROCEDURE — 99213 OFFICE O/P EST LOW 20 MIN: CPT | Performed by: ORTHOPAEDIC SURGERY

## 2024-08-28 NOTE — PATIENT INSTRUCTIONS
[employer], [employer], [employer] are good brands but I recommend going to a dedicate shoe store (not [employer] or [employer].) At these types of stores, they have experts that can fit you for shoes appropriate for your foot problem. Shoe choice is essential to solving/improving most types of foot pain.  Even after a surgery, good shoes are necessary to keep the foot as comfortable as possible.    [employer]  [address] [place] [address]  [phone]    [facility]  [address], [place] [address]  [phone]    [employer]  [address] [place] [address]  [phone]    [employer]   [address]  [phone]    [employer]  [address], [place] [address]  [phone]    [employer]  [address] [phone]    [facility] [employer] [facility]   [address] [place] [address]  [phone]    [employer]  [address] [place] [place]  [phone]    [employer] [address], [id number] [phone]    [employer] [address], [address], [place] [address] [phone]

## 2024-08-28 NOTE — PROGRESS NOTES
James R Lachman, M.D.  Attending, Orthopaedic Surgery  Foot and Ankle  [facility]      ORTHOPAEDIC FOOT AND ANKLE CLINIC VISIT     Assessment:     Encounter Diagnoses   Name Primary?    Post-traumatic arthritis of right ankle Yes    Hindfoot varus, acquired, right         Plan:   The patient verbalized understanding of exam findings and treatment plan. We engaged in the shared decision-making process and treatment options were discussed at length with the patient. Surgical and conservative management discussed today along with risks and benefits.  Patient has end-stage varus right ankle arthritis   Received steroid injection last visit 5/21/2024- reports he had about 2-3 weeks of relief  [place] brace was recommended last visit- he has been wearing brace for about 1 month. Feels better supported wearing brace however notes minimal pain relief  He will optimize his shoes with the brace to maximize benefit.  He would be a candidate for TAA with Brostrom, deltoid release, achilles lengthening, possible Malerba, possible dorsiflexion 1st Ray, possible plantar fascia release, possible PL to PB tendon transfer as necessary  See back PRN      History of Present Illness:   Chief Complaint:   Chief Complaint   Patient presents with    Follow-up     Follow up of the right ankle. Brace check. Likes it still bothering him at night a little.      Gavion Hall is a 68 y.o. adult who is being seen in follow-up for Right ankle arthritis. When we last saw he we recommended [place] brace.  Pain has minimally improved. Residual pain is localized at anteromedial ankle with minimal radiating and described as sharp and severe. Patient has been wearing brace for almost 1 month. Feels better supported but not much pain relief. Reports he is not wearing brace all day everyday, takes it off at home when walking around the house.     Pain/symptom timing:  Worse during the day when active  Pain/symptom context:   Worse with activites and work  Pain/symptom modifying factors:  Rest makes better, activities make worse  Pain/symptom associated signs/symptoms: none    Prior treatment   NSAIDsYes   Injections Yes   Bracing/Orthotics Yes    Physical Therapy No     Orthopedic Surgical History:   See below     Past Medical, Surgical and Social History:  Past Medical History:  has a past medical history of COVID-19 and GERD (gastroesophageal reflux disease).  Problem List: does not have any pertinent problems on file.  Past Surgical History:  has a past surgical history that includes Replacement total knee bilateral (2020); Vasectomy (1990); and Bone cyst excision (1966).  Family History: family history includes COPD in his father; Glaucoma in his paternal grandmother; Heart disease in his mother.  Social History:  reports that he has never smoked. He has never used smokeless tobacco. He reports current alcohol use of about 6.0 standard drinks of alcohol per week. He reports that he does not use drugs.  Current Medications: has a current medication list which includes the following prescription(s): aspirin, b complex, cetirizine, vitamin d, escitalopram, omeprazole, and turmeric.  Allergies: is allergic to cashew nut oil - food allergy, mangifera indica, nuts - food allergy, dust mite extract, grass pollen(k-o-r-t-swt colby), hydromorphone, other, pollen extract, short ragweed pollen ext, and sunflower oil - food allergy.     Review of Systems:  General- denies fever/chills  HEENT- denies hearing loss or sore throat  Eyes- denies eye pain or visual disturbances, denies red eyes  Respiratory- denies cough or SOB  Cardio- denies chest pain or palpitations  GI- denies abdominal pain  Endocrine- denies urinary frequency  Urinary- denies pain with urination  Musculoskeletal- Negative except noted above  Skin- denies rashes or wounds  Neurological- denies dizziness or headache  Psychiatric- denies anxiety or difficulty  "concentrating    Physical Exam:   /77   Pulse 69   Ht 5' 10\" (1.778 m)   Wt 132 kg (290 lb)   BMI 41.61 kg/m²   General/Constitutional: No apparent distress: well-nourished and well developed.  Eyes: normal ocular motion  Lymphatic: No appreciable lymphadenopathy  Respiratory: Non-labored breathing  Vascular: No edema, swelling or tenderness, except as noted in detailed exam.  Integumentary: No impressive skin lesions present, except as noted in detailed exam.  Neuro: No ataxia or tremors noted  Psych: Normal mood and affect, oriented to person, place and time. Appropriate affect.  Musculoskeletal: Normal, except as noted in detailed exam and in HPI.    Examination    Right    Gait Antalgic   Musculoskeletal Tender to palpation at anteromedial ankle    Skin Normal.      Nails Normal    Range of Motion  20 degrees dorsiflexion, 30 degrees plantarflexion  Subtalar motion: normal    Stability Stable    Muscle Strength 5/5 tibialis anterior  5/5 gastrocnemius-soleus  5/5 posterior tibialis  5/5 peroneal/eversion strength  5/5 EHL  5/5 FHL    Neurologic Normal    Sensation  Intact to light touch throughout sural, saphenous, superficial peroneal, deep peroneal and medial/lateral plantar nerve distributions.  [place]-Tc 5.07 filament (10g) testing  deferred.    Cardiovascular Brisk capillary refill < 2 seconds,intact DP and PT pulses    Special Tests None      Imaging Studies:   No new imaging    Scribe Attestation      I,:  Siomara Layton PA-C am acting as a scribe while in the presence of the attending physician.:       I,:  James R Lachman, MD personally performed the services described in this documentation    as scribed in my presence.:           James R. Lachman, MD  Foot & Ankle Surgery   Department of Orthopaedic Surgery  [facility]      I personally performed the service.    James R. Lachman, MD   "

## 2024-09-04 ENCOUNTER — TELEPHONE (OUTPATIENT)
Dept: BARIATRICS | Facility: CLINIC | Age: 69
End: 2024-09-04

## 2024-09-09 ENCOUNTER — TELEPHONE (OUTPATIENT)
Dept: OBGYN CLINIC | Facility: HOSPITAL | Age: 69
End: 2024-09-09

## 2024-09-09 NOTE — TELEPHONE ENCOUNTER
Called and spoke with pt and relayed Dr Lachman's message, he stated understanding. No further questions

## 2024-09-09 NOTE — TELEPHONE ENCOUNTER
Caller: Eduardo    Doctor: Lachman    Reason for call: Patient is calling with a couple of questions:    -When would be the soonest surgical appointment Ankle Replacement?    -How many weeks of non-weight bearing after surgery?    -For the 6 weeks that he would be a cast on, would he be able to fit jeans or dress pants over this cast?    -Is a knee scooter a good option during this recovery process?    Call back#: [phone]

## 2024-09-17 ENCOUNTER — PREP FOR PROCEDURE (OUTPATIENT)
Dept: OBGYN CLINIC | Facility: CLINIC | Age: 69
End: 2024-09-17

## 2024-09-17 ENCOUNTER — APPOINTMENT (OUTPATIENT)
Dept: LAB | Facility: AMBULARY SURGERY CENTER | Age: 69
End: 2024-09-17
Attending: ORTHOPAEDIC SURGERY
Payer: MEDICARE

## 2024-09-17 ENCOUNTER — OFFICE VISIT (OUTPATIENT)
Dept: OBGYN CLINIC | Facility: CLINIC | Age: 69
End: 2024-09-17
Payer: MEDICARE

## 2024-09-17 VITALS — BODY MASS INDEX: 40.6 KG/M2 | HEIGHT: 71 IN | WEIGHT: 290 LBS

## 2024-09-17 DIAGNOSIS — M21.6X1 ACQUIRED HINDFOOT VARUS, RIGHT: ICD-10-CM

## 2024-09-17 DIAGNOSIS — Z01.818 PREOP TESTING: ICD-10-CM

## 2024-09-17 DIAGNOSIS — M19.171 POST-TRAUMATIC ARTHRITIS OF RIGHT ANKLE: Primary | ICD-10-CM

## 2024-09-17 PROCEDURE — 99214 OFFICE O/P EST MOD 30 MIN: CPT | Performed by: ORTHOPAEDIC SURGERY

## 2024-09-17 RX ORDER — CHLORHEXIDINE GLUCONATE 40 MG/ML
SOLUTION TOPICAL DAILY PRN
OUTPATIENT
Start: 2024-09-17

## 2024-09-17 RX ORDER — CHLORHEXIDINE GLUCONATE ORAL RINSE 1.2 MG/ML
15 SOLUTION DENTAL ONCE
OUTPATIENT
Start: 2024-09-17 | End: 2024-09-17

## 2024-09-17 NOTE — PROGRESS NOTES
James R Lachman, M.D.  Attending, Orthopaedic Surgery  Foot and Ankle  [facility]      ORTHOPAEDIC FOOT AND ANKLE CLINIC VISIT     Assessment:     Encounter Diagnoses   Name Primary?    Post-traumatic arthritis of right ankle Yes    Acquired hindfoot varus, right     Preop testing           Plan:   The patient verbalized understanding of exam findings and treatment plan. We engaged in the shared decision-making process and treatment options were discussed at length with the patient. Surgical and conservative management discussed today along with risks and benefits.  Patient has end-stage varus right ankle arthritis   Patient's pain and symptoms have been refractory to non-operative treatment including topical cream, oral medication, cortisone injection therapy, [place] brace  Risks and benefits were discussed. Risks of the surgery are inclusive of but not limited to bleeding, infection, nerve injury, blood clot, worsening of symptoms, not achieving the anticipated results, persistent stiffness, weakness and the need for additional surgery. The patient verbally stated they understood those risks and would like to proceed with the surgery. Surgical consent was signed in the office today. I will see the patient the day of surgery.  He will optimize his shoes with the brace to maximize benefit.  Patient wishes to proceed with TAA with Brostrom, deltoid release, achilles lengthening, possible Malerba, possible dorsiflexion 1st Ray, possible plantar fascia release, possible PL to PB tendon transfer as necessary.   See back 3 weeks postop      History of Present Illness:   Chief Complaint:   Chief Complaint   Patient presents with    Follow-up     Follow up of the right ankle. Wants to talk about surgery.      Gavino Hall is a 68 y.o. adult who is being seen in follow-up for Right ankle arthritis. When we last saw he we recommended [place] brace which has been minimally beneficial.  Pain has  minimally improved. Residual pain is localized at anteromedial ankle with minimal radiating and described as sharp and severe. Reports he is not wearing brace all day everyday, takes it off at home when walking around the house. He would like to discuss ankle arthroplasty. He denies any significant medical history, use of blood thinners.      Pain/symptom timing:  Worse during the day when active  Pain/symptom context:  Worse with activites and work  Pain/symptom modifying factors:  Rest makes better, activities make worse  Pain/symptom associated signs/symptoms: none    Prior treatment   NSAIDsYes   Injections Yes   Bracing/Orthotics Yes    Physical Therapy No         Orthopedic Surgical History:   See below     Past Medical, Surgical and Social History:  Past Medical History:  has a past medical history of COVID-19 and GERD (gastroesophageal reflux disease).  Problem List: does not have any pertinent problems on file.  Past Surgical History:  has a past surgical history that includes Replacement total knee bilateral (2020); Vasectomy (1990); and Bone cyst excision (1966).  Family History: family history includes COPD in his father; Glaucoma in his paternal grandmother; Heart disease in his mother.  Social History:  reports that he has never smoked. He has never used smokeless tobacco. He reports current alcohol use of about 6.0 standard drinks of alcohol per week. He reports that he does not use drugs.  Current Medications: has a current medication list which includes the following prescription(s): aspirin, b complex, cetirizine, vitamin d, escitalopram, omeprazole, and turmeric.  Allergies: is allergic to cashew nut oil - food allergy, mangifera indica, nuts - food allergy, dust mite extract, grass pollen(k-o-r-t-swt colby), hydromorphone, other, pollen extract, short ragweed pollen ext, and sunflower oil - food allergy.     Review of Systems:  General- denies fever/chills  HEENT- denies hearing loss or sore  "throat  Eyes- denies eye pain or visual disturbances, denies red eyes  Respiratory- denies cough or SOB  Cardio- denies chest pain or palpitations  GI- denies abdominal pain  Endocrine- denies urinary frequency  Urinary- denies pain with urination  Musculoskeletal- Negative except noted above  Skin- denies rashes or wounds  Neurological- denies dizziness or headache  Psychiatric- denies anxiety or difficulty concentrating    Physical Exam:   Ht 5' 10.5\" (1.791 m)   Wt 132 kg (290 lb)   BMI 41.02 kg/m²   General/Constitutional: No apparent distress: well-nourished and well developed.  Eyes: normal ocular motion  Lymphatic: No appreciable lymphadenopathy  Respiratory: Non-labored breathing  Vascular: No edema, swelling or tenderness, except as noted in detailed exam.  Integumentary: No impressive skin lesions present, except as noted in detailed exam.  Neuro: No ataxia or tremors noted  Psych: Normal mood and affect, oriented to person, place and time. Appropriate affect.  Musculoskeletal: Normal, except as noted in detailed exam and in HPI.    Examination    Right    Gait Antalgic   Musculoskeletal Tender to palpation at anteromedial ankle    Skin Normal.      Nails Normal    Range of Motion  20 degrees dorsiflexion, 30 degrees plantarflexion  Subtalar motion: normal    Stability Stable    Muscle Strength 5/5 tibialis anterior  5/5 gastrocnemius-soleus  5/5 posterior tibialis  5/5 peroneal/eversion strength  5/5 EHL  5/5 FHL    Neurologic Normal    Sensation  Intact to light touch throughout sural, saphenous, superficial peroneal, deep peroneal and medial/lateral plantar nerve distributions.  [place]-Tc 5.07 filament (10g) testing  deferred.    Cardiovascular Brisk capillary refill < 2 seconds,intact DP and PT pulses    Special Tests None      Imaging Studies:   No new imaging    Scribe Attestation      I,:  Rianna Cole am acting as a scribe while in the presence of the attending physician.:       I,:  " James R Lachman, MD personally performed the services described in this documentation    as scribed in my presence.:           James R. Lachman, MD  Foot & Ankle Surgery   Department of Orthopaedic Surgery  [facility]      I personally performed the service.    James R. Lachman, MD

## 2024-09-17 NOTE — PATIENT INSTRUCTIONS
James R Lachman, M.D.  Attending, Orthopaedic Surgery  [facility]  [place] Office Phone: [phone] ? Fax: [phone]  [place] Office Phone: [phone] ? Fax:[phone]    : June Vanegas MA     Surgery Coordinators [place]: Sol Culp, [phone]  Ashlee Vogle, [phone]  Surgery Coordinator [place]:  Alexmichelle Deonna, [phone]                                                       Chiara Duvall, [phone]                                                            [email or web address]   PRE-OPERATIVE AND POST-OPERATIVE INSTRUCTIONS    General Information:  Your surgery is with Dr. Lachman.  Dates can change (although rare) depending on emergencies.  Typical post operative visits are at the following intervals:  3 weeks post surgery(except 1 week for bunions and wound monitoring), 6 weeks post surgery, 3 months post surgery, 6 months post surgery, and then on a yearly basis.  However, this may change based on Dr. Lachmans’ recommendation.  #1 post-operative rule for foot/ankle surgery:  ONCE YOU ARE OUT OF YOUR CAST AND/OR REMOVABLE BOOT, SWELLING MAY PERSIST FOR MANY MONTHS.  YOU MIGHT ALSO EXPERIENCE A BLUISH DISCOLORATION OF YOUR LEG.  THIS IS NORMAL AND PART OF THE USUAL POSTOPERATIVE EXPERIENCE.    SMOKING:  Smoking results in incomplete healing of fractures (broken bones) and joints that my have been fused.  Smoking and nicotine also prevents the growth of bone into ankle replacements and bone healing.  It also slows the healing of muscles and skin (soft tissue).  Therefore, please do not have surgery if you continue to smoke.  We reserve the right to cancel your surgery if we suspect that you are smoking.  DO NOT use nicorette gum or other patches.  Please find an alternative method to quit smoking before your surgery.    Pre-Operative Information:  Surgery date and preoperative visits:  If you have  medical problems, such as an abnormal EKG, history of BLOOD CLOT, ANEURYSM, and any other heart condition, please inform us so that we can get your medical clearance several weeks before the surgery.  Please bring any important medical information, such as an EKG, chest x-ray, or echocardiogram, with you to ensure that your surgery will not be delayed.  If needed, you will receive your preoperative appointments in the mail or by phone from our scheduling office.  The location of the preoperative appointment will be given to you also.  You may not eat after midnight the night before surgery.  If you do, your surgery will be cancelled.   You will receive a phone call from your surgery center the day before your surgery (if your surgery is on a Monday, you will get a call the Friday before).   If you do not hear from someone by 4pm the day before your surgery, please call the Surgical coordinator (number above) to notify us.  Start taking Vitamin D3 4000 units per day and Calcium 1200mg per day immediately. You will continue this until your 3 month post-op visit. These are over the counter and available at all pharmacies and supermarkets.  FOR THOSE HAVING SURGERY AT [facility]- IF YOU WILL NEED CRUTCHES OR A ROLLING WALKER AFTER SURGERY, ASK FOR A PRESCRIPTION FOR THIS FROM OUR OFFICE TODAY.  THIS CANNOT BE HANDLED THE DAY OF SURGERY AS [facility] DOES NOT STOCK THESE.    Because bacterial can often enter any defect in the skin, it is important to avoid any cuts before surgery.  Any breaks in the skin on the leg will often result in your surgery being postponed.  Please avoid going on a very long walk the day prior to surgery, or doing other activities that could lead to irritation of the skin, including yard work, extra athletic activity, or shaving.   This could result in surgery cancellation.  You MUST be fasting the day of your surgery.  Therefore, please do not consume any foot or beverage  after midnight the night before surgery.  The morning of surgery you may take your usual medications with a sip of water.  It is important not to take anti-inflammatory medication like Ibuprofen, Motrin, Naproxen (Aleve), or Aspirin 7-10 days before surgery because they will make you bleed more than usual.  Vitamin, E, Plavix and Coumadin also have the same effect.  Stop Aspirin and Vitamin E two weeks before surgery.  YOUR MEDICAL DOCTOR SHOULD TELL YOU WHEN TO STOP COUMADIN OR PLAVIX.  If your surgery involves any bone healing, please do not take anti-inflammatories for at least 6 weeks after surgery.  This can impede bone healing (ibuprofen, Aleve, Relafen, iodine).  Tylenol is fine to take.    PREOPERATIVE BATHING INSTRUCTIONS:    Before your surgery, bathe with Hibiclens (4% Chlorhexidene) as instructed below.  This skin cleanser will help reduce the bacteria on your skin before surgery.  To avoid irritating your eyes, do not apply Hibiclens above the level of your neck.  On the evening before AND the morning of surgery, bathe your entire body except the face and scalp, then rinse freely.  DO NOT apply to your face or scalp, as Hibiclens can irritate your eyes.  Purchasing information:   Hibiclens is available without a prescription at most retail pharmacies.     ADDITIONAL INSTRUCTIONS:  PATIENTS HAVING FOOT/ANKLE SURGERY     In preparation for your upcoming surgery, we kindly request and advise the following:  Notify our office if you are taking any of the following:  Coumadin (warfarin):  Persantine (dipyridamole); Pletal (cilostazol); Plavix (clopidogrel); Ticlid (ticlopidine); Agrylin (anagrelide); Aggrenox (dipyridamole and aspirin) or other blood thinners,.  In addition, stop taking Vitamin E and herbal supplements.  Do not schedule any elective dental work for at least 6 months after surgery.  If you had an ankle replacement, you will need to take antibiotics before any future dental procedures. Your  dentist or our office can prescribe these for you.  1000mg of Amoxicillin 1 hour prior to any dental procedure is the recommended dosing.      THREE RULES:    After surgery you will most likely be given the instructions “KEEP YOUR TOES ABOVE YOUR NOSE.”  This means that you MUST have your feet elevated higher than your heart.  Keeping your toes above your nose helps to heal the muscles and skin (soft tissues) by reducing swelling in your leg.  This position also helps to prevent infection, and is very important in avoiding deep venous thrombosis (blood clots).    In order to keep the blood circulating in your legs and in order to avoid deep vein   thrombosis (blood clots), we ask patients to GET UP ONCE AN HOUR during the day.  This means you should at least cross the room and come back.  It does not mean you have to be up for long periods of time.  In most cases we will not have people immediately put any weight on their operated part.  This is important to prevent loosening of metal or other devices holding the bones together.  It also prevents irritation of the soft tissues which can lead to prolonged healing.  When we say get up once an hour, please walk, hop or move with an assisted device.  This is important!    Do not do any excessive walking during the first few days after surgery.  Recovering from surgery is a full-time task for the patient.  Postoperative care is important to avoid irritating the skin incision, which can lead to infection.  Please do not plan activities or go out of town for several weeks after surgery.  If you are unsure about your future activities, please schedule surgery only when you know it is acceptable for you.  Scheduling surgery and then canceling the date, prevents other people from having surgery on that date as it takes time to line everything up effectively.  If you cancel your surgery the week of your planned surgery, we reserve the right to cancel all future surgical  procedures.    THE DAY OF SURGERY:    Arrival to the hospital or outpatient surgical center on time is imperative.  If you arrive late, then your surgery will be cancelled.  You MUST have a family member/friend bring you, stay with you throughout the DURATION of your surgery, and drive you home.  You MUST be fasting the day of your surgery.  Therefore, do not consume any food or beverage after midnight the night before surgery.  At your pre-operative visit with the anesthesia staff, or during your phone screen, a nurse will instruct you what medications you will need to take the day of surgery.  MAKE SURE THAT THE PHARMACY LISTED IN THE ELECTRONIC MEDICAL RECORD (EPIC) IS YOUR PREFERRED PHARMACY. For example, if you are staying with family or a friend, and will not be near your preferred pharmacy, YOU MUST, tell the nurses checking you in the day of surgery so that this can be changed in the system.  If your prescriptions are sent to a pharmacy, this cannot be changed.      AFTER YOUR SURGERY:  Bleeding through the bandage almost always occurs.  Do not let this alarm you.  Simply add more gauze or a towel, call us, and come in for a dressing change.   If you think it is excessive, contact us immediately or go to the local emergency room.    Do not get the bandage wet.  Showering is possible with plastic protectors.   Be very careful, as the bathroom can be wet and slippery.  If you do get your dressing wet, it should be changed immediately.  Please contact us.      ONCE YOUR ARE OUT OF YOUR CAST AND/OR REMOVABLE BOOT, SWELLING MAY PERSIST FOR MANY MONTHS.  YOU MIGHT ALSO EXPERIENCE A BLUISH DISCOLORATION OF YOUR LEG.  THIS IS NORMAL AND PART OF THE USUAL POSTOPERATIVE EXPERIENCE.  WEARING COMPRESSION HOSE (ELASTIC STOCKINGS) CAN HELP AVOID SOME OF THIS SWELLING.      DRESSING:   The purpose of the surgical dressing is to keep your wound and the surgical site protected from the environment.  Most dressings contain  splints, which help to hold your foot and ankle in a corrected position, and also allow the surgical site to heal properly. Dressings will remain in place and undisturbed until the first postop visit.   If you have a drain in place, this will need to be removed in 1-3 days after surgery.  The time for the drain to be pulled will be written on your discharge instruction sheet.    CAST  INSTRUCTIONS:  You may or may not get a cast following surgery.  If you do, pay close attention to the following:    After application of a splint or cast, it is very important to elevate your leg for 24 to 72 hours.   The injured area should be elevated well above the heart.   Remember “Toes above your Nose”.  Rest and elevation greatly reduce pain and speed the healing process by minimizing early swelling.    CALL YOUR DOCTORS OFFICE OR VISIT LOCATION EMERGENCY ROOM IF YOU HAVE ANY OF THE FOLLOWING:    Significant increased pain, which may be caused by swelling, and the feeling that the splint or cast is too tight  Numbness and tingling in your hand or foot, which may be caused by too much pressure on the nerves  Burning and stinging, which may be caused by too much pressure on the skin  Excessive swelling below the cast, which may mean the cast is slowing your blood circulation  Loss of active movement of toes, which request an urgent evaluation  Loss of “capillary refill”.  Pinch the tip of toes and brijesh the skin.  Release pressure and if the skin does not return pink then call the office immediately.      DO NOT GET YOUR CAST WET.   Bacteria thrive in moist dark areas.  We do not want this.   If your cast becomes wet, return to the office and we will apply another one.    PAIN AFTER SURGERY:  Narcotic pain medication can and will depress your respiratory system if taken in excess.  The goal of pain management with narcotics is to be comfortable not pain free.  If you take enough narcotics to be pain free then you run the risk of  stopping breathing.  If this happens, call 911 immediately!  Pain in the heel is often  caused by pressure from the weight of your foot on the bed.  Make sure your heel is suspended off the bed by keeping a pillow underneath your calf not your knee.    Medications:  You will be given narcotic pain medication. Do NOT drive while taking narcotic medications. Medications such as Darvocet, Percocet, Vicoden or Tylenol #3, also contain acetaminophen (Tylenol). Do not take acetaminophen or Tylenol from home when taking theses medications. When you fill your prescription, you may ask the pharmacist if your pain medication has acetaminophen/Tylenol in it. It is okay to take Tylenol with Oxycontin/Oxycodone. Should you have pain after taking your prescription medication, ibuprophen (Motrin, Advil, and Alleve) is a common over the counter preparation and may often be taken with the prescription pain medication as long as you take them with food. These medications can irritate the stomach lining.   Unless you are allergic to aspirin or currently taking a blood thinner, Dr. Lachmans’ patients are requested to take one 325 mg aspirin every 12 hours until you are back to walking normally after surgery (This can be up to 6 weeks).  Narcotic medications commonly cause nausea. Taking them with food will decrease this side effect. If you are having extreme nausea, please contact us for an alternative medication or for something that can be taken with this medication to decrease the nausea.   Also, narcotic medications frequently cause constipation. An increase of fiber, fruits and vegetables in your diet may alleviate this problem, or if necessary, you may use an over-the-counter medication such as senekot, colace, or Fibercon for constipation problems.   You should resume all medications you were taking prior to the surgery unless otherwise specified.     Activity:   Because of your recent foot surgery, your activity level will  decrease. You will need to elevate your foot ABOVE the level of your heart for a minimum of four days. The length of time necessary for the swelling to go down, and for your wounds to heal properly depends greatly on your efforts here. Elevation is extremely important to avoid compromising the blood supply to your foot. Remember when your foot is down it will swell, which will increase pain and slow healing. Wiggle your toes frequently if possible.   If you go home with a regional block, (a type of anesthesia) the foot and leg will be numb. Think of ways to get into your house and around the house until the block wears off.   Keep in mind that it may be a legal issue if you drive while in a cast or splint, especially when the splint is on the right foot. You may call the Department of [employer] Veh[employer] to schedule a road test if you have adaptive equipment applied to your car.   The amount of weight you are allowed to bear on your foot will be written on your discharge sheet filled out at the time of surgery. The following is an explanation of the possibilities:       COVID-19 Policies  [facility] has the following policies when it comes to ELECTIVE surgery  No elective surgery requiring anesthesia until 7 weeks after a patient tested positive for COVID-19   No elective surgery requiring anesthesia until 3 months after a patient was hospitalized for COVID-19      Non-weight bearing:   You are to put NO weight whatsoever on your foot. When using crutches or a walker, your foot should not touch the ground, except when you are standing. Then, it may rest on the ground. If you are to be non-weight bearing, and you are not compliant, you could compromise the surgery.     Some of our patients have been requesting prescriptions for a roll-a-bout knee scooter. [employer] ([place]) and other insurances have been denying these claims, and you may either have to rent one or pay out of pocket to purchase one.  THIS SHOULD BE  PURCHASED PRIOR TO THE SURGERY AND YOU SHOULD BRING IT WITH YOU THE DAY OF THE SURGERY TO AIDE IN GETTING FROM THE CAR INTO THE HOUSE AFTER SURGERY.

## 2024-09-18 ENCOUNTER — TELEPHONE (OUTPATIENT)
Dept: OBGYN CLINIC | Facility: CLINIC | Age: 69
End: 2024-09-18

## 2024-09-18 LAB
ATRIAL RATE: 63 BPM
P AXIS: 1 DEGREES
PR INTERVAL: 208 MS
QRS AXIS: 35 DEGREES
QRSD INTERVAL: 84 MS
QT INTERVAL: 422 MS
QTC INTERVAL: 431 MS
T WAVE AXIS: 35 DEGREES
VENTRICULAR RATE: 63 BPM

## 2024-09-18 PROCEDURE — 93010 ELECTROCARDIOGRAM REPORT: CPT | Performed by: INTERNAL MEDICINE

## 2024-10-14 ENCOUNTER — ANESTHESIA EVENT (OUTPATIENT)
Dept: PERIOP | Facility: HOSPITAL | Age: 69
End: 2024-10-14
Payer: MEDICARE

## 2024-10-14 DIAGNOSIS — Z01.818 PREOP TESTING: Primary | ICD-10-CM

## 2024-10-17 ENCOUNTER — TELEPHONE (OUTPATIENT)
Dept: OBGYN CLINIC | Facility: CLINIC | Age: 69
End: 2024-10-17

## 2024-10-17 ENCOUNTER — APPOINTMENT (OUTPATIENT)
Age: 69
End: 2024-10-17
Payer: MEDICARE

## 2024-10-17 NOTE — PRE-PROCEDURE INSTRUCTIONS
Pre-Surgery Instructions:   Medication Instructions    aspirin (Aspirin 81) 81 mg EC tablet Hold day of surgery.    B Complex CAPS Stop taking 7 days prior to surgery.    Calcium Carbonate-Vit D-Min (CALCIUM 1200 PO) Hold day of surgery.    cetirizine (ZyrTEC Allergy) 10 mg tablet Take day of surgery.    Cholecalciferol (Vitamin D) 50 MCG (2000 UT) CAPS Hold day of surgery.    escitalopram (LEXAPRO) 20 mg tablet Take day of surgery.    omeprazole (PriLOSEC) 20 mg delayed release capsule Pt takes every other day.  Will skip Monday 10/28 dose.    Medication instructions for day surgery reviewed. Please use only a sip of water to take your instructed medications. Avoid all over the counter vitamins, supplements and NSAIDS for one week prior to surgery per anesthesia guidelines. Tylenol is ok to take as needed.     You will receive a call one business day prior to surgery with an arrival time and hospital directions. If your surgery is scheduled on a Monday, the hospital will be calling you on the Friday prior to your surgery. If you have not heard from anyone by 8pm, please call the hospital supervisor through the hospital  at [phone]. ([place] [phone] or [place] [phone]).    Do not eat or drink anything after midnight the night before your surgery, including candy, mints, lifesavers, or chewing gum. Do not drink alcohol 24hrs before your surgery. Try not to smoke at least 24hrs before your surgery.       Follow the pre surgery showering instructions as listed in the “My Surgical Experience Booklet” or otherwise provided by your surgeon's office. Do not use a blade to shave the surgical area 1 week before surgery. It is okay to use a clean electric clippers up to 24 hours before surgery. Do not apply any lotions, creams, including makeup, cologne, deodorant, or perfumes after showering on the day of your surgery. Do not use dry shampoo, hair spray, hair gel, or any type of hair products.     No  contact lenses, eye make-up, or artificial eyelashes. Remove nail polish, including gel polish, and any artificial, gel, or acrylic nails if possible. Remove all jewelry including rings and body piercing jewelry.     Wear causal clothing that is easy to take on and off. Consider your type of surgery.    Keep any valuables, jewelry, piercings at home. Please bring any specially ordered equipment (sling, braces) if indicated.    Arrange for a responsible person to drive you to and from the hospital on the day of your surgery. Please confirm the visitor policy for the day of your procedure when you receive your phone call with an arrival time.     Call the surgeon's office with any new illnesses, exposures, or additional questions prior to surgery.    Please reference your “My Surgical Experience Booklet” for additional information to prepare for your upcoming surgery.

## 2024-10-17 NOTE — TELEPHONE ENCOUNTER
Called and LVM on patient's cell to let him know that we do not process the parking placard applications that he sent us. We can only process the forms we have in the office through [facility].   I let him know that we will send another blank form of the ones we have in the office to him. He would just need to fill out his portion and then send it back to the office or drop it off so we can send it to the person who processes them for [facility].

## 2024-10-18 ENCOUNTER — APPOINTMENT (OUTPATIENT)
Age: 69
End: 2024-10-18
Payer: MEDICARE

## 2024-10-18 ENCOUNTER — RA CDI HCC (OUTPATIENT)
Dept: OTHER | Facility: HOSPITAL | Age: 69
End: 2024-10-18

## 2024-10-18 DIAGNOSIS — E55.9 VITAMIN D DEFICIENCY: ICD-10-CM

## 2024-10-18 DIAGNOSIS — Z12.5 SCREENING PSA (PROSTATE SPECIFIC ANTIGEN): ICD-10-CM

## 2024-10-18 DIAGNOSIS — R73.01 IFG (IMPAIRED FASTING GLUCOSE): Chronic | ICD-10-CM

## 2024-10-18 DIAGNOSIS — E78.00 PURE HYPERCHOLESTEROLEMIA: ICD-10-CM

## 2024-10-18 DIAGNOSIS — Z01.818 PREOP TESTING: ICD-10-CM

## 2024-10-18 LAB
25(OH)D3 SERPL-MCNC: 44.8 NG/ML (ref 30–100)
ALBUMIN SERPL BCG-MCNC: 3.9 G/DL (ref 3.5–5)
ALP SERPL-CCNC: 65 U/L (ref 34–104)
ALT SERPL W P-5'-P-CCNC: 16 U/L (ref 7–52)
ANION GAP SERPL CALCULATED.3IONS-SCNC: 10 MMOL/L (ref 4–13)
AST SERPL W P-5'-P-CCNC: 17 U/L (ref 5–45)
BILIRUB SERPL-MCNC: 0.4 MG/DL (ref 0.2–1)
BUN SERPL-MCNC: 21 MG/DL (ref 5–25)
CALCIUM SERPL-MCNC: 8.9 MG/DL (ref 8.4–10.2)
CHLORIDE SERPL-SCNC: 105 MMOL/L (ref 96–108)
CHOLEST SERPL-MCNC: 177 MG/DL
CO2 SERPL-SCNC: 26 MMOL/L (ref 21–32)
CREAT SERPL-MCNC: 0.88 MG/DL (ref 0.6–1.3)
ERYTHROCYTE [DISTWIDTH] IN BLOOD BY AUTOMATED COUNT: 13.8 % (ref 11.6–15.1)
EST. AVERAGE GLUCOSE BLD GHB EST-MCNC: 117 MG/DL
GLUCOSE P FAST SERPL-MCNC: 95 MG/DL (ref 65–99)
HBA1C MFR BLD: 5.7 %
HCT VFR BLD AUTO: 46.6 % (ref 36.5–46.1)
HDLC SERPL-MCNC: 61 MG/DL
HGB BLD-MCNC: 15.3 G/DL (ref 12–15.4)
LDLC SERPL CALC-MCNC: 103 MG/DL (ref 0–100)
MCH RBC QN AUTO: 29.4 PG (ref 26.8–34.3)
MCHC RBC AUTO-ENTMCNC: 32.8 G/DL (ref 31.4–37.4)
MCV RBC AUTO: 90 FL (ref 82–98)
NONHDLC SERPL-MCNC: 116 MG/DL
PLATELET # BLD AUTO: 224 THOUSANDS/UL (ref 149–390)
PMV BLD AUTO: 11.1 FL (ref 8.9–12.7)
POTASSIUM SERPL-SCNC: 4.5 MMOL/L (ref 3.5–5.3)
PROT SERPL-MCNC: 6.8 G/DL (ref 6.4–8.4)
PSA SERPL-MCNC: 0.62 NG/ML (ref 0–4)
RBC # BLD AUTO: 5.2 MILLION/UL (ref 3.88–5.12)
SODIUM SERPL-SCNC: 141 MMOL/L (ref 135–147)
TRIGL SERPL-MCNC: 63 MG/DL
WBC # BLD AUTO: 5.31 THOUSAND/UL (ref 4.31–10.16)

## 2024-10-18 PROCEDURE — 85027 COMPLETE CBC AUTOMATED: CPT

## 2024-10-18 PROCEDURE — G0103 PSA SCREENING: HCPCS

## 2024-10-18 PROCEDURE — 82306 VITAMIN D 25 HYDROXY: CPT

## 2024-10-18 PROCEDURE — 36415 COLL VENOUS BLD VENIPUNCTURE: CPT

## 2024-10-18 PROCEDURE — 80053 COMPREHEN METABOLIC PANEL: CPT

## 2024-10-18 PROCEDURE — 83036 HEMOGLOBIN GLYCOSYLATED A1C: CPT

## 2024-10-18 PROCEDURE — 80061 LIPID PANEL: CPT

## 2024-10-25 ENCOUNTER — OFFICE VISIT (OUTPATIENT)
Dept: FAMILY MEDICINE CLINIC | Facility: CLINIC | Age: 69
End: 2024-10-25
Payer: MEDICARE

## 2024-10-25 VITALS
RESPIRATION RATE: 18 BRPM | TEMPERATURE: 96.6 F | HEIGHT: 71 IN | HEART RATE: 68 BPM | DIASTOLIC BLOOD PRESSURE: 68 MMHG | BODY MASS INDEX: 42.06 KG/M2 | WEIGHT: 300.4 LBS | OXYGEN SATURATION: 97 % | SYSTOLIC BLOOD PRESSURE: 104 MMHG

## 2024-10-25 DIAGNOSIS — F33.1 MDD (MAJOR DEPRESSIVE DISORDER), RECURRENT EPISODE, MODERATE (HCC): ICD-10-CM

## 2024-10-25 DIAGNOSIS — Z23 ENCOUNTER FOR IMMUNIZATION: ICD-10-CM

## 2024-10-25 DIAGNOSIS — E55.9 VITAMIN D DEFICIENCY: ICD-10-CM

## 2024-10-25 DIAGNOSIS — R73.01 IFG (IMPAIRED FASTING GLUCOSE): Chronic | ICD-10-CM

## 2024-10-25 DIAGNOSIS — Z00.00 ENCOUNTER FOR MEDICARE ANNUAL WELLNESS EXAM: Primary | ICD-10-CM

## 2024-10-25 DIAGNOSIS — E78.00 PURE HYPERCHOLESTEROLEMIA: ICD-10-CM

## 2024-10-25 PROCEDURE — 90662 IIV NO PRSV INCREASED AG IM: CPT | Performed by: FAMILY MEDICINE

## 2024-10-25 PROCEDURE — G0008 ADMIN INFLUENZA VIRUS VAC: HCPCS | Performed by: FAMILY MEDICINE

## 2024-10-25 PROCEDURE — 99214 OFFICE O/P EST MOD 30 MIN: CPT | Performed by: FAMILY MEDICINE

## 2024-10-25 PROCEDURE — G0439 PPPS, SUBSEQ VISIT: HCPCS | Performed by: FAMILY MEDICINE

## 2024-10-25 NOTE — ASSESSMENT & PLAN NOTE
LDL up a bit at 103.  Patient admits he has not been compliant with his diet.  ASCVD risk greater than 10%.  He wishes to work on diet before considering medications.  Agrees to recheck in about 6 months.  Orders:    Comprehensive metabolic panel; Future    Lipid panel; Future    Hemoglobin A1C; Future    Vitamin D 25 hydroxy; Future

## 2024-10-25 NOTE — ASSESSMENT & PLAN NOTE
Well-controlled at 42.  He is currently taking a higher dose of vitamin D due to his upcoming surgery.  Will watch blood work.  Orders:    Comprehensive metabolic panel; Future    Lipid panel; Future    Hemoglobin A1C; Future    Vitamin D 25 hydroxy; Future

## 2024-10-25 NOTE — H&P (VIEW-ONLY)
Assessment & Plan  Encounter for Medicare annual wellness exam  See below       IFG (impaired fasting glucose)  Stable with hemoglobin A1c of 5.7  Reassured patient.  Continue to work on lowering carbohydrates in his diet.  Orders:    Comprehensive metabolic panel; Future    Lipid panel; Future    Hemoglobin A1C; Future    Vitamin D 25 hydroxy; Future    Pure hypercholesterolemia  LDL up a bit at 103.  Patient admits he has not been compliant with his diet.  ASCVD risk greater than 10%.  He wishes to work on diet before considering medications.  Agrees to recheck in about 6 months.  Orders:    Comprehensive metabolic panel; Future    Lipid panel; Future    Hemoglobin A1C; Future    Vitamin D 25 hydroxy; Future    MDD (major depressive disorder), recurrent episode, moderate (HCC)  Doing well on Lexapro 20 mg daily.         Vitamin D deficiency  Well-controlled at 44.  He is currently taking a higher dose of vitamin D due to his upcoming surgery.  Will watch blood work.  Orders:    Comprehensive metabolic panel; Future    Lipid panel; Future    Hemoglobin A1C; Future    Vitamin D 25 hydroxy; Future    Encounter for immunization    Orders:    influenza vaccine, high-dose, PF 0.5 mL (Fluzone High Dose)         No follow-ups on file.    Subjective:   Gavino is a 69 y.o. adult here today for a follow-up on his current medical conditions:    Patient Active Problem List   Diagnosis    Allergic rhinitis    Attention deficit disorder (ADD) without hyperactivity    Carpal tunnel syndrome    MDD (major depressive disorder), recurrent episode, moderate (HCC)    Diverticulosis    Gastro-esophageal reflux disease without esophagitis    History of knee replacement    IFG (impaired fasting glucose)    Class 3 severe obesity due to excess calories with serious comorbidity and body mass index (BMI) of 40.0 to 44.9 in adult (HCC)    Osteoarthritis of knee    Pure hypercholesterolemia    Polyp of colon    GILLIAN on CPAP    Vitamin D  deficiency    Post-traumatic arthritis of right ankle    Hindfoot varus, acquired, right         Patient Care Team:  Sera Richter DO as PCP - General (Family Medicine)  Gina Roberts MD (Gastroenterology)  Bryce Rojas DO (Gastroenterology)    Current Medications:  Current Outpatient Medications   Medication Sig Dispense Refill    aspirin (Aspirin 81) 81 mg EC tablet Take 81 mg by mouth daily      B Complex CAPS Take by mouth      Calcium Carbonate-Vit D-Min (CALCIUM 1200 PO) Take by mouth      cetirizine (ZyrTEC Allergy) 10 mg tablet Take 10 mg by mouth daily      Cholecalciferol (Vitamin D) 50 MCG (2000 UT) CAPS Take 4,000 Units by mouth daily      escitalopram (LEXAPRO) 20 mg tablet Take 1 tablet (20 mg total) by mouth daily 90 tablet 2    omeprazole (PriLOSEC) 20 mg delayed release capsule Take 20 mg by mouth every other day       No current facility-administered medications for this visit.       HPI:  Chief Complaint   Patient presents with    Medicare Wellness Visit     No new problems or concerns at this time.      -- Above per clinical staff and reviewed. --    PHQ-2/9 Depression Screening    Little interest or pleasure in doing things: 1 - several days  Feeling down, depressed, or hopeless: 0 - not at all  Trouble falling or staying asleep, or sleeping too much: 0 - not at all  Feeling tired or having little energy: 1 - several days  Poor appetite or overeatin - several days  Feeling bad about yourself - or that you are a failure or have let yourself or your family down: 1 - several days  Trouble concentrating on things, such as reading the newspaper or watching television: 0 - not at all  Moving or speaking so slowly that other people could have noticed. Or the opposite - being so fidgety or restless that you have been moving around a lot more than usual: 0 - not at all  Thoughts that you would be better off dead, or of hurting yourself in some way: 0 - not at all  PHQ-9 Score: 4  PHQ-9  "Interpretation: No or Minimal depression            Oct 2024 labs chol HDL 61, , PSA 0.624, vit D 44, CMP normal , HbA1C 5.7   MWV     Recurrent sinusisits   G[facility] sleep medicine main line pulmatilda Manjarrez     Today:  Having ankle replaced on Monday   Diet okay, not especially healthy  Working with dietician   Trainer working with twice a week - [employer] machine   July had COVID   4000 iu for 2 weeks or so, 1200 mg calcium    2000 before that   The following portions of the patient's history were reviewed and updated as appropriate: allergies, current medications, past family history, past medical history, past social history, past surgical history and problem list.    Objective:  Vitals:  /68   Pulse 68   Temp (!) 96.6 °F (35.9 °C)   Resp 18   Ht 5' 10.5\" (1.791 m)   Wt (!) 136 kg (300 lb 6.4 oz)   SpO2 97%   BMI 42.49 kg/m²    Wt Readings from Last 3 Encounters:   10/25/24 (!) 136 kg (300 lb 6.4 oz)   09/17/24 132 kg (290 lb)   08/28/24 132 kg (290 lb)      BP Readings from Last 3 Encounters:   10/25/24 104/68   08/28/24 115/77   05/13/24 118/79        Review of Systems   He has no other concerns. No unexpected weight changes. No chest pain, SOB, or palpitations. No GERD. No changes in bowels or bladder. Sleeping well. No mood changes.     Physical Exam   Constitutional:  he appears well-developed and well-nourished.  HENT: Head: Normocephalic.   Neck: Neck supple.   Cardiovascular: Normal rate, regular rhythm and normal heart sounds.   Pulmonary/Chest: Effort normal and breath sounds normal. No wheezes, rales, or rhonchi.   Abdominal: Soft. Bowel sounds are normal. There is no tenderness. No hepatosplenomegaly.   Musculoskeletal: he exhibits no edema.   Lymphadenopathy: he has no cervical adenopathy.   Neurological: he is alert and oriented to person, place, and time.   Skin: Skin is warm and dry.   Psychiatric: he has a normal mood and affect. his behavior is normal. Thought content normal. "

## 2024-10-25 NOTE — PROGRESS NOTES
Ambulatory Visit  Name: Gavino Hall      : 1955      MRN: [id number]  Encounter Provider: Sera Richter DO  Encounter Date: 10/25/2024   Encounter department: [facility]      No orders of the defined types were placed in this encounter.       Preventive health issues were discussed with patient, and age appropriate screening tests were ordered as noted in patient's After Visit Summary. Personalized health advice and appropriate referrals for health education or preventive services given if needed, as noted in patient's After Visit Summary.    History of Present Illness         Patient Care Team:  Sera Richter DO as PCP - General (Family Medicine)  Gina Roberts MD (Gastroenterology)  Bryce Rojas DO (Gastroenterology)    Review of Systems  Annual Wellness Visit Questionnaire   Gavino is here for his Subsequent Wellness visit.     Health Risk Assessment:   Patient rates overall health as good. Patient feels that their physical health rating is slightly worse. Patient is satisfied with their life. Eyesight was rated as slightly worse. Hearing was rated as slightly worse. Patient feels that their emotional and mental health rating is slightly better. Patients states they are never, rarely angry. Patient states they are often unusually tired/fatigued. Pain experienced in the last 7 days has been some. Patient's pain rating has been 4/10. Patient states that he has experienced weight loss or gain in last 6 months.     Depression Screening:   PHQ-9 Score: 4      Fall Risk Screening:   In the past year, patient has experienced: no history of falling in past year      Urinary Incontinence Screening:   Patient has not leaked urine accidently in the last six months.     Home Safety:  Patient has trouble with stairs inside or outside of their home. Patient has working smoke alarms and has working carbon monoxide detector. Home safety hazards include: loose rugs on the  floor.     Nutrition:   Current diet is Regular.     Medications:   Patient is currently taking over-the-counter supplements. OTC medications include: see medication list. Patient is able to manage medications.     Activities of Daily Living (ADLs)/Instrumental Activities of Daily Living (IADLs):   Walk and transfer into and out of bed and chair?: Yes  Dress and groom yourself?: Yes    Bathe or shower yourself?: Yes    Feed yourself? Yes  Do your laundry/housekeeping?: Yes  Manage your money, pay your bills and track your expenses?: Yes  Make your own meals?: Yes    Do your own shopping?: Yes    Previous Hospitalizations:   Any hospitalizations or ED visits within the last 12 months?: No      Advance Care Planning:   Living will: Yes    Durable POA for healthcare: Yes    Advanced directive: Yes    Advanced directive counseling given: Yes    End of Life Decisions reviewed with patient: Yes      Comments: Patient forgot to bring in a copy of his advance directive.  Will bring it with him to the next visit or send to us via [employer].    Cognitive Screening:   Provider or family/friend/caregiver concerned regarding cognition?: No    PREVENTIVE SCREENINGS      Cardiovascular Screening:    General: Screening Not Indicated and History Lipid Disorder      Diabetes Screening:     General: Screening Current      Colorectal Cancer Screening:     General: Screening Current      Prostate Cancer Screening:    General: Screening Current      Abdominal Aortic Aneurysm (AAA) Screening:    Risk factors include: age between 65-76 yo        Lung Cancer Screening:     General: Screening Not Indicated      Hepatitis C Screening:    General: Screening Current    Screening, Brief Intervention, and Referral to Treatment (SBIRT)    Screening  Typical number of drinks in a day: 1  Typical number of drinks in a week: 7  Interpretation: Low risk drinking behavior.    AUDIT-C Screenin) How often did you have a drink containing alcohol in the  past year? 4 or more times a week  2) How many drinks did you have on a typical day when you were drinking in the past year? 1 to 2  3) How often did you have 6 or more drinks on one occasion in the past year? less than monthly    AUDIT-C Score: 5  Interpretation: Score 4-12 (male): POSITIVE screen for alcohol misuse    AUDIT Screenin) How often during the last year have you found that you were not able to stop drinking once you had started? 1 - less than monthly  5) How often during the last year have you failed to do what was normally expected from you because of drinking? 0 - never  6) How often during the last year have you needed a first drink in the morning to get yourself going after a heavy drinking session? 0 - never  7) How often during the last year have you had a feeling of guilt or remorse after drinking? 0 - never  8) How often during the last year have you been unable to remember what happened the night before because you had been drinking? 0 - never  9) Have you or someone else been injured as a result of your drinking? 0 - no  10) Has a relative or friend or a doctor or another health worker been concerned about your drinking or suggested you cut down? 4 - yes, during the last year    AUDIT Score: 10  Interpretation: Harmful or hazardous alcohol consumption    Single Item Drug Screening:  How often have you used an illegal drug (including marijuana) or a prescription medication for non-medical reasons in the past year? never    Single Item Drug Screen Score: 0  Interpretation: Negative screen for possible drug use disorder    Social Determinants of Health     Financial Resource Strain: Low Risk  (10/23/2023)    Received from [employer] [employer] [facility]    Overall Financial Resource Strain ([facility])     Difficulty of Paying Living Expenses: Not hard at all   Food Insecurity: No Food Insecurity (10/18/2024)    Nursing - Inadequate Food Risk Classification     Worried About Running Out of  Food in the Last Year: Never true     Ran Out of Food in the Last Year: Never true   Transportation Needs: No Transportation Needs (10/18/2024)    PRAPARE - Transportation     Lack of Transportation (Medical): No     Lack of Transportation (Non-Medical): No   Housing Stability: Low Risk  (10/18/2024)    Housing Stability Vital Sign     Unable to Pay for Housing in the Last Year: No     Number of Times Moved in the Last Year: 0     Homeless in the Last Year: No   Utilities: Not At Risk (10/18/2024)    [facility] Utilities     Threatened with loss of utilities: No     No results found.    Objective     There were no vitals taken for this visit.      Physical Exam

## 2024-10-25 NOTE — PROGRESS NOTES
Assessment & Plan  Encounter for Medicare annual wellness exam  See below       IFG (impaired fasting glucose)  Stable with hemoglobin A1c of 5.7  Reassured patient.  Continue to work on lowering carbohydrates in his diet.  Orders:    Comprehensive metabolic panel; Future    Lipid panel; Future    Hemoglobin A1C; Future    Vitamin D 25 hydroxy; Future    Pure hypercholesterolemia  LDL up a bit at 103.  Patient admits he has not been compliant with his diet.  ASCVD risk greater than 10%.  He wishes to work on diet before considering medications.  Agrees to recheck in about 6 months.  Orders:    Comprehensive metabolic panel; Future    Lipid panel; Future    Hemoglobin A1C; Future    Vitamin D 25 hydroxy; Future    MDD (major depressive disorder), recurrent episode, moderate (HCC)  Doing well on Lexapro 20 mg daily.         Vitamin D deficiency  Well-controlled at 44.  He is currently taking a higher dose of vitamin D due to his upcoming surgery.  Will watch blood work.  Orders:    Comprehensive metabolic panel; Future    Lipid panel; Future    Hemoglobin A1C; Future    Vitamin D 25 hydroxy; Future    Encounter for immunization    Orders:    influenza vaccine, high-dose, PF 0.5 mL (Fluzone High Dose)         No follow-ups on file.    Subjective:   Gavino is a 69 y.o. adult here today for a follow-up on his current medical conditions:    Patient Active Problem List   Diagnosis    Allergic rhinitis    Attention deficit disorder (ADD) without hyperactivity    Carpal tunnel syndrome    MDD (major depressive disorder), recurrent episode, moderate (HCC)    Diverticulosis    Gastro-esophageal reflux disease without esophagitis    History of knee replacement    IFG (impaired fasting glucose)    Class 3 severe obesity due to excess calories with serious comorbidity and body mass index (BMI) of 40.0 to 44.9 in adult (HCC)    Osteoarthritis of knee    Pure hypercholesterolemia    Polyp of colon    GILLIAN on CPAP    Vitamin D  deficiency    Post-traumatic arthritis of right ankle    Hindfoot varus, acquired, right         Patient Care Team:  Sera Richter DO as PCP - General (Family Medicine)  Gina Roberts MD (Gastroenterology)  Bryce Rojas DO (Gastroenterology)    Current Medications:  Current Outpatient Medications   Medication Sig Dispense Refill    aspirin (Aspirin 81) 81 mg EC tablet Take 81 mg by mouth daily      B Complex CAPS Take by mouth      Calcium Carbonate-Vit D-Min (CALCIUM 1200 PO) Take by mouth      cetirizine (ZyrTEC Allergy) 10 mg tablet Take 10 mg by mouth daily      Cholecalciferol (Vitamin D) 50 MCG (2000 UT) CAPS Take 4,000 Units by mouth daily      escitalopram (LEXAPRO) 20 mg tablet Take 1 tablet (20 mg total) by mouth daily 90 tablet 2    omeprazole (PriLOSEC) 20 mg delayed release capsule Take 20 mg by mouth every other day       No current facility-administered medications for this visit.       HPI:  Chief Complaint   Patient presents with    Medicare Wellness Visit     No new problems or concerns at this time.      -- Above per clinical staff and reviewed. --    PHQ-2/9 Depression Screening    Little interest or pleasure in doing things: 1 - several days  Feeling down, depressed, or hopeless: 0 - not at all  Trouble falling or staying asleep, or sleeping too much: 0 - not at all  Feeling tired or having little energy: 1 - several days  Poor appetite or overeatin - several days  Feeling bad about yourself - or that you are a failure or have let yourself or your family down: 1 - several days  Trouble concentrating on things, such as reading the newspaper or watching television: 0 - not at all  Moving or speaking so slowly that other people could have noticed. Or the opposite - being so fidgety or restless that you have been moving around a lot more than usual: 0 - not at all  Thoughts that you would be better off dead, or of hurting yourself in some way: 0 - not at all  PHQ-9 Score: 4  PHQ-9  "Interpretation: No or Minimal depression            Oct 2024 labs chol HDL 61, , PSA 0.624, vit D 44, CMP normal , HbA1C 5.7   MWV     Recurrent sinusisits   G[facility]IAN sleep medicine main line pulmatilda Manjarrez     Today:  Having ankle replaced on Monday   Diet okay, not especially healthy  Working with dietician   Trainer working with twice a week - [employer] machine   July had COVID   4000 iu for 2 weeks or so, 1200 mg calcium    2000 before that   The following portions of the patient's history were reviewed and updated as appropriate: allergies, current medications, past family history, past medical history, past social history, past surgical history and problem list.    Objective:  Vitals:  /68   Pulse 68   Temp (!) 96.6 °F (35.9 °C)   Resp 18   Ht 5' 10.5\" (1.791 m)   Wt (!) 136 kg (300 lb 6.4 oz)   SpO2 97%   BMI 42.49 kg/m²    Wt Readings from Last 3 Encounters:   10/25/24 (!) 136 kg (300 lb 6.4 oz)   09/17/24 132 kg (290 lb)   08/28/24 132 kg (290 lb)      BP Readings from Last 3 Encounters:   10/25/24 104/68   08/28/24 115/77   05/13/24 118/79        Review of Systems   He has no other concerns. No unexpected weight changes. No chest pain, SOB, or palpitations. No GERD. No changes in bowels or bladder. Sleeping well. No mood changes.     Physical Exam   Constitutional:  he appears well-developed and well-nourished.  HENT: Head: Normocephalic.   Neck: Neck supple.   Cardiovascular: Normal rate, regular rhythm and normal heart sounds.   Pulmonary/Chest: Effort normal and breath sounds normal. No wheezes, rales, or rhonchi.   Abdominal: Soft. Bowel sounds are normal. There is no tenderness. No hepatosplenomegaly.   Musculoskeletal: he exhibits no edema.   Lymphadenopathy: he has no cervical adenopathy.   Neurological: he is alert and oriented to person, place, and time.   Skin: Skin is warm and dry.   Psychiatric: he has a normal mood and affect. his behavior is normal. Thought content normal. "

## 2024-10-25 NOTE — ASSESSMENT & PLAN NOTE
Stable with hemoglobin A1c of 5.7  Reassured patient.  Continue to work on lowering carbohydrates in his diet.  Orders:    Comprehensive metabolic panel; Future    Lipid panel; Future    Hemoglobin A1C; Future    Vitamin D 25 hydroxy; Future

## 2024-10-28 ENCOUNTER — ANESTHESIA (OUTPATIENT)
Dept: PERIOP | Facility: HOSPITAL | Age: 69
End: 2024-10-28
Payer: MEDICARE

## 2024-10-28 ENCOUNTER — APPOINTMENT (OUTPATIENT)
Dept: RADIOLOGY | Facility: HOSPITAL | Age: 69
End: 2024-10-28
Payer: MEDICARE

## 2024-10-28 ENCOUNTER — HOSPITAL ENCOUNTER (OUTPATIENT)
Facility: HOSPITAL | Age: 69
Setting detail: OUTPATIENT SURGERY
Discharge: HOME | End: 2024-10-28
Attending: ORTHOPAEDIC SURGERY | Admitting: ORTHOPAEDIC SURGERY
Payer: MEDICARE

## 2024-10-28 VITALS
WEIGHT: 296.6 LBS | HEART RATE: 60 BPM | DIASTOLIC BLOOD PRESSURE: 61 MMHG | OXYGEN SATURATION: 93 % | HEIGHT: 71 IN | BODY MASS INDEX: 41.52 KG/M2 | TEMPERATURE: 97.2 F | RESPIRATION RATE: 22 BRPM | SYSTOLIC BLOOD PRESSURE: 108 MMHG

## 2024-10-28 DIAGNOSIS — M21.6X1 HINDFOOT VARUS, ACQUIRED, RIGHT: Primary | ICD-10-CM

## 2024-10-28 DIAGNOSIS — M19.171 POST-TRAUMATIC ARTHRITIS OF RIGHT ANKLE: ICD-10-CM

## 2024-10-28 PROCEDURE — C1713 ANCHOR/SCREW BN/BN,TIS/BN: HCPCS | Performed by: ORTHOPAEDIC SURGERY

## 2024-10-28 PROCEDURE — 27702 RECONSTRUCT ANKLE JOINT: CPT | Performed by: ORTHOPAEDIC SURGERY

## 2024-10-28 PROCEDURE — 73600 X-RAY EXAM OF ANKLE: CPT

## 2024-10-28 PROCEDURE — C1776 JOINT DEVICE (IMPLANTABLE): HCPCS | Performed by: ORTHOPAEDIC SURGERY

## 2024-10-28 PROCEDURE — C9290 INJ, BUPIVACAINE LIPOSOME: HCPCS | Performed by: ANESTHESIOLOGY

## 2024-10-28 PROCEDURE — 27696 REPAIR OF ANKLE LIGAMENTS: CPT | Performed by: ORTHOPAEDIC SURGERY

## 2024-10-28 PROCEDURE — 27685 REVISION OF LOWER LEG TENDON: CPT | Performed by: ORTHOPAEDIC SURGERY

## 2024-10-28 DEVICE — FIXED BEARING TIBIAL PLATE,  3D+ TIBIA
Type: IMPLANTABLE DEVICE | Site: ANKLE | Status: FUNCTIONAL
Brand: VANTAGE

## 2024-10-28 DEVICE — IMPLANTABLE DEVICE: Type: IMPLANTABLE DEVICE | Site: ANKLE | Status: FUNCTIONAL

## 2024-10-28 DEVICE — FIXED BEARING TIBIAL LOCKING CLIP,  3D/3D+ TIBIA
Type: IMPLANTABLE DEVICE | Site: ANKLE | Status: FUNCTIONAL
Brand: VANTAGE

## 2024-10-28 DEVICE — FIXED BEARING TIBIAL INSERT
Type: IMPLANTABLE DEVICE | Site: ANKLE | Status: FUNCTIONAL
Brand: VANTAGE®, ACTIVIT-E

## 2024-10-28 DEVICE — SONICANCHOR KIT
Type: IMPLANTABLE DEVICE | Site: ANKLE | Status: FUNCTIONAL
Brand: SONICANCHOR

## 2024-10-28 RX ORDER — MIDAZOLAM HYDROCHLORIDE 2 MG/2ML
INJECTION, SOLUTION INTRAMUSCULAR; INTRAVENOUS AS NEEDED
Status: DISCONTINUED | OUTPATIENT
Start: 2024-10-28 | End: 2024-10-28

## 2024-10-28 RX ORDER — BUPIVACAINE HYDROCHLORIDE 2.5 MG/ML
INJECTION, SOLUTION EPIDURAL; INFILTRATION; INTRACAUDAL
Status: COMPLETED | OUTPATIENT
Start: 2024-10-28 | End: 2024-10-28

## 2024-10-28 RX ORDER — PROPOFOL 10 MG/ML
INJECTION, EMULSION INTRAVENOUS AS NEEDED
Status: DISCONTINUED | OUTPATIENT
Start: 2024-10-28 | End: 2024-10-28

## 2024-10-28 RX ORDER — FENTANYL CITRATE/PF 50 MCG/ML
25 SYRINGE (ML) INJECTION
Status: DISCONTINUED | OUTPATIENT
Start: 2024-10-28 | End: 2024-10-28 | Stop reason: HOSPADM

## 2024-10-28 RX ORDER — DEXAMETHASONE SODIUM PHOSPHATE 10 MG/ML
INJECTION, SOLUTION INTRAMUSCULAR; INTRAVENOUS AS NEEDED
Status: DISCONTINUED | OUTPATIENT
Start: 2024-10-28 | End: 2024-10-28

## 2024-10-28 RX ORDER — GLYCOPYRROLATE 0.2 MG/ML
INJECTION INTRAMUSCULAR; INTRAVENOUS AS NEEDED
Status: DISCONTINUED | OUTPATIENT
Start: 2024-10-28 | End: 2024-10-28

## 2024-10-28 RX ORDER — CEFAZOLIN SODIUM 1 G/50ML
1000 SOLUTION INTRAVENOUS ONCE
Status: DISCONTINUED | OUTPATIENT
Start: 2024-10-28 | End: 2024-10-28 | Stop reason: HOSPADM

## 2024-10-28 RX ORDER — DIPHENHYDRAMINE HYDROCHLORIDE 50 MG/ML
12.5 INJECTION INTRAMUSCULAR; INTRAVENOUS ONCE AS NEEDED
Status: CANCELLED | OUTPATIENT
Start: 2024-10-28

## 2024-10-28 RX ORDER — ONDANSETRON 4 MG/1
4 TABLET, FILM COATED ORAL EVERY 8 HOURS PRN
Qty: 10 TABLET | Refills: 0 | Status: SHIPPED | OUTPATIENT
Start: 2024-10-28

## 2024-10-28 RX ORDER — NEOSTIGMINE METHYLSULFATE 1 MG/ML
INJECTION INTRAVENOUS AS NEEDED
Status: DISCONTINUED | OUTPATIENT
Start: 2024-10-28 | End: 2024-10-28

## 2024-10-28 RX ORDER — ALBUTEROL SULFATE 0.83 MG/ML
2.5 SOLUTION RESPIRATORY (INHALATION) ONCE AS NEEDED
Status: DISCONTINUED | OUTPATIENT
Start: 2024-10-28 | End: 2024-10-28 | Stop reason: HOSPADM

## 2024-10-28 RX ORDER — ALBUTEROL SULFATE 0.83 MG/ML
2.5 SOLUTION RESPIRATORY (INHALATION) ONCE AS NEEDED
Status: CANCELLED | OUTPATIENT
Start: 2024-10-28

## 2024-10-28 RX ORDER — FENTANYL CITRATE 50 UG/ML
INJECTION, SOLUTION INTRAMUSCULAR; INTRAVENOUS AS NEEDED
Status: DISCONTINUED | OUTPATIENT
Start: 2024-10-28 | End: 2024-10-28

## 2024-10-28 RX ORDER — ONDANSETRON 2 MG/ML
INJECTION INTRAMUSCULAR; INTRAVENOUS AS NEEDED
Status: DISCONTINUED | OUTPATIENT
Start: 2024-10-28 | End: 2024-10-28

## 2024-10-28 RX ORDER — OXYCODONE HYDROCHLORIDE 5 MG/1
5 TABLET ORAL ONCE AS NEEDED
Status: COMPLETED | OUTPATIENT
Start: 2024-10-28 | End: 2024-10-28

## 2024-10-28 RX ORDER — SODIUM CHLORIDE, SODIUM LACTATE, POTASSIUM CHLORIDE, CALCIUM CHLORIDE 600; 310; 30; 20 MG/100ML; MG/100ML; MG/100ML; MG/100ML
INJECTION, SOLUTION INTRAVENOUS CONTINUOUS PRN
Status: DISCONTINUED | OUTPATIENT
Start: 2024-10-28 | End: 2024-10-28

## 2024-10-28 RX ORDER — CHLORHEXIDINE GLUCONATE ORAL RINSE 1.2 MG/ML
15 SOLUTION DENTAL ONCE
Status: COMPLETED | OUTPATIENT
Start: 2024-10-28 | End: 2024-10-28

## 2024-10-28 RX ORDER — VANCOMYCIN HYDROCHLORIDE 1 G/20ML
INJECTION, POWDER, LYOPHILIZED, FOR SOLUTION INTRAVENOUS AS NEEDED
Status: DISCONTINUED | OUTPATIENT
Start: 2024-10-28 | End: 2024-10-28 | Stop reason: HOSPADM

## 2024-10-28 RX ORDER — CHLORHEXIDINE GLUCONATE 40 MG/ML
SOLUTION TOPICAL DAILY PRN
Status: DISCONTINUED | OUTPATIENT
Start: 2024-10-28 | End: 2024-10-28 | Stop reason: HOSPADM

## 2024-10-28 RX ORDER — FENTANYL CITRATE/PF 50 MCG/ML
25 SYRINGE (ML) INJECTION
Status: CANCELLED | OUTPATIENT
Start: 2024-10-28

## 2024-10-28 RX ORDER — OXYCODONE HYDROCHLORIDE 5 MG/1
5 TABLET ORAL EVERY 4 HOURS PRN
Qty: 30 TABLET | Refills: 0 | Status: SHIPPED | OUTPATIENT
Start: 2024-10-28

## 2024-10-28 RX ORDER — ONDANSETRON 2 MG/ML
4 INJECTION INTRAMUSCULAR; INTRAVENOUS ONCE AS NEEDED
Status: DISCONTINUED | OUTPATIENT
Start: 2024-10-28 | End: 2024-10-28 | Stop reason: HOSPADM

## 2024-10-28 RX ORDER — LIDOCAINE HYDROCHLORIDE 10 MG/ML
INJECTION, SOLUTION EPIDURAL; INFILTRATION; INTRACAUDAL; PERINEURAL AS NEEDED
Status: DISCONTINUED | OUTPATIENT
Start: 2024-10-28 | End: 2024-10-28

## 2024-10-28 RX ORDER — DIPHENHYDRAMINE HYDROCHLORIDE 50 MG/ML
12.5 INJECTION INTRAMUSCULAR; INTRAVENOUS ONCE AS NEEDED
Status: DISCONTINUED | OUTPATIENT
Start: 2024-10-28 | End: 2024-10-28 | Stop reason: HOSPADM

## 2024-10-28 RX ORDER — CEFAZOLIN SODIUM 2 G/50ML
2000 SOLUTION INTRAVENOUS ONCE
Status: COMPLETED | OUTPATIENT
Start: 2024-10-28 | End: 2024-10-28

## 2024-10-28 RX ORDER — ONDANSETRON 2 MG/ML
4 INJECTION INTRAMUSCULAR; INTRAVENOUS ONCE AS NEEDED
Status: CANCELLED | OUTPATIENT
Start: 2024-10-28

## 2024-10-28 RX ORDER — ROCURONIUM BROMIDE 10 MG/ML
INJECTION, SOLUTION INTRAVENOUS AS NEEDED
Status: DISCONTINUED | OUTPATIENT
Start: 2024-10-28 | End: 2024-10-28

## 2024-10-28 RX ORDER — ASPIRIN 81 MG/1
81 TABLET ORAL 2 TIMES DAILY
Qty: 84 TABLET | Refills: 0 | Status: SHIPPED | OUTPATIENT
Start: 2024-10-28 | End: 2024-12-09

## 2024-10-28 RX ADMIN — NEOSTIGMINE METHYLSULFATE 3 MG: 1 INJECTION INTRAVENOUS at 13:02

## 2024-10-28 RX ADMIN — FENTANYL CITRATE 50 MCG: 50 INJECTION, SOLUTION INTRAMUSCULAR; INTRAVENOUS at 10:43

## 2024-10-28 RX ADMIN — BUPIVACAINE HYDROCHLORIDE 10 ML: 2.5 INJECTION, SOLUTION EPIDURAL; INFILTRATION; INTRACAUDAL; PERINEURAL at 10:06

## 2024-10-28 RX ADMIN — DEXMEDETOMIDINE 12 MCG: 100 INJECTION, SOLUTION, CONCENTRATE INTRAVENOUS at 12:50

## 2024-10-28 RX ADMIN — SODIUM CHLORIDE, SODIUM LACTATE, POTASSIUM CHLORIDE, AND CALCIUM CHLORIDE: .6; .31; .03; .02 INJECTION, SOLUTION INTRAVENOUS at 09:59

## 2024-10-28 RX ADMIN — FENTANYL CITRATE 50 MCG: 50 INJECTION, SOLUTION INTRAMUSCULAR; INTRAVENOUS at 12:54

## 2024-10-28 RX ADMIN — CHLORHEXIDINE GLUCONATE 15 ML: 1.2 RINSE ORAL at 09:29

## 2024-10-28 RX ADMIN — FENTANYL CITRATE 50 MCG: 50 INJECTION, SOLUTION INTRAMUSCULAR; INTRAVENOUS at 13:20

## 2024-10-28 RX ADMIN — OXYCODONE HYDROCHLORIDE 5 MG: 5 TABLET ORAL at 14:05

## 2024-10-28 RX ADMIN — SODIUM CHLORIDE, SODIUM LACTATE, POTASSIUM CHLORIDE, AND CALCIUM CHLORIDE: .6; .31; .03; .02 INJECTION, SOLUTION INTRAVENOUS at 12:17

## 2024-10-28 RX ADMIN — MIDAZOLAM 2 MG: 1 INJECTION INTRAMUSCULAR; INTRAVENOUS at 10:00

## 2024-10-28 RX ADMIN — CEFAZOLIN SODIUM 3000 MG: 2 SOLUTION INTRAVENOUS at 10:50

## 2024-10-28 RX ADMIN — ONDANSETRON 4 MG: 2 INJECTION INTRAMUSCULAR; INTRAVENOUS at 12:44

## 2024-10-28 RX ADMIN — DEXAMETHASONE SODIUM PHOSPHATE 10 MG: 10 INJECTION, SOLUTION INTRAMUSCULAR; INTRAVENOUS at 11:03

## 2024-10-28 RX ADMIN — DEXMEDETOMIDINE 8 MCG: 100 INJECTION, SOLUTION, CONCENTRATE INTRAVENOUS at 12:40

## 2024-10-28 RX ADMIN — DEXMEDETOMIDINE 8 MCG: 100 INJECTION, SOLUTION, CONCENTRATE INTRAVENOUS at 12:32

## 2024-10-28 RX ADMIN — ROCURONIUM BROMIDE 20 MG: 10 INJECTION, SOLUTION INTRAVENOUS at 12:05

## 2024-10-28 RX ADMIN — GLYCOPYRROLATE 0.5 MG: 0.2 INJECTION INTRAMUSCULAR; INTRAVENOUS at 13:02

## 2024-10-28 RX ADMIN — LIDOCAINE HYDROCHLORIDE 50 MG: 10 INJECTION, SOLUTION EPIDURAL; INFILTRATION; INTRACAUDAL; PERINEURAL at 10:43

## 2024-10-28 RX ADMIN — BUPIVACAINE HYDROCHLORIDE 10 ML: 2.5 INJECTION, SOLUTION EPIDURAL; INFILTRATION; INTRACAUDAL at 10:03

## 2024-10-28 RX ADMIN — PROPOFOL 200 MG: 10 INJECTION, EMULSION INTRAVENOUS at 10:43

## 2024-10-28 RX ADMIN — FENTANYL CITRATE 50 MCG: 50 INJECTION, SOLUTION INTRAMUSCULAR; INTRAVENOUS at 10:50

## 2024-10-28 RX ADMIN — DEXMEDETOMIDINE 12 MCG: 100 INJECTION, SOLUTION, CONCENTRATE INTRAVENOUS at 12:34

## 2024-10-28 RX ADMIN — BUPIVACAINE 10 ML: 13.3 INJECTION, SUSPENSION, LIPOSOMAL INFILTRATION at 10:06

## 2024-10-28 RX ADMIN — ROCURONIUM BROMIDE 50 MG: 10 INJECTION, SOLUTION INTRAVENOUS at 10:44

## 2024-10-28 NOTE — INTERVAL H&P NOTE
H&P reviewed. After examining the patient I find no changes in the patients condition since the H&P had been written.    Vitals:    10/28/24 0921   BP: 127/74   Pulse: 62   Resp: 18   Temp: (!) 97.3 °F (36.3 °C)   SpO2: 97%     Plan for right ankle replacement with possible foot correction as needed.

## 2024-10-28 NOTE — ANESTHESIA POSTPROCEDURE EVALUATION
Post-Op Assessment Note    CV Status:  Stable  Pain Score: 0    Pain management: adequate       Mental Status:  Alert and awake   Hydration Status:  Euvolemic   PONV Controlled:  Controlled   Airway Patency:  Patent     Post Op Vitals Reviewed: Yes    No anethesia notable event occurred.    Staff: CRNA       Last Filed PACU Vitals:  Vitals Value Taken Time   Temp 97.2 °F (36.2 °C) 10/28/24 1315   Pulse 64 10/28/24 1323   /56 10/28/24 1317   Resp 20 10/28/24 1323   SpO2 94 % 10/28/24 1323   Vitals shown include unfiled device data.    Modified Louann:  No data recorded

## 2024-10-28 NOTE — ANESTHESIA PROCEDURE NOTES
Peripheral Block    Patient location during procedure: holding area  Start time: 10/28/2024 10:06 AM  Reason for block: at surgeon's request and post-op pain management  Staffing  Performed by: Angie Bautista MD  Authorized by: Angie Bautista MD    Preanesthetic Checklist  Completed: patient identified, IV checked, site marked, risks and benefits discussed, surgical consent, monitors and equipment checked, pre-op evaluation and timeout performed  Peripheral Block  Patient position: supine  Prep: ChloraPrep  Patient monitoring: frequent blood pressure checks, continuous pulse oximetry and heart rate  Block type: Adductor Canal  Laterality: right  Injection technique: single-shot  Procedures: ultrasound guided, Ultrasound guidance required for the procedure to increase accuracy and safety of medication placement and decrease risk of complications.  Ultrasound permanent image saved  bupivacaine (PF) (MARCAINE) 0.25 % injection 20 mL - Perineural   10 mL - 10/28/2024 10:06:00 AM  bupivacaine liposomal (EXPAREL) 1.3 % injection 20 mL - Perineural   10 mL - 10/28/2024 10:06:00 AM  Needle  Needle type: Stimuplex   Needle length: 4 in  Needle localization: anatomical landmarks and ultrasound guidance  Assessment  Injection assessment: incremental injection, frequent aspiration, injected with ease, negative aspiration, negative for heart rate change, no paresthesia on injection, no symptoms of intraneural/intravenous injection and needle tip visualized at all times  Paresthesia pain: none  Post-procedure:  site cleaned  patient tolerated the procedure well with no immediate complications

## 2024-10-28 NOTE — DISCHARGE INSTR - AVS FIRST PAGE
James R Lachman, M.D.  Attending, Orthopaedic Surgery  [facility]  [place] Office Phone: [phone] ? Fax: [phone]  [place] Office Phone: [phone] ? Fax:[phone]    : June Vanegas MA    Surgery Coordinators [place]: Sol Culp, [phone]  Ashlee Jaspreet, [phone]  Surgery Coordinator [place]:  Parviz Ying, [phone]                                                        Chiara Duvall, [phone]                                                                                                                      [email or web address]   POST-OPERATIVE INSTRUCTIONS    General Information:  Typical post operative visits are at the following intervals:  2-3 weeks post surgery, 6 weeks post surgery, 3 months post surgery, 6 months post surgery, and then on a yearly basis.  However, this may change based on Dr. Lachmans’ recommendation.  #1 post-operative rule for foot/ankle surgery:  ONCE YOU ARE OUT OF YOUR CAST AND/OR REMOVABLE BOOT, SWELLING MAY PERSIST FOR MANY MONTHS.  YOU MIGHT ALSO EXPERIENCE A BLUISH DISCOLORATION OF YOUR LEG.  THIS IS NORMAL AND PART OF THE USUAL POSTOPERATIVE EXPERIENCE.  DO NOT WAIT UNTIL YOUR BLOCK WEARS OFF TO TAKE YOUR PAIN MEDICATION.  IT TAKES A FEW DOSES OF THE PAIN MEDICATION TO REACH A THERAPEUTIC LEVEL.  TAKE A TABLET PROACTIVELY BEFORE YOU HAVE ANY PAIN AND AGAIN 4 HOURS LATER SO WHEN THE BLOCK WEARS OFF, YOU ARE NOT CAUGHT OFF GUARD.    SMOKING:  Smoking results in incomplete healing of fractures (broken bones) and joints that my have been fused.  Smoking and nicotine also prevents the growth of bone into ankle replacements and bone healing.  It also slows the healing of muscles and skin (soft tissue).  Therefore, please do not have surgery if you continue to smoke.  We reserve the right to cancel your surgery if we suspect that you are smoking.  DO NOT use  nicorette gum or other patches.  Please find an alternative method to quit smoking before your surgery and do not restart after surgery to allow for healing.      THREE RULES:    After surgery you will most likely be given the instructions “KEEP YOUR TOES ABOVE YOUR NOSE.”  This means that you MUST have your feet elevated higher than your heart.  Keeping your toes above your nose helps to heal the muscles and skin (soft tissues) by reducing swelling in your leg.  This position also helps to prevent infection, and is very important in avoiding deep venous thrombosis (blood clots).    In order to keep the blood circulating in your legs and in order to avoid deep vein   thrombosis (blood clots), we ask patients to GET UP ONCE AN HOUR during the day.  This means you should at least cross the room and come back.  It does not mean you have to be up for long periods of time.  In most cases we will not have people immediately put any weight on their operated part.  This is important to prevent loosening of metal or other devices holding the bones together.  It also prevents irritation of the soft tissues which can lead to prolonged healing.  When we say get up once an hour, please walk, hop or move with an assisted device.  This is important!    Do not do any excessive walking during the first few days after surgery.  Recovering from surgery is a full-time task for the patient.  Postoperative care is important to avoid irritating the skin incision, which can lead to infection.  Please do not plan activities or go out of town for several weeks after surgery.        AFTER YOUR SURGERY:  Bleeding through the bandage almost always occurs.  Do not let this alarm you.  Simply overwrap with an ABD pad and Ace bandage (The nurses discharging your from the day of surgery will provide this.)   If you think it is excessive, you can come in early for a dressing change (at around 1 week instead of 3 weeks postop.)    Do not get the  bandage wet.  Showering is possible with plastic protectors.   Be very careful, as the bathroom can be wet and slippery.  If you do get your dressing wet, it should be changed immediately.  Please contact us.      ONCE YOUR ARE OUT OF YOUR CAST AND/OR REMOVABLE BOOT, SWELLING MAY PERSIST FOR MANY MONTHS.  THERE WILL ALSO BE A BLUISH DISCOLORATION OF YOUR LEG FOR MONTHS.  THIS IS NORMAL AND PART OF THE USUAL POSTOPERATIVE EXPERIENCE.  WEARING COMPRESSION HOSE (ELASTIC STOCKINGS) CAN HELP AVOID SOME OF THIS SWELLING.    Ice the area 20 minutes every hour once the nerve block wears off. If you are in a cast or a splint, you may need to leave the ice on longer than 20 minutes in order to feel any benefits.       DRESSING:   The purpose of the surgical dressing is to keep your wound and the surgical site protected from the environment.  Most dressings contain splints, which help to hold your foot and ankle in a corrected position, and also allow the surgical site to heal properly. IF YOU GO TO THE EMERGENCY ROOM FOR ANY REASON, AND THEY REMOVE YOUR DRESSING OR SPLINT, YOU MUST BE SEEN IN DR. LACHMANS CLINIC TO HAVE IT REPLACED) AS SOON AS POSSIBLE (IDEALLY THE NEXT DAY).   If you have a drain in place, this will need to be removed in 1 day after surgery.  The time for the drain to be pulled will be written on your discharge instruction sheet.    CAST  INSTRUCTIONS:  You may or may not get a cast following surgery.  If you do, pay close attention to the following:    After application of a splint or cast, it is very important to elevate your leg for 24 to 72 hours.   The injured area should be elevated well above the heart.   Remember “Toes above your Nose”.  Rest and elevation greatly reduce pain and speed the healing process by minimizing early swelling.    CALL YOUR DOCTORS OFFICE OR VISIT LOCATION EMERGENCY ROOM IF YOU HAVE ANY OF THE FOLLOWING:    Significant increased pain, which may be caused by swelling (Strict  elevation will alleviate this)  Numbness and tingling in your hand or foot, which may be caused by too much pressure on the nerves (There is always numbness after surgery due to nerve blocks)  Burning and stinging, which may be caused by too much pressure on the skin  Excessive swelling below the cast, which may mean the cast is slowing your blood circulation  Loss of active movement of toes, which request an urgent evaluation (if you have had a nerve block, this is an expected thing and totally normal)  Loss of “capillary refill”.  Pinch the tip of toes and brijesh the skin.  Release pressure and if the skin does not return pink then call the office immediately.      DO NOT GET YOUR CAST WET.   Bacteria thrive in moist dark areas.  We do not want this.   If your cast becomes wet, return to the office and we will apply another one.    PAIN AFTER SURGERY:  Narcotic pain medication can and will depress your respiratory system if taken in excess.  The goal of pain management with narcotics is to be comfortable not pain free.  If you take enough narcotics to be pain free then you run the risk of stopping breathing.  If this happens, call 911 immediately!  Pain in the heel is often  caused by pressure from the weight of your foot on the bed.  Make sure your heel is suspended off the bed by keeping a pillow underneath your calf not your knee.    Medications:  You will be given narcotic pain medication. Do NOT drive while taking narcotic medications. Medications such as Darvocet, Percocet, Vicoden or Tylenol #3, also contain acetaminophen (Tylenol). Do not take acetaminophen or Tylenol from home when taking theses medications. When you fill your prescription, you may ask the pharmacist if your pain medication has acetaminophen/Tylenol in it. It is okay to take Tylenol with Oxycontin/Oxycodone.   Unless you are allergic to aspirin or currently taking a blood thinner, Dr. Lachmans’ patients are requested to take one 81 mg  aspirin every 12 hours until you are back to walking normally after surgery (This can be up to 6 weeks). Ecotrin (Enteric-coated aspirin) is more sensitive to the stomach and we recommend purchasing this instead of regular aspirin to minimize the risk of stomach irritation.  Narcotic medications commonly cause nausea. Taking them with food will decrease this side effect. If you are having extreme nausea, please contact us for an alternative medication or for something that can be taken with this medication to decrease the nausea.   Also, narcotic medications frequently cause constipation. An increase of fiber, fruits and vegetables in your diet may alleviate this problem, or if necessary, you may use an over-the-counter medication such as senekot, colace, or Fibercon for constipation problems.   You should resume all medications you were taking prior to the surgery unless otherwise specified.   If you had fracture surgery, bony surgery like an osteotomy or fusion, or a surgery that requires bone healing, you are advised to take Vitamin D and Calcium to improve healing potential.  Vitamin D3 4000 units/day and Calcium 1200mg/day. These are over the counter medications so please pick them up at the pharmacy when you are picking up your prescriptions.    Activity:   Because of your recent foot surgery, your activity level will decrease. You will need to elevate your foot ABOVE the level of your heart for a minimum of four days. The length of time necessary for the swelling to go down, and for your wounds to heal properly depends greatly on your efforts here. Elevation is extremely important to avoid compromising the blood supply to your foot. Remember when your foot is down it will swell, which will increase pain and slow healing. Wiggle your toes frequently if possible.   If you go home with a regional block, (a type of anesthesia) the foot and leg will be numb. Think of ways to get into your house and around the  house until the block wears off.   Keep in mind that it may be a legal issue if you drive while in a cast or splint, especially when the splint is on the right foot. You may call the Department of Motor Vehicles to schedule a road test if you have adaptive equipment applied to your car.   The amount of weight you are allowed to bear on your foot will be written on your discharge sheet filled out at the time of surgery. The following is an explanation of the possibilities:     Non-weight bearing:   You are to put NO weight whatsoever on your foot. When using crutches or a walker, your foot should not touch the ground, except when you are standing. Then, it may rest on the ground. If you are to be non-weight bearing, and you are not compliant, you could compromise the surgery.     Some of our patients have been requesting prescriptions for a roll-a-bout knee scooter. [employer] and other insurances have been denying these claims, and you may either have to rent one or pay out of pocket to purchase one.

## 2024-10-28 NOTE — ANESTHESIA PREPROCEDURE EVALUATION
Procedure:  ARTHROPLASTY ANKLE, Brostrom, Deltoid Release, Possible 1st Metatarsal dorsiflexion osteotomy, Possible achilles lengthening (Right: Ankle)    Relevant Problems   CARDIO   (+) Pure hypercholesterolemia      GI/HEPATIC   (+) Gastro-esophageal reflux disease without esophagitis      MUSCULOSKELETAL   (+) Osteoarthritis of knee   (+) Post-traumatic arthritis of right ankle      NEURO/PSYCH   (+) MDD (major depressive disorder), recurrent episode, moderate (HCC)      PULMONARY   (+) GILLIAN on CPAP      BMI 42.5  Physical Exam    Airway    Mallampati score: III  TM Distance: >3 FB  Neck ROM: full     Dental   No notable dental hx     Cardiovascular      Pulmonary      Other Findings        Anesthesia Plan  ASA Score- 3     Anesthesia Type- general with ASA Monitors.         Additional Monitors:     Airway Plan:     Comment: Pre-op adductor canal and popliteal nerve block with exparel.       Plan Factors-    Chart reviewed.    Patient summary reviewed.                  Induction- intravenous.    Postoperative Plan- Plan for postoperative opioid use. Planned trial extubation    Perioperative Resuscitation Plan - Level 1 - Full Code.       Informed Consent- Anesthetic plan and risks discussed with patient.  I personally reviewed this patient with the CRNA. Discussed and agreed on the Anesthesia Plan with the CRNA..

## 2024-10-28 NOTE — ANESTHESIA PROCEDURE NOTES
Peripheral Block    Patient location during procedure: holding area  Start time: 10/28/2024 10:03 AM  Reason for block: at surgeon's request and post-op pain management  Staffing  Performed by: Angie Bautista MD  Authorized by: Angie Bautista MD    Preanesthetic Checklist  Completed: patient identified, IV checked, site marked, risks and benefits discussed, surgical consent, monitors and equipment checked, pre-op evaluation and timeout performed  Peripheral Block  Patient position: left lateral  Prep: ChloraPrep  Patient monitoring: frequent blood pressure checks, continuous pulse oximetry and heart rate  Block type: Popliteal  Laterality: right  Injection technique: single-shot  Procedures: ultrasound guided, Ultrasound guidance required for the procedure to increase accuracy and safety of medication placement and decrease risk of complications.  Ultrasound permanent image saved  bupivacaine (PF) (MARCAINE) 0.25 % injection 20 mL - Perineural   10 mL - 10/28/2024 10:03:00 AM  bupivacaine liposomal (EXPAREL) 1.3 % injection 20 mL - Perineural   10 mL - 10/28/2024 10:03:00 AM  Needle  Needle type: Stimuplex   Needle length: 4 in  Needle localization: anatomical landmarks and ultrasound guidance  Assessment  Injection assessment: incremental injection, frequent aspiration, injected with ease, negative aspiration, negative for heart rate change, no paresthesia on injection, no symptoms of intraneural/intravenous injection and needle tip visualized at all times  Paresthesia pain: none  Post-procedure:  site cleaned  patient tolerated the procedure well with no immediate complications

## 2024-10-28 NOTE — OP NOTE
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 Brostrom. Deltoid Release. 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 Patient was given appropriate perioperative IV antibiotics, taken to the operating room, and placed supine on the operating table. Further anesthesia was administered without complication (see anesthesia report). The operative extremity was prepped and draped in sterile fashion. The operative lower extremity was exsanguinated with an elastic bandage and a proximally applied thigh tourniquet was inflated. Timeout was performed with the attending surgeon in the room.      Approach through placement of trial implants  A longitudinal incision was made  on the anterior aspect of the ankle and careful soft tissue dissection was maintained. The superficial peroneal nerve was protected throughout the procedure. The extensor retinaculum was divided longitudinally, and the deep neurovascular bundle and extensor tendons were protected throughout the procedure. The anterior capsule was divided and reflected. The joint was exposed. There was extensive arthritis throughout the ankle. Osteophytes were removed from the anterior ankle. The ankle was mobilized. The osteophytes were removed medially and laterally, and soft tissue was cleared from the medial and lateral gutters.       A stab incision was made in the proximal tibia, and from this, an external tibial alignment guide was suspended. We adjusted the proper position for the guide while the and pinned it in place. Rotation was carefully controlled based on the axis of rotation in the sagittal plane of the talus. The resection level was then properly set, and based on intraoperative fluoroscopy, this was confirmed. The tibial cutting block was then positioned and properly pinned at the optimal resection level and confirmed fluoroscopically. There was no violation of the medial malleolus suggested with the pinning of the tibial capture guide. Two pins were placed and the medial and lateral aspects of the tibial plafond were then weakened w/ the dedicated drill pin. With the soft tissues well protected, the oscillating saw was then utilized carefully to perform the tibial preparation. The reciprocating saw was used to make the vertical cut at the medial plafond. The capture guide was removed and the resected bone was evacuated from the joint.    With the ankle in appropriate neutral dorsiflexion position, and the hindfoot in physiologic valgus, the talar resection guide was pinned in proper position - this guide's position was confirmed on intraop fluoro interpretation.  Using the oscillating saw and protecting the medial  and lateral components and soft tissues, the talar bone/dome was ressected.  The external tibial alignment guide was then removed and the resected talar bone was evacuated from the joint.    We prepared the tibia for a 2cm stem.    Thorough irrigation was performed with copious amounts of sterile saline mixed with antibiotics. The talar sizing guide was then placed and pinned - proper position confirmed fluoroscopically. Chamfer cutting guide placed.  Chamfer cuts and milled surface prepared.  Guide removed, resected bone evacuated.  Rasp used to prepared talar dome.  Trial talus placed and proper position confirmed clinically and fluoroscopically.  Peg holes for the talus were reamed.  Trial tibia was placed then a trial poly placed - appropriate thickness of the poly was confirmed on clinical exam.  Intraop fluoro confirmed proper trial component position.  Some of the bone was resected from the posteromedial fibula and posterolateral medial malleolus in order to ensure that there was appropriate position for the tibial trial. The appropriately sized tibial trial was utilized with the polyethylene that provided optimal stability and there was 5 degrees of DF beyond neutral.       Placement of final implant through closure  Intraoperative fluoroscopy confirmed appropriate position for the components, and no evidence for stress fracture. The tibial trial was thus pinned in place, and there was no evidence for posterior liftoff on intraoperative fluoroscopy and no suggestion of stress fracture. Trial talus and trial poly removed.  Tibia prepared with punches and tibial trial removed.      Thorough irrigation was performed with copious amounts of sterile saline mixed with antibiotics. The tibial component was placed and properly impacted - proper position was confirmed on intraop fluoro.      The talar was impacted and its proper position confirmed on intraop fluoro.  Cement used.  Trial poly placed. The ankle could  be dorsiflexed to only neutral, to 0 degrees and was stable to valgus stress but unstable to varus stress.     We first made a poke hole incision using a fresh #15 blade into the achilles tendon 2cm above its insertion. We hemisected the medial half of the tendon through this incision. Next, we made another pokehole incision 2cm above the first and hemisected the lateral half of the achilles. Finally, we performed the same sequence one more time, this time medial again to complete the triple hemisection.  We then applied a gentle dorsiflexion force with the calcaneus held in the appropriate position until 20 degrees of dorsiflexion was achieved.    A curvilinear incision was made on the lateral ankle centered on the fibula.      Attention was turned anteriorly through this incision. Great care was taken not to violate the saphenous vein or the branches of the superficial peroneal nerve or the peroneal retinaculum. Dissection was carried down bluntly and sharply down to the level of the lateral fibula but not deep to the periosteum or the extensor retinaculum. A small periosteal flap was created sharply with a 15-blade along the distal fibula. The peroneal tendons were identified at the posterior aspect of this periosteal flap and protected throughout the length of the case. The ankle joint was entered. A bleeding bone trough along the distal fibula was made with a gabriel.     The ankle was then placed in valgus and dorsiflexion and no anterior translation. Two flipClass Sonic suture anchors were then placed in sequential fashion on the anterior surface of the fibula, being careful not to violate the ankle joint.The sutures were passed in sequential fashion through the proximal edges of the ATFL and CFL. The ankle was held in slight eversion with neutral location of the talus in the mortise. These were tied down and tensioned to recreate tension of the lateral structures. The previously made periosteal flap was then  sutured to the repaired lateral structures creating a pants over vest construct. There appeared to be good reconstruction of the lateral structures with adequate tension. The retinaculum and the periosteal flap were reinforced with Ethibond suture. The foot was held in dorsiflexion for the remainder of the case. The wound was copiously irrigated. The ankle was again tested and there was no longer translation with anterior drawer.      The poly was inserted and locking clip used to lock poly in position.   Intraop fluoroscopy suggested no posterior lift off of the tibial component. Thorough irrigation was again performed with copious amounts of sterile saline mixed w/ abx. Intraoperative fluoroscopy confirmed appropriate position of the components, both in the coronal and sagittal planes, including no posterior liftoff. There was no suggestion of stress fracture. The ankle was noted to be stable to varus/valgus stress and had satisfactory dorsiflexion. Vancomycin powder was placed into the wound bed.     The subcutaneous layer was then thoroughly irrigated with copious amounts of sterile saline. The superficial peroneal nerve was protected throughout the procedure. The subcutaneous layer was then reapproximated using vicryl suture in interrupted fashion, and the skin was reapproximated using nylon suture for the anterior wound w/ interrupted fashion for anterior wound and a tensionless closure. The pin in the proximal tibia was removed, and this wound was irrigated and closed with nylon suture. Sterile dressings were applied and with abundant padding with the ankle in neutral position, a shortleg splint was applied. Satisfactory capillary refill remained in all toes.      The patient tolerated the procedure well and was awakened from anesthesia without complication. He was taken to the recovery room in stable condition.      I was present for the entire procedure.    Patient Disposition:  PACU              SIGNATURE:  James R Lachman, MD  DATE: October 28, 2024  TIME: 1:19 PM

## 2024-10-29 NOTE — ANESTHESIA POSTPROCEDURE EVALUATION
Post-Op Assessment Note            No anethesia notable event occurred.    Staff: Anesthesiologist           Last Filed PACU Vitals:  Vitals Value Taken Time   Temp 97.2 °F (36.2 °C) 10/28/24 1315   Pulse 58 10/28/24 1424   /61 10/28/24 1420   Resp 27 10/28/24 1424   SpO2 94 % 10/28/24 1423   Vitals shown include unfiled device data.    Modified Louann:  Activity: 2 (10/28/2024  2:20 PM)  Respiration: 2 (10/28/2024  2:20 PM)  Circulation: 2 (10/28/2024  2:20 PM)  Consciousness: 2 (10/28/2024  2:20 PM)  Oxygen Saturation: 2 (10/28/2024  2:20 PM)  Modified Louann Score: 10 (10/28/2024  2:20 PM)

## 2024-10-31 ENCOUNTER — TELEPHONE (OUTPATIENT)
Dept: OBGYN CLINIC | Facility: CLINIC | Age: 69
End: 2024-10-31

## 2024-10-31 NOTE — TELEPHONE ENCOUNTER
Called and left message to come in at 10:00 on 11/15/24 due to the provider being out of the office at the time of his visit. Left message to call back if he can not make that appt time. [phone] to call back if he needs to reschedule .

## 2024-11-01 ENCOUNTER — TELEPHONE (OUTPATIENT)
Age: 69
End: 2024-11-01

## 2024-11-01 NOTE — TELEPHONE ENCOUNTER
Caller: Patient    Doctor: Lachman / [place]    Reason for call: Patient called in regards to ankle stating he was told to keep it propped up when in the car. Patient would like to know if he still needs to continue to do this, or if he can sit normally now; please advise.     Call back#: [phone]

## 2024-11-15 ENCOUNTER — OFFICE VISIT (OUTPATIENT)
Dept: OBGYN CLINIC | Facility: CLINIC | Age: 69
End: 2024-11-15

## 2024-11-15 VITALS — WEIGHT: 295 LBS | HEIGHT: 70 IN | BODY MASS INDEX: 42.23 KG/M2

## 2024-11-15 DIAGNOSIS — M21.6X1 ACQUIRED HINDFOOT VARUS, RIGHT: ICD-10-CM

## 2024-11-15 DIAGNOSIS — M19.171 POST-TRAUMATIC ARTHRITIS OF RIGHT ANKLE: Primary | ICD-10-CM

## 2024-11-15 PROCEDURE — 99024 POSTOP FOLLOW-UP VISIT: CPT | Performed by: ORTHOPAEDIC SURGERY

## 2024-11-15 NOTE — PATIENT INSTRUCTIONS
Continue aspirin/lovenox for blood clot prevention  May shower, do not soak in a tub/pool/ocean/etc for another 4 weeks.  Scar massage- pea sized amount of lotion, massage into scar for 5 minutes each day.  Compression stocking (Knee high, 20-30mm Hg) to be worn at all times while awake.  Recommend taking the following supplements: Vitamin D3- 4000 units per day and Calcium 1200 mg per day. This will help with bone healing.       Wear the boot at all times except when showering, even to sleep at night.

## 2024-11-15 NOTE — PROGRESS NOTES
"      James R Lachman, M.D.  Attending, Orthopaedic Surgery  Foot and Ankle  [facility]      ORTHOPAEDIC FOOT AND ANKLE POST-OP VISIT     Procedure:      Right vantage ankle replacement with deltoid release, modified brostrom, erick achilles lengthening       Date of surgery:   10/28/2024    PLAN  1. Weightbearing Status - NWB operative extremity  2. DVT prophylaxis - ASA 81 mg BID  3. Begin compression stocking for swelling control  4. Pain control - OTC pain medication  5. RTC in 3 week(s)  6. Xrays needed next visit - Yes total ankle series    History of Present Illness:   Chief Complaint:   Chief Complaint   Patient presents with    Post-op     Post op 3 weeks. Splint off and stiches out.  ARTHROPLASTY ANKLE, Brostrom, Deltoid Release, Achilles lengthening - Right       Gavino Hall is a 69 y.o. adult who is being seen for 3 week post-operative visit for the above procedure. Pain is well controlled and the patient has successfully transitioned to OTC pain medicines.  he is taking ASA 81 mg BID for DVT prophylaxis. Patient has been NWB in a Short leg cast      Review of Systems:  General- denies fever/chills  Respiratory- denies cough or SOB  Cardio- denies chest pain or palpitations  GI- denies abdominal pain  Musculoskeletal- Negative except noted above  Skin- denies rashes or wounds    Physical Exam:   Ht 5' 10\" (1.778 m)   Wt 134 kg (295 lb)   BMI 42.33 kg/m²   General/Constitutional: No apparent distress: well-nourished and well developed.  Eyes: normal ocular motion  Lymphatic: No appreciable lymphadenopathy  Respiratory: Non-labored breathing  Vascular: No edema, swelling or tenderness, except as noted in detailed exam.  Integumentary: No impressive skin lesions present, except as noted in detailed exam.  Neuro: No ataxia or tremors noted  Psych: Normal mood and affect, oriented to person, place and time. Appropriate affect.  Musculoskeletal: Normal, except as noted in detailed " exam and in HPI.    Examination    right        Incision Clean, dry, intact  Sutures Removed this visit    Ecchymosis none    Swelling mild    Sensation Intact to light touch throughout sural, saphenous, superficial peroneal, deep peroneal and medial/lateral plantar nerve distributions.  [place]-Tc 5.07 filament (10g) testing deferred.    Cardiovascular Brisk capillary refill < 2 seconds,intact DP and PT pulses    Special Tests None      Imaging Studies:   No new images    Scribe Attestation      I,:  Siomara Layton PA-C am acting as a scribe while in the presence of the attending physician.:       I,:  James R Lachman, MD personally performed the services described in this documentation    as scribed in my presence.:                James R. Lachman, MD  Foot & Ankle Surgery   Department of Orthopaedic Surgery  [facility]      I personally performed the service.    James R. Lachman, MD

## 2024-12-06 ENCOUNTER — APPOINTMENT (OUTPATIENT)
Dept: RADIOLOGY | Facility: CLINIC | Age: 69
End: 2024-12-06
Payer: MEDICARE

## 2024-12-06 ENCOUNTER — OFFICE VISIT (OUTPATIENT)
Dept: OBGYN CLINIC | Facility: CLINIC | Age: 69
End: 2024-12-06

## 2024-12-06 VITALS — HEIGHT: 70 IN | BODY MASS INDEX: 42.23 KG/M2 | WEIGHT: 295 LBS

## 2024-12-06 DIAGNOSIS — M19.171 POST-TRAUMATIC ARTHRITIS OF RIGHT ANKLE: ICD-10-CM

## 2024-12-06 DIAGNOSIS — M19.171 POST-TRAUMATIC ARTHRITIS OF RIGHT ANKLE: Primary | ICD-10-CM

## 2024-12-06 DIAGNOSIS — Z01.89 ENCOUNTER FOR LOWER EXTREMITY COMPARISON IMAGING STUDY: ICD-10-CM

## 2024-12-06 PROCEDURE — 73600 X-RAY EXAM OF ANKLE: CPT

## 2024-12-06 PROCEDURE — 99024 POSTOP FOLLOW-UP VISIT: CPT | Performed by: ORTHOPAEDIC SURGERY

## 2024-12-06 PROCEDURE — 73610 X-RAY EXAM OF ANKLE: CPT

## 2024-12-06 NOTE — PROGRESS NOTES
"      James R Lachman, M.D.  Attending, Orthopaedic Surgery  Foot and Ankle  [facility]      ORTHOPAEDIC FOOT AND ANKLE POST-OP VISIT     Procedure:      Right vantage ankle replacement with deltoid release, modified brostrom, erick achilles lengthening        Date of surgery:   10/28/2024       PLAN  1. Weightbearing Status- PWB operative extremity  2. DVT prophylaxis- ASA 81mg BID completed  3. Continue to elevate 23hrs/day getting up 1x per hour to prevent a blood clot  4. Pain control- OTC pain medication  5. RTC in 6 week(s)  6. Xrays needed next visit - yes Weightbearing Total ankle series    History of Present Illness:   Chief Complaint:   Chief Complaint   Patient presents with    Post-op     Post op 6 weeks. In cam boot. Everything going well.   ARTHROPLASTY ANKLE, Brostrom, Deltoid Release, Achilles lengthening - Right     Gavinonatacha Hall is a 69 y.o. adult who is being seen for post-operative visit for the above procedure. Pain is well controlled and the patient has successfully transitioned to OTC pain medicines.  he is taking ASA 325mg BID for DVT prophylaxis. Patient has been NWB in a CAM boot.      Review of Systems:  General- denies fever/chills  Respiratory- denies cough or SOB  Cardio- denies chest pain or palpitations  GI- denies abdominal pain  Musculoskeletal- Negative except noted above  Skin- denies rashes or wounds    Physical Exam:   Ht 5' 10\" (1.778 m)   Wt 134 kg (295 lb)   BMI 42.33 kg/m²   General/Constitutional: No apparent distress: well-nourished and well developed.  Eyes: normal ocular motion  Lymphatic: No appreciable lymphadenopathy  Respiratory: Non-labored breathing  Vascular: No edema, swelling or tenderness, except as noted in detailed exam.  Integumentary: No impressive skin lesions present, except as noted in detailed exam.  Neuro: No ataxia or tremors noted  Psych: Normal mood and affect, oriented to person, place and time. Appropriate " affect.  Musculoskeletal: Normal, except as noted in detailed exam and in HPI.    Examination    right        Incision Clean, dry, intact  Sutures Previously removed.    Ecchymosis none    Swelling Mild    Sensation Intact to light touch throughout sural, saphenous, superficial peroneal, deep peroneal and medial/lateral plantar nerve distributions.  [place]-Tc 5.07 filament (10g) testing deferred.    Cardiovascular Brisk capillary refill < 2 seconds,intact DP and PT pulses    Special Tests None      Imaging Studies:   6 views of the right ankle were taken, reviewed and interpreted independently that demonstrate Hardware in expected position without signs of weightbearing or loosening. Reviewed by me personally.        James R. Lachman, MD  Foot & Ankle Surgery   Department of Orthopaedic Surgery  [facility]      I personally performed the service.    James R. Lachman, MD

## 2024-12-06 NOTE — PATIENT INSTRUCTIONS
2 days of partial weight bearing 50%  Then, 2 days of partial weight bearing 75%  Then, 2 days of partial weight bearing 90%  Then full weight bearing in the boot and wean your crutches/rolling walker.    Then 3 weeks of weightbearing as tolerated in the CAM boot.    You may begin weaning your boot and transitioning to a sneaker (1/2). It is important to do this gradually to avoid aggravating the healing process.    1/2, you may come out of the boot into a sneaker for 2 hours.  2. 1/3, you may come out of the boot into a sneaker for 4 hours,  3. The next day, you may come out of the boot into a sneaker for 6 hours.  4. Continue this (adding 2 hours per day) as you tolerate. For example, if you do 6 hours out of the boot into a sneaker and your foot swells more than usual at night and it is difficult to control the discomfort, do not advance to 8 hours the next day, stay at 6 hours until you are able to tolerate it.    It is essential to follow this protocol and not modify this.  No matter when you begin weaning the boot, it will be difficult and there will be swelling and soreness.  If you spend more time than is recommended in the boot, this process becomes longer and more painful.    Elevation, Ice and tylenol and staying off of it at night will be important to aide in this transition out of the boot. Swelling and soreness are normal as you begin to do more with the injured leg.    May DC aspirin/lovenox, no longer needed.  May shower, do not soak in a tub/pool/ocean/etc for another 1 week.    Scar massage- pea sized amount of lotion, massage into scar for 5 minutes each day.  Compression stocking (Knee high, 20-30mm Hg) to be worn at all times while awake.  Recommend taking the following supplements: Vitamin D 4000 units per day and Calcium 1200 mg per day. This will help with bone healing.

## 2025-01-24 ENCOUNTER — OFFICE VISIT (OUTPATIENT)
Dept: OBGYN CLINIC | Facility: CLINIC | Age: 70
End: 2025-01-24

## 2025-01-24 ENCOUNTER — APPOINTMENT (OUTPATIENT)
Dept: RADIOLOGY | Facility: CLINIC | Age: 70
End: 2025-01-24
Payer: MEDICARE

## 2025-01-24 VITALS — WEIGHT: 297 LBS | BODY MASS INDEX: 42.52 KG/M2 | HEIGHT: 70 IN

## 2025-01-24 DIAGNOSIS — Z01.89 ENCOUNTER FOR LOWER EXTREMITY COMPARISON IMAGING STUDY: ICD-10-CM

## 2025-01-24 DIAGNOSIS — M19.171 POST-TRAUMATIC ARTHRITIS OF RIGHT ANKLE: ICD-10-CM

## 2025-01-24 DIAGNOSIS — M19.171 POST-TRAUMATIC ARTHRITIS OF RIGHT ANKLE: Primary | ICD-10-CM

## 2025-01-24 PROCEDURE — 99024 POSTOP FOLLOW-UP VISIT: CPT | Performed by: ORTHOPAEDIC SURGERY

## 2025-01-24 PROCEDURE — 73610 X-RAY EXAM OF ANKLE: CPT

## 2025-01-24 PROCEDURE — 73600 X-RAY EXAM OF ANKLE: CPT

## 2025-01-24 NOTE — PATIENT INSTRUCTIONS
Total Ankle Replacement Care    -No impact activity (No running, no jumping, no mogul skiing, etc.)    -Wear orthotics in all shoes at all times if prescribed. If no orthotics necessary, supportive shoes at all times.*    -Call our office for a prescription for antibiotics prior to any dental work/procedure    -Keep your follow-up visits.  Total ankle replacements need to be monitored at regular intervals to catch any potential issues early.    -Use compression stockings as needed to help control swelling    -Do not ignore sudden onset redness or swelling in the ankle. Come in to the clinic for evaluation right away if you have sudden onset redness or swelling in the ankle different from your typical swelling        [employer], [employer], [employer] are good brands but I recommend going to a dedicate shoe store (not [employer] or [employer].) At these types of stores, they have experts that can fit you for shoes appropriate for your foot problem. Shoe choice is essential to solving/improving most types of foot pain.  Even after a surgery, good shoes are necessary to keep the foot as comfortable as possible.    [employer]  [address] [facility] [address] [address]  [phone]    [employer], [place] [address]  [phone]    [employer]  [address] [place] [address] [phone]    [employer]   [address]  [phone]    [employer]  [address], [place] [address]  [phone]    [employer]  [address] [place] [address]  [phone]    [facility] [employer]   [address] [place] [address]  [phone]    [employer]  [address] [place], [place]  [phone]    [employer] [address], [address]  [phone]    [employer] [address], [address], [place] [address] [phone]

## 2025-01-24 NOTE — PROGRESS NOTES
James R Lachman, M.D.  Attending, Orthopaedic Surgery  Foot and Ankle  [facility]      ORTHOPAEDIC FOOT AND ANKLE POST-OP VISIT     Procedure:      Right vantage ankle replacement with deltoid release, modified Brostrom, erick achilles lengthening        Date of surgery:   10/28/2024      PLAN  1. Weightbearing Status - WBAT operative extremity  2. DVT prophylaxis - ASA 81 mg BID  3. Continue compression stocking for swelling control  4. Pain control - OTC pain medication  5. RTC in 3 month(s)  6. Xrays needed next visit - Yes total ankle series    History of Present Illness:   Chief Complaint:   Chief Complaint   Patient presents with    Post-op     Post op 3 months. Everything going well. Still a little stiff and numb.   ARTHROPLASTY ANKLE, Brostrom, Deltoid Release, Achilles lengthening - Right       Gavino Hall is a 69 y.o. male who is being seen for 3 month post-operative visit for the above procedure. Pain is well controlled and the patient has successfully transitioned to OTC pain medicines.  he has completed ASA 81 mg BID for DVT prophylaxis. Patient has been WBAT in a Supportive sneaker.    Review of Systems:  General- denies fever/chills  Respiratory- denies cough or SOB  Cardio- denies chest pain or palpitations  GI- denies abdominal pain  Musculoskeletal- Negative except noted above  Skin- denies rashes or wounds    Physical Exam:   There were no vitals taken for this visit.  General/Constitutional: No apparent distress: well-nourished and well developed.  Eyes: normal ocular motion  Lymphatic: No appreciable lymphadenopathy  Respiratory: Non-labored breathing  Vascular: No edema, swelling or tenderness, except as noted in detailed exam.  Integumentary: No impressive skin lesions present, except as noted in detailed exam.  Neuro: No ataxia or tremors noted  Psych: Normal mood and affect, oriented to person, place and time. Appropriate affect.  Musculoskeletal: Normal,  except as noted in detailed exam and in HPI.    Examination    right        Incision Clean, dry, intact  Sutures Previously removed.    Ecchymosis none    Swelling moderate    Sensation Intact to light touch throughout sural, saphenous, superficial peroneal, deep peroneal and medial/lateral plantar nerve distributions.  [place]-Tc 5.07 filament (10g) testing deferred.    Cardiovascular Brisk capillary refill < 2 seconds,intact DP and PT pulses    Special Tests None      Imaging Studies:   6 views of the right ankle were taken, reviewed and interpreted independently that demonstrate hardware in expected position without signs of failure or loosening. Reviewed by me personally.      Scribe Attestation      I,:  Siomara Layton PA-C am acting as a scribe while in the presence of the attending physician.:       I,:  James R Lachman, MD personally performed the services described in this documentation    as scribed in my presence.:                James R. Lachman, MD  Foot & Ankle Surgery   Department of Orthopaedic Surgery  [facility]      I personally performed the service.    James R. Lachman, MD

## 2025-04-07 ENCOUNTER — PATIENT MESSAGE (OUTPATIENT)
Dept: FAMILY MEDICINE CLINIC | Facility: CLINIC | Age: 70
End: 2025-04-07

## 2025-04-08 DIAGNOSIS — F33.1 MDD (MAJOR DEPRESSIVE DISORDER), RECURRENT EPISODE, MODERATE (HCC): ICD-10-CM

## 2025-04-09 RX ORDER — ESCITALOPRAM OXALATE 20 MG/1
20 TABLET ORAL DAILY
Qty: 90 TABLET | Refills: 1 | Status: SHIPPED | OUTPATIENT
Start: 2025-04-09

## 2025-04-18 ENCOUNTER — RA CDI HCC (OUTPATIENT)
Dept: OTHER | Facility: HOSPITAL | Age: 70
End: 2025-04-18

## 2025-04-21 ENCOUNTER — PATIENT MESSAGE (OUTPATIENT)
Dept: FAMILY MEDICINE CLINIC | Facility: CLINIC | Age: 70
End: 2025-04-21

## 2025-04-21 DIAGNOSIS — F33.1 MDD (MAJOR DEPRESSIVE DISORDER), RECURRENT EPISODE, MODERATE (HCC): Primary | ICD-10-CM

## 2025-04-23 ENCOUNTER — APPOINTMENT (OUTPATIENT)
Age: 70
End: 2025-04-23
Payer: MEDICARE

## 2025-04-23 ENCOUNTER — RESULTS FOLLOW-UP (OUTPATIENT)
Dept: FAMILY MEDICINE CLINIC | Facility: CLINIC | Age: 70
End: 2025-04-23

## 2025-04-23 DIAGNOSIS — E78.00 PURE HYPERCHOLESTEROLEMIA: ICD-10-CM

## 2025-04-23 DIAGNOSIS — E55.9 VITAMIN D DEFICIENCY: ICD-10-CM

## 2025-04-23 DIAGNOSIS — R73.01 IFG (IMPAIRED FASTING GLUCOSE): Chronic | ICD-10-CM

## 2025-04-23 LAB
25(OH)D3 SERPL-MCNC: 45.2 NG/ML (ref 30–100)
ALBUMIN SERPL BCG-MCNC: 4 G/DL (ref 3.5–5)
ALP SERPL-CCNC: 89 U/L (ref 34–104)
ALT SERPL W P-5'-P-CCNC: 18 U/L (ref 7–52)
ANION GAP SERPL CALCULATED.3IONS-SCNC: 9 MMOL/L (ref 4–13)
AST SERPL W P-5'-P-CCNC: 20 U/L (ref 13–39)
BILIRUB SERPL-MCNC: 0.28 MG/DL (ref 0.2–1)
BUN SERPL-MCNC: 26 MG/DL (ref 5–25)
CALCIUM SERPL-MCNC: 8.7 MG/DL (ref 8.4–10.2)
CHLORIDE SERPL-SCNC: 107 MMOL/L (ref 96–108)
CHOLEST SERPL-MCNC: 173 MG/DL (ref ?–200)
CO2 SERPL-SCNC: 26 MMOL/L (ref 21–32)
CREAT SERPL-MCNC: 0.75 MG/DL (ref 0.6–1.3)
EST. AVERAGE GLUCOSE BLD GHB EST-MCNC: 114 MG/DL
GFR SERPL CREATININE-BSD FRML MDRD: 92 ML/MIN/1.73SQ M
GLUCOSE P FAST SERPL-MCNC: 94 MG/DL (ref 65–99)
HBA1C MFR BLD: 5.6 %
HDLC SERPL-MCNC: 53 MG/DL
LDLC SERPL CALC-MCNC: 98 MG/DL (ref 0–100)
NONHDLC SERPL-MCNC: 120 MG/DL
POTASSIUM SERPL-SCNC: 4.5 MMOL/L (ref 3.5–5.3)
PROT SERPL-MCNC: 6.9 G/DL (ref 6.4–8.4)
SODIUM SERPL-SCNC: 142 MMOL/L (ref 135–147)
TRIGL SERPL-MCNC: 112 MG/DL (ref ?–150)

## 2025-04-23 PROCEDURE — 36415 COLL VENOUS BLD VENIPUNCTURE: CPT

## 2025-04-23 PROCEDURE — 80061 LIPID PANEL: CPT

## 2025-04-23 PROCEDURE — 83036 HEMOGLOBIN GLYCOSYLATED A1C: CPT

## 2025-04-23 PROCEDURE — 80053 COMPREHEN METABOLIC PANEL: CPT

## 2025-04-23 PROCEDURE — 82306 VITAMIN D 25 HYDROXY: CPT

## 2025-04-23 PROCEDURE — 84443 ASSAY THYROID STIM HORMONE: CPT | Performed by: ORTHOPAEDIC SURGERY

## 2025-04-25 ENCOUNTER — APPOINTMENT (OUTPATIENT)
Dept: RADIOLOGY | Facility: CLINIC | Age: 70
End: 2025-04-25
Attending: ORTHOPAEDIC SURGERY
Payer: MEDICARE

## 2025-04-25 ENCOUNTER — OFFICE VISIT (OUTPATIENT)
Dept: FAMILY MEDICINE CLINIC | Facility: CLINIC | Age: 70
End: 2025-04-25
Payer: MEDICARE

## 2025-04-25 ENCOUNTER — OFFICE VISIT (OUTPATIENT)
Dept: OBGYN CLINIC | Facility: CLINIC | Age: 70
End: 2025-04-25
Payer: MEDICARE

## 2025-04-25 VITALS
DIASTOLIC BLOOD PRESSURE: 80 MMHG | TEMPERATURE: 98 F | WEIGHT: 304 LBS | HEIGHT: 70 IN | HEART RATE: 98 BPM | OXYGEN SATURATION: 96 % | BODY MASS INDEX: 43.52 KG/M2 | SYSTOLIC BLOOD PRESSURE: 120 MMHG | RESPIRATION RATE: 16 BRPM

## 2025-04-25 VITALS — WEIGHT: 297 LBS | BODY MASS INDEX: 42.52 KG/M2 | HEIGHT: 70 IN

## 2025-04-25 DIAGNOSIS — Z01.89 ENCOUNTER FOR LOWER EXTREMITY COMPARISON IMAGING STUDY: ICD-10-CM

## 2025-04-25 DIAGNOSIS — E78.2 MIXED HYPERLIPIDEMIA: ICD-10-CM

## 2025-04-25 DIAGNOSIS — E66.813 CLASS 3 SEVERE OBESITY DUE TO EXCESS CALORIES WITH SERIOUS COMORBIDITY AND BODY MASS INDEX (BMI) OF 40.0 TO 44.9 IN ADULT: ICD-10-CM

## 2025-04-25 DIAGNOSIS — M19.171 POST-TRAUMATIC ARTHRITIS OF RIGHT ANKLE: Primary | ICD-10-CM

## 2025-04-25 DIAGNOSIS — M19.171 POST-TRAUMATIC ARTHRITIS OF RIGHT ANKLE: ICD-10-CM

## 2025-04-25 DIAGNOSIS — G47.33 OSA ON CPAP: Chronic | ICD-10-CM

## 2025-04-25 DIAGNOSIS — R53.83 OTHER FATIGUE: Primary | ICD-10-CM

## 2025-04-25 DIAGNOSIS — F33.1 MDD (MAJOR DEPRESSIVE DISORDER), RECURRENT EPISODE, MODERATE (HCC): ICD-10-CM

## 2025-04-25 DIAGNOSIS — R63.5 WEIGHT GAIN: ICD-10-CM

## 2025-04-25 DIAGNOSIS — R53.83 OTHER FATIGUE: ICD-10-CM

## 2025-04-25 DIAGNOSIS — L30.4 INTERTRIGO: ICD-10-CM

## 2025-04-25 LAB — TSH SERPL DL<=0.05 MIU/L-ACNC: 1.05 UIU/ML (ref 0.45–4.5)

## 2025-04-25 PROCEDURE — G2211 COMPLEX E/M VISIT ADD ON: HCPCS | Performed by: FAMILY MEDICINE

## 2025-04-25 PROCEDURE — 73610 X-RAY EXAM OF ANKLE: CPT

## 2025-04-25 PROCEDURE — 73600 X-RAY EXAM OF ANKLE: CPT

## 2025-04-25 PROCEDURE — 99213 OFFICE O/P EST LOW 20 MIN: CPT | Performed by: ORTHOPAEDIC SURGERY

## 2025-04-25 PROCEDURE — 99215 OFFICE O/P EST HI 40 MIN: CPT | Performed by: FAMILY MEDICINE

## 2025-04-25 RX ORDER — NYSTATIN 100000 [USP'U]/G
POWDER TOPICAL 2 TIMES DAILY PRN
Qty: 60 G | Refills: 2 | Status: SHIPPED | OUTPATIENT
Start: 2025-04-25

## 2025-04-25 RX ORDER — DIPHENOXYLATE HYDROCHLORIDE AND ATROPINE SULFATE 2.5; .025 MG/1; MG/1
1 TABLET ORAL DAILY
COMMUNITY

## 2025-04-25 RX ORDER — FLUOXETINE 20 MG/1
20 TABLET, FILM COATED ORAL DAILY
Qty: 90 TABLET | Refills: 3 | Status: SHIPPED | OUTPATIENT
Start: 2025-04-25 | End: 2025-04-30

## 2025-04-25 RX ORDER — NYSTATIN 100000 U/G
CREAM TOPICAL 2 TIMES DAILY PRN
Qty: 60 G | Refills: 2 | Status: SHIPPED | OUTPATIENT
Start: 2025-04-25

## 2025-04-25 RX ORDER — ATORVASTATIN CALCIUM 10 MG/1
10 TABLET, FILM COATED ORAL DAILY
Qty: 90 TABLET | Refills: 2 | Status: SHIPPED | OUTPATIENT
Start: 2025-04-25

## 2025-04-25 NOTE — PROGRESS NOTES
Assessment & Plan  Other fatigue  Differential diagnosis reviewed   Suspect secondary to depression not being well controlled   Orders:  •  TSH, 3rd generation; Future    Weight gain    Recommend Zepbound (could also consider Wegovy and Saxenda) to help with GILLIAN and obesity along with coronary risk reduction.     Pt has not had success with weight loss with healthy diet and exercise alone, but will continue to work on these.     Patient is to call their insurance to see if this is covered, and then call pharmacies to find the medication.  When he does this we can submit the medication for prior authorization.  Follow up 3 months after starting GLP.    Patient has not been on any weight loss medicines in the past 12 months  This is a new start - he is not on another GLP medication currently or in the past 12 months   No personal or family history of thyroid cancer  No personal history of pancreatitis  A GLP medication is recommended for improvement in weight and comorbidities associated with obesity  - see problem list.  Risks and benefits and side effects of medication reviewed  Body mass index is 43.62 kg/m².  Wt Readings from Last 3 Encounters:   04/25/25 (!) 138 kg (304 lb)   04/25/25 135 kg (297 lb)   01/24/25 135 kg (297 lb)     BMI Readings from Last 3 Encounters:   04/25/25 43.62 kg/m²   04/25/25 42.62 kg/m²   01/24/25 42.62 kg/m²       Orders:  •  TSH, 3rd generation; Future    MDD (major depressive disorder), recurrent episode, moderate (HCC)  Depression Screening Follow-up Plan: Patient's depression screening was positive with a PHQ-9 score of 7. Patient advised to follow-up with PCP for further management.    Wean off lexapro and onto Prozac   Tried and failed wellbutrin in the past   If not tolerating Prozac will do Gene Sight testing   Follow up in 6 weeks or so   Orders:  •  FLUoxetine 20 MG tablet; Take 1 tablet (20 mg total) by mouth daily    Class 3 severe obesity due to excess calories with  serious comorbidity and body mass index (BMI) of 40.0 to 44.9 in adult  Recommend Zepbound (could also consider Wegovy and Saxenda) to help with GILLIAN and obesity along with coronary risk reduction.     Pt has not had success with weight loss with healthy diet and exercise alone, but will continue to work on these.     Patient is to call their insurance to see if this is covered, and then call pharmacies to find the medication.  When he does this we can submit the medication for prior authorization.  Follow up 3 months after starting GLP.    Patient has not been on any weight loss medicines in the past 12 months  This is a new start - he is not on another GLP medication currently or in the past 12 months   No personal or family history of thyroid cancer  No personal history of pancreatitis  A GLP medication is recommended for improvement in weight and comorbidities associated with obesity  - see problem list.  Risks and benefits and side effects of medication reviewed       Intertrigo  Not well controlled  Start nystatin  Call if persists or worsens.   Orders:  •  nystatin (MYCOSTATIN) cream; Apply topically 2 (two) times a day as needed (rash)  •  nystatin (MYCOSTATIN) powder; Apply topically 2 (two) times a day as needed (rash)    Mixed hyperlipidemia  Not well controlled  ASCVD risk 15.9%  Pt agrees to start a statin   Repeat labs in about 3 months   Orders:  •  atorvastatin (LIPITOR) 10 mg tablet; Take 1 tablet (10 mg total) by mouth daily    GILLIAN on CPAP    Recommend Zepbound (could also consider Wegovy and Saxenda) to help with GILLIAN and obesity along with coronary risk reduction.     Pt has not had success with weight loss with healthy diet and exercise alone, but will continue to work on these.     Patient is to call their insurance to see if this is covered, and then call pharmacies to find the medication.  When he does this we can submit the medication for prior authorization.  Follow up 3 months after starting  GLP.    Patient has not been on any weight loss medicines in the past 12 months  This is a new start - he is not on another GLP medication currently or in the past 12 months   No personal or family history of thyroid cancer  No personal history of pancreatitis  A GLP medication is recommended for improvement in weight and comorbidities associated with obesity  - see problem list.  Risks and benefits and side effects of medication reviewed        5 minutes spent on chart prep, 45 minutes spent with patient counseling/educating on their diagnoses, tests completed and any new tests ordered, any referrals placed, treatment options, and documentation of above today.   In prescribing new medications, or changing doses, we reviewed the risks and benefits and side effects of these medications along with other treatment options if appropriate.       Return for 6 weeks virtual mood, 4 months weight check.    Subjective:   Gavino is a 69 y.o. male here today for a follow-up on his current medical conditions:    Patient Active Problem List   Diagnosis   • Allergic rhinitis   • Attention deficit disorder (ADD) without hyperactivity   • Carpal tunnel syndrome   • MDD (major depressive disorder), recurrent episode, moderate (HCC)   • Diverticulosis   • Gastro-esophageal reflux disease without esophagitis   • History of knee replacement   • IFG (impaired fasting glucose)   • Class 3 severe obesity due to excess calories with serious comorbidity and body mass index (BMI) of 40.0 to 44.9 in adult   • Osteoarthritis of knee   • Pure hypercholesterolemia   • Polyp of colon   • GILLIAN on CPAP   • Vitamin D deficiency   • Post-traumatic arthritis of right ankle   • Hindfoot varus, acquired, right   • Acquired hindfoot varus, right         Patient Care Team:  Sera Richter DO as PCP - General (Family Medicine)  Gina Roberts MD (Gastroenterology)  Bryce Rojas DO (Gastroenterology)    Current Medications:  Current Outpatient  Medications   Medication Sig Dispense Refill   • aspirin (ECOTRIN LOW STRENGTH) 81 mg EC tablet Take 1 tablet (81 mg total) by mouth 2 (two) times a day 84 tablet 0   • atorvastatin (LIPITOR) 10 mg tablet Take 1 tablet (10 mg total) by mouth daily 90 tablet 2   • Calcium Carbonate-Vit D-Min (CALCIUM 1200 PO) Take by mouth     • cetirizine (ZyrTEC Allergy) 10 mg tablet Take 10 mg by mouth daily     • Cholecalciferol (Vitamin D) 50 MCG (2000 UT) CAPS Take 4,000 Units by mouth daily     • escitalopram (LEXAPRO) 20 mg tablet TAKE 1 TABLET BY MOUTH EVERY DAY 90 tablet 1   • FLUoxetine 20 MG tablet Take 1 tablet (20 mg total) by mouth daily 90 tablet 3   • multivitamin (THERAGRAN) TABS Take 1 tablet by mouth daily     • nystatin (MYCOSTATIN) cream Apply topically 2 (two) times a day as needed (rash) 60 g 2   • nystatin (MYCOSTATIN) powder Apply topically 2 (two) times a day as needed (rash) 60 g 2   • omeprazole (PriLOSEC) 20 mg delayed release capsule Take 20 mg by mouth every other day       No current facility-administered medications for this visit.       HPI:  Chief Complaint   Patient presents with   • weight gain and general health     -- Above per clinical staff and reviewed. --    PHQ-2/9 Depression Screening    Little interest or pleasure in doing things: 1 - several days  Feeling down, depressed, or hopeless: 1 - several days  Trouble falling or staying asleep, or sleeping too much: 0 - not at all  Feeling tired or having little energy: 1 - several days  Poor appetite or overeatin - more than half the days  Feeling bad about yourself - or that you are a failure or have let yourself or your family down: 1 - several days  Trouble concentrating on things, such as reading the newspaper or watching television: 1 - several days  Moving or speaking so slowly that other people could have noticed. Or the opposite - being so fidgety or restless that you have been moving around a lot more than usual: 0 - not at  "all  Thoughts that you would be better off dead, or of hurting yourself in some way: 0 - not at all  PHQ-9 Score: 7  PHQ-9 Interpretation: Mild depression            [facility] due  10/25/25  - 4/23/2025 blood work cmp normal, lipids 177 to 173, , HDL 61 to 53,  to 98 , vit D 45.2, HbA1C 5.7 to 5.6   Oct 2024 labs chol HDL 61, , PSA 0.624, vit D 44, CMP normal , HbA1C 5.7   M[facility]     Recurrent sinusisits   GILLIAN sleep medicine main line pulm [facility]     Today:  Groin rash started a month ago   Rash on buttock years ago   Was told dermatitis calmoseptine   Feels rough and some bleeding   Sometimes upper area bleeds also   Bowels softer once a day   Worked with dietician   Was on wellbutrin in the past - caused sweating.   Was on thyroid medication in his 20s     The following portions of the patient's history were reviewed and updated as appropriate: allergies, current medications, past family history, past medical history, past social history, past surgical history and problem list.    Objective:  Vitals:  /80 (BP Location: Left arm, Patient Position: Sitting, Cuff Size: Large)   Pulse 98   Temp 98 °F (36.7 °C) (Tympanic)   Resp 16   Ht 5' 10\" (1.778 m)   Wt (!) 138 kg (304 lb)   SpO2 96%   BMI 43.62 kg/m²    Wt Readings from Last 3 Encounters:   04/25/25 (!) 138 kg (304 lb)   04/25/25 135 kg (297 lb)   01/24/25 135 kg (297 lb)      BP Readings from Last 3 Encounters:   04/25/25 120/80   10/28/24 108/61   10/25/24 104/68        Review of Systems   He has no other concerns. + unexpected weight changes. No chest pain, SOB, or palpitations. No GERD. No changes in bowels or bladder. Sleeping well. + mood changes.     Physical Exam   Constitutional:  he appears well-developed and well-nourished.  HENT: Head: Normocephalic.   Neck: Neck supple.   Cardiovascular: Normal rate, regular rhythm and normal heart sounds.   Pulmonary/Chest: Effort normal and breath sounds normal. No wheezes, rales, or rhonchi. "   Abdominal: Soft. Bowel sounds are normal. There is no tenderness. No hepatosplenomegaly.   Musculoskeletal: he exhibits no edema.   Lymphadenopathy: he has no cervical adenopathy.   Neurological: he is alert and oriented to person, place, and time.   Skin: Skin is warm and dry.   Psychiatric: he has a normal mood and affect. his behavior is normal. Thought content normal.

## 2025-04-25 NOTE — ASSESSMENT & PLAN NOTE
Recommend Zepbound (could also consider Wegovy and Saxenda) to help with GILLIAN and obesity along with coronary risk reduction.     Pt has not had success with weight loss with healthy diet and exercise alone, but will continue to work on these.     Patient is to call their insurance to see if this is covered, and then call pharmacies to find the medication.  When he does this we can submit the medication for prior authorization.  Follow up 3 months after starting GLP.    Patient has not been on any weight loss medicines in the past 12 months  This is a new start - he is not on another GLP medication currently or in the past 12 months   No personal or family history of thyroid cancer  No personal history of pancreatitis  A GLP medication is recommended for improvement in weight and comorbidities associated with obesity  - see problem list.  Risks and benefits and side effects of medication reviewed

## 2025-04-25 NOTE — PATIENT INSTRUCTIONS
Lexapro 1/2 tablet every other day, alternating with Prozac 1/2 tablet every other day for 14 days, then Prozac 20 mg daily.     Weight Loss Medications/GLPs     Saxenda, Wegovy, Zepbound are FDA approved for weight loss with a prior authorization. Call to see if these are approved by your insurance.     [x] Zepbound is also covered for moderate to severe obstructive sleep apnea and obesity.      We have the additional options of Ozempic or Mounjaro if you have type 2 diabetes only.     Some of these medications can also be used to lower the chances of a heart attack or stroke. These medications include liraglutide (Victoza), semaglutide (Ozempic*, Wegovy) subcutaneously, and dulaglutide (Trulicity).  They may be covered if you have a diagnosis of coronary artery disease/atherosclerosis, heart failure, peripheral vascular disease or have had a heart attack. These medications may also potentially prevent the progression of kidney disease.     Medicare does not pay for GLPs for weight loss. As of Aug 2024 Medicare Part D will cover Ozempic, Moujaro, Rybelsus and Wegovy for for the treatment of type 2 diabetes and cardiovascular disease. Some of these may be covered for cardiovascular disease with obesity.  This does not mean that all of these medications will be in your formulary, and they may require prior authorization, may be a high tier drug or you may need to pay a large percetage of the cost due to coinsurance.         [x] How to start the process: please read carefully   Call to see if one of the above medications are covered by your insurance.  Next call around to see if any pharmacies have the medication in stock. We cannot fill it until you know they have it in stock.   Next send us a [employer] message to let us know which one so we can send it in. The medication will need to be increased either weekly (for Saxenda) or monthly (for the others), so we cannot send it to a mail away pharmacy until you get to the  maintenance dose.   Once  the prescription gets sent in we will need to get a prior auth from the insurance company. Allow 1 - 2 weeks for this process. If you have not hear anything regarding approval or denial from our office by this time, please reach out to us.    [x] For refills:  Once you start the medication you will need to contact our office 2 weeks before your due for the medication for a refill. Let us know how you are doing on the medication (losing weight, no side effects), if you want the same dose or the next dose, what pharmacy you want it sent to, and if you need a one month or a 3 month supply for a mail away plan.   You can call or send a [employer] message. Do not send a Medication Refill message, or have the pharmacy contact us. Be sure to give enough time as your insurance may require prior auth for every new dose (this can take up to 21 days).   We will need to see you about every 3 months to check on how this is working and to communicate with your insurance company that you are doing well. Please keep these scheduled appointments. You can expect about a 2% weight loss per month in the first 6 months. You should see at least a 5% weight loss in the first 12 weeks of use. By 6 months it may be up to 10%.     Medications:    [x] Zepbound (tirzepatide)  Month 1:  2.5 mg subQ once weekly for 4 weeks  Month 2:  *5 mg weekly x 4 weeks    Month 3:  7.5 mg weekly x 4 weeks   Month 4:  *10 mg weekly x 4 weeks   Month 5: 12.5 mg weekly x 4 weeks   Month 6:  *15 mg weekly (max dose/maintenance dose)   *maintenance doses - can stay on these long term     How to give it:  Inject subQ into the thigh, abdomen, or upper arm; rotate injection sites 2,1.  Administer at any time of day, with or without meals 2,1.  Administer separately from insulin (do not mix the products); may inject both medications in the same body region but not adjacent to each other 2.  The day of the weekly administration can be changed as  long as the time between the 2 doses is at least 3 days (72 hours) 2,1.  Administer a missed dose as soon as possible within 4 days (96 hours) of missed dose; if more than 4 days have passed, skip the missed dose and administer next dose on regularly scheduled day and resume regular once weekly dosing schedule  Adults only    Call if:  You feel fullness or pain in the thyroid area (front and middle of your neck)    Bad upper abdominal pain that is not going away.   Nausea or vomiting that persists.  Any changes in vision 2.  You have worsening depression, suicidal ideation, or unusual changes in behavior 1.    Other precautions:  It is advised if you use an oral hormonal contraceptive to switch to a non-oral contraceptive method, or add a barrier method of contraception for 4 weeks after initiation and for 4 weeks after each dose escalation 2.  Side effects may include nausea, diarrhea, decreased appetite, vomiting, constipation, dyspepsia, and abdominal pain 2.  Take precautions to avoid dehydration 2.  If you have diabetes monitor for symptoms of hypoglycemia and report difficulties with glycemic control 1.  If you miss a dose administer a missed dose as soon as possible within 4 days. If more than 4 days have passed, skip the missed dose, administer the next dose on the regularly scheduled day, and resume the regular once-weekly dosing schedule 2.    [] Wegovy (semaglutide)  Wegovy dosing:  Week 1 - 4:  0.25 mg weekly   Week 5 - 8:  0.5 mg weekly   Week 9 - 12:  1 mg weekly   Week 13 - 16:  *1.7 mg weekly   Maintenance from there:  *2.4 mg weekly.   *maintenance doses - can stay on these long term    Wegovy is a WEEKLY non-insulin injectable. GLP-1Agonist  Semaglutide (Wegovy) FDA approved in 2014 as adjunct to reduced calorie diet and increased physical activity for chronic weight management in adults with initial BMI of >= 30 kg/m2 or >= 27 kg/m2 with >= 1 weight-related comorbid condition. There were several  studies run over 68 week periods of time.  During this time they diabetic patients lose on average 6.2% of their body weight. In a different trial involving non-diabetics the weight loss was closer to 12 - 15%. Age11+.          Action: Cause slower gastric emptying which will increase feeling full with meals.  This feeling fullness will allow for you to reduce calorie intake sooner than usual and begin to lose some weight.  These medications also will reduce production of glucose form your liver to allow for better control of your sugars.       Reminders: These medications do not cause low blood sugars.   Keep in the refrigerator until you use it once, then keep it out of refrigerator.  Clean your hands and site of injection to avoid infection risks. Store used needles in old plastic laundry detergent bin or coffee bin, then tape it when full and discard in garbage.      Side Effects:  Common side effects include full sensation with eating, nausea which usually improves over the first 1-2 weeks. Soreness, redness or lump at site of injection. Constipation, stomach pain, headache, fatigue, indigestion, dizziness, bloating, burping.      Dangers:  Pancreatitis can happen with this medicine for some people; therefore if you have ever had pancreatitis or have severe nausea vomiting and abdominal pain while on this medicine stop it immediately and call us or go to ER for assistance. Similarly gallbladder disease.  Do not use if you have had a history of thyroid canceror a family history of MEN syndrome.  It can cause low blood sugar if mixed with certain other diabetes medications.  If you have type 2 diabetes you should already be seeing an eye doctor - let them know you are on this medication and contact them if there are changes in your vision.  See the package insert for all serious possible side effects     [] Saxenda (liraglutide)  This is how to start Saxenda. It is a daily injection to help with weight loss.      WEEK 1: 0.6mg daily  -if tolerated,  WEEK 2: 1.2mg daily  -if tolerated,  WEEK 3: 1.8mg daily  -if tolerated,  WEEK 4: 2.4mg daily  -if tolerated,  WEEK 5: *3mg daily and then continue at this dose   *maintenance doses - can stay on these long term    If you miss more than 3 days of the medication you should restart this from the beginning.     VISIT [email or web address] to see about coupons, how to give yourself injections, and more information     If you develop nausea or vomiting, STOP the medication for a few days, then DECREASE to the previous dose that you tolerated well.   Stay well hydrated.  You can inject in your stomach, upper leg or upper arm   Unused pens should be stored in the refrigerator, but the pen in use can he kept at room temperature for up to 30 days   If you develop severe abdominal pain, pain radiating from your abdomen to the back, or fever associated with abdominal pain/vomiting, please call or go to the ER as this can be a sign of pancreatitis which can be an adverse effect of the medication.    Action: Cause slower gastric emptying which will increase feeling full with meals.  This feeling fullness will allow for you to reduce calorie intake sooner than usual and begin to lose some weight.  These medications also will reduce production of glucose form your liver to allow for better control of your sugars. Age 12+.   Reminders: These medications do not cause low blood sugars.   Keep in the refrigerator until you use it once, then keep it out of refrigerator.  You ONLY need to prime the pen upon open it monthly.  Clean your hands and site of injection to avoid infection risks. Store used needles in old plastic laundry detergent bin or coffee bin, then tape it when full and discard in garbage.   Side Effects:  Common side effects include full sensation with eating, nausea which usually improves over the first 1-2 weeks. Soreness, redness or lump at site of injection.  Dangers:  Pancreatitis can  happen with this medicine for some people; therefore if you have ever had pancreatitis or have severe nausea vomiting and abdominal pain while on this medicine stop it immediately and call us or go to ER for assistance. Do not use if you have had a history of thyroid cancer or a family history of MEN syndrome. If you are diabetic and use this medication with insulin or sulfonylureas there is a risk of low blood sugar. Monitor your sugars more closely.  If you are not able to urinate properly this could be a sign of a kidney problem. Let us know right away. In the clinical trials there were issues with gallbladder problems like gallstones. Let us know if you develop severe abdominal pain. If you have new depression or thoughts of suicide you should also contact us right away, and call 911 or go to your nearest emergency room.       Covered only if you have type 2 diabetes:    [] Mounjarno (tirzepatide)  dosing instructions  Month 1 -  2.5 mg weekly x 4 weeks   Month 2 - 5 mg weekly x 4 weeks  Month 3 - 7.5 mg weekly x 4 week   Month 4 - 10 mg weekly x 4 weeks  Month 5 - 12.5 mg weekly x 4 weeks   Week 6 and thereafter - 15 mg weekly     You can inject this in your stomach, thigh, or upper arm.   For savings card program go to [employer] or  9-207-BzkjoUm ([phone])  Common side effects  nausea, diarrhea, decreased appetite, vomiting, constipation, indigestion, and stomach pain.   To help these try eating smaller meals, stop eating when you feel full, avoid eating fat or fatty foods, try eating bland foods like toast, crackers or rice.   If you take birth control pills by mouth these may not work as well while you are on this medication and you may want to increase your dose or use another form of birth control.     Contraindicated with medullary thyroid carcinoma MTC or MEN2.   It may cause tumors of the thyroid, including thyroid cancer.   Call right away and stop medication if severe pain in stomach with or  without vomiting (pancreatitis)   Watch for low blood sugar if taking this medication with a sulfanuria or insulin.   Drink plenty of fluids to avoid dehydration.   If you having gallbladder issues such as pain in your upper abdomen, yellowing of your skin, fever, bijal colored stools, stop this medicine can all your doctor.   Call if you have sudden change in your vision.     [] Ozempic (semaglutide)  How to start Ozempic  Month 1:  0.25 mg weekly x 4 weeks   Month 2:  0.5 mg weekly x 4 weeks   Month 3:  1 mg weekly x 4 weeks   Month 4:  2 mg weekly thereafter     Go to the Ozempic or GoodRx website to look coupons to save on this medication.   About the Ozempic® Pen  Ozempic® (semaglutide) injection 0.5 mg or 1 mg   Ozempic PI (nghia-pi.[email or web address])     Ozempic is a  WEEKLY non-insulin injectable. GLP-1Agonist  Victoza (liraglutide) daily, Byetta (exenatide) twice daily, Adlyxin (lixisenatide)  Trulicity (dulaglutide) Bydureon(Exenatide) , Ozempic (semaglutide), Rybelsus (semaglutide),          Action: Cause slower gastric emptying which will increase feeling full with meals.  This feeling fullness will allow for you to reduce calorie intake sooner than usual and begin to lose some weight.  These medications also will reduce production of glucose form your liver to allow for better control of your sugars.       Reminders: These medications do not cause low blood sugars.   Keep in the refrigerator until you use it once, then keep it out of refrigerator.  Clean your hands and site of injection to avoid infection risks. Store used needles in old plastic laundry detergent bin or coffee bin, then tape it when full and discard in garbage.      Side Effects:  Common side effects include full sensation with eating, nausea which usually improves over the first 1-2 weeks. Soreness, redness or lump at site of injection.     Dangers:  Pancreatitis can happen with this medicine for some people; therefore if you have ever had  pancreatitis or have severe nausea vomiting and abdominal pain while on this medicine stop it immediately and call us or go to ER for assistance. Do not use if you have had a history of thyroid canceror a family history of MEN syndrome.

## 2025-04-25 NOTE — ASSESSMENT & PLAN NOTE
Depression Screening Follow-up Plan: Patient's depression screening was positive with a PHQ-9 score of 7. Patient advised to follow-up with PCP for further management.    Wean off lexapro and onto Prozac   Tried and failed wellbutrin in the past   If not tolerating Prozac will do Gene Sight testing   Follow up in 6 weeks or so   Orders:  •  FLUoxetine 20 MG tablet; Take 1 tablet (20 mg total) by mouth daily

## 2025-04-25 NOTE — PROGRESS NOTES
James R Lachman, M.D.  Attending, Orthopaedic Surgery  Foot and Ankle  [facility]      ORTHOPAEDIC FOOT AND ANKLE CLINIC VISIT     Assessment:     Encounter Diagnoses   Name Primary?    Post-traumatic arthritis of right ankle Yes    Encounter for lower extremity comparison imaging study          6 months out from right  [place] Total ankle replacement with deltoid release, modified erick Arita achilles lengthening    Date of surgery: 10/28/2024       Plan:     Assessment & Plan  Encounter for lower extremity comparison imaging study    Orders:    XR ankle 2 vw left; Future    Post-traumatic arthritis of right ankle  The patient verbalized understanding of exam findings and treatment plan. We engaged in the shared decision-making process and treatment options were discussed at length with the patient. Surgical and conservative management discussed today along with risks and benefits.  He is very happy with his total ankle replacement and is now walking pain free without a limp  Continue with proper shoe wear with orthotics if prescribed  Activity to tolerance.  Avoid impact activity  Xrays today demonstrate well aligned total ankle replacement without signs of hardware failure or loosening  Return in about  6 months for repeat evaluation  Orders:    XR ankle 3+ vw right; Future           History of Present Illness:   Chief Complaint:   Chief Complaint   Patient presents with    Right Ankle - Follow-up     Eduardo is a 69 y.o. male who is being seen in follow-up for right post traumatic ankle arthritis, now 6 months s/p TAA. When we last met we recommended proper shoe wear, gradually return to activity to tolerance.  Pain has 100% improved. No  residual pain present.      Pain/symptom timing:  Not longer painful during the day  Pain/symptom context:  Able to tolerate activities and work  Pain/symptom modifying factors:  Returning to activity  Pain/symptom associated signs/symptoms:  none    Prior treatment   NSAIDsYes   Injections No   Bracing/Orthotics Yes    Physical Therapy No     Orthopedic Surgical History:   See below    Past Medical, Surgical and Social History:  Past Medical History:    Past Medical History:   Diagnosis Date    Allergic 1962    Anxiety 1/1/1995    COVID-19 11/13/2023    Last Assessment & Plan:    Formatting of this note might be different from the original.   Patient tested positive for COVID. Has Paxlovid at home. Will take 3 tablets 2 times per day for 5 days. Quarantine for 5 days. Advised to call if not resolving or if worsening. Can use over-the-counter Mucinex-D and Robitussin    Depression 1/1/1995    GERD (gastroesophageal reflux disease)     Sleep apnea     CPAP     Past Surgical History:    Past Surgical History:   Procedure Laterality Date    BONE CYST EXCISION  1966    Right humerous    JOINT REPLACEMENT  4/1/2008    PA ARTHROPLASTY ANKLE W/IMPLANT Right 10/28/2024    Procedure: ARTHROPLASTY ANKLE, [place], Deltoid Release, Achilles lengthening;  Surgeon: James R Lachman, MD;  Location:  MAIN OR;  Service: Orthopedics    REPLACEMENT TOTAL KNEE BILATERAL  2020    Patient has had 3 replacements total. Last in 2020.    VASECTOMY  1990     Family History:   Family History   Problem Relation Age of Onset    Heart disease Mother     Hearing loss Mother     COPD Father     Glaucoma Paternal Grandmother     Alcohol abuse Sister     Depression Brother      Social History:    Social History     Socioeconomic History    Marital status: /Civil Union     Spouse name: Not on file    Number of children: Not on file    Years of education: Not on file    Highest education level: Not on file   Occupational History    Not on file   Tobacco Use    Smoking status: Never     Passive exposure: Never    Smokeless tobacco: Never   Vaping Use    Vaping status: Never Used   Substance and Sexual Activity    Alcohol use: Yes     Alcohol/week: 6.0 standard drinks of alcohol      Types: 3 Cans of beer, 3 Shots of liquor per week     Comment: Daily    Drug use: Never    Sexual activity: Not Currently     Partners: Female   Other Topics Concern    Not on file   Social History Narrative    Not on file     Social Drivers of Health     Financial Resource Strain: Low Risk  (10/23/2023)    Received from [employer]    Overall Financial Resource Strain (CARDIA)     Difficulty of Paying Living Expenses: Not hard at all   Food Insecurity: No Food Insecurity (10/18/2024)    Nursing - Inadequate Food Risk Classification     Worried About Running Out of Food in the Last Year: Never true     Ran Out of Food in the Last Year: Never true     Ran Out of Food in the Last Year: Not on file   Transportation Needs: No Transportation Needs (10/18/2024)    PRAPARE - Transportation     Lack of Transportation (Medical): No     Lack of Transportation (Non-Medical): No   Physical Activity: Sufficiently Active (10/23/2023)    [facility] [employer] [employer]    Exercise Vital Sign     Days of Exercise per Week: 7 days     Minutes of Exercise per Session: 30 min   Stress: Not on file   Social Connections: Not on file   Intimate Partner Violence: Not on file   Housing Stability: Low Risk  (10/18/2024)    Housing Stability Vital Sign     Unable to Pay for Housing in the Last Year: No     Number of Times Moved in the Last Year: 0     Homeless in the Last Year: No     Current Medications:   Current Outpatient Medications on File Prior to Visit   Medication Sig Dispense Refill    Calcium Carbonate-Vit D-Min (CALCIUM 1200 PO) Take by mouth      cetirizine (ZyrTEC Allergy) 10 mg tablet Take 10 mg by mouth daily      Cholecalciferol (Vitamin D) 50 MCG (2000 UT) CAPS Take 4,000 Units by mouth daily      escitalopram (LEXAPRO) 20 mg tablet TAKE 1 TABLET BY MOUTH EVERY DAY 90 tablet 1    omeprazole (PriLOSEC) 20 mg delayed release capsule Take 20 mg by mouth every other day      aspirin  "(ECOTRIN LOW STRENGTH) 81 mg EC tablet Take 1 tablet (81 mg total) by mouth 2 (two) times a day 84 tablet 0    B Complex CAPS Take by mouth      ondansetron (ZOFRAN) 4 mg tablet Take 1 tablet (4 mg total) by mouth every 8 (eight) hours as needed for nausea or vomiting 10 tablet 0    oxyCODONE (Roxicodone) 5 immediate release tablet Take 1 tablet (5 mg total) by mouth every 4 (four) hours as needed for severe pain for up to 30 doses Max Daily Amount: 30 mg 30 tablet 0     No current facility-administered medications on file prior to visit.     Allergies:   Allergies   Allergen Reactions    Cashew Nut Oil - Food Allergy Anaphylaxis    Mangifera Indica Anaphylaxis    Nuts - Food Allergy Anaphylaxis     Pistachio      Dust Mite Extract Nasal Congestion    Grass Pollen(K-O-R-T-Swt Bryan) Nasal Congestion    Other Sneezing    Pollen Extract Sneezing    Short Ragweed Pollen Ext Sneezing    Sunflower Oil - Food Allergy Swelling    Hydromorphone Nausea Only     Other reaction(s): Urticaria         Review of Systems:  General- denies fever/chills  HEENT- denies hearing loss or sore throat  Eyes- denies eye pain or visual disturbances, denies red eyes  Respiratory- denies cough or SOB  Cardio- denies chest pain or palpitations  GI- denies abdominal pain  Endocrine- denies urinary frequency  Urinary- denies pain with urination  Musculoskeletal- Negative except noted above  Skin- denies rashes or wounds  Neurological- denies dizziness or headache  Psychiatric- denies anxiety or difficulty concentrating    Physical Exam:   Ht 5' 10\" (1.778 m)   Wt 135 kg (297 lb)   BMI 42.62 kg/m²   General/Constitutional: No apparent distress: well-nourished and well developed.  Eyes: normal ocular motion  Lymphatic: No appreciable lymphadenopathy  Respiratory: Non-labored breathing  Vascular: No edema, swelling or tenderness, except as noted in detailed exam.  Integumentary: No impressive skin lesions present, except as noted in detailed " exam.  Neuro: No ataxia or tremors noted  Psych: Normal mood and affect, oriented to person, place and time. Appropriate affect.  Musculoskeletal: Normal, except as noted in detailed exam and in HPI.    Examination    right    Gait Normal   Musculoskeletal  No ttp    Skin Mild swelling ankle Well-healed incisions.    Nails Normal    Range of Motion  20 degrees dorsiflexion, 30 degrees plantarflexion  Subtalar motion: 10 degrees eversion, 10 degrees inversion    Stability Stable    Muscle Strength 5/5 tibialis anterior  5/5 gastrocnemius-soleus  5/5 posterior tibialis  5/5 peroneal/eversion strength  5/5 EHL  5/5 FHL    Neurologic Normal    Sensation Intact to light touch throughout sural, saphenous, superficial peroneal, deep peroneal and medial/lateral plantar nerve distributions.  Garland-Tc 5.07 filament (10g) testing deferred.    Cardiovascular Brisk capillary refill < 2 seconds,intact DP and PT pulses    Special Tests None      Imaging Studies:   6 views of the right ankle were obtained, reviewed and interpreted independently which demonstrate hardware intact and aligned, no sign of loosening. No cyst formation, no stress fractures noted, minimal heterotopic ossification. Reviewed by me personally.    Scribe Attestation      I,:  Wali Schwab am acting as a scribe while in the presence of the attending physician.:       I,:  James R Lachman, MD personally performed the services described in this documentation    as scribed in my presence.:             James R. Lachman, MD  Foot & Ankle Surgery   Department of Orthopaedic Surgery  [facility]      I personally performed the service.    James R. Lachman, MD

## 2025-04-25 NOTE — ASSESSMENT & PLAN NOTE
The patient verbalized understanding of exam findings and treatment plan. We engaged in the shared decision-making process and treatment options were discussed at length with the patient. Surgical and conservative management discussed today along with risks and benefits.  He is very happy with his total ankle replacement and is now walking pain free without a limp  Continue with proper shoe wear with orthotics if prescribed  Activity to tolerance.  Avoid impact activity  Xrays today demonstrate well aligned total ankle replacement without signs of hardware failure or loosening  Return in about  6 months for repeat evaluation  Orders:    XR ankle 3+ vw right; Future

## 2025-04-25 NOTE — PATIENT INSTRUCTIONS
Total Ankle Replacement Care    -No impact activity (No running, no jumping, no mogul skiing, etc.)    -Wear orthotics in all shoes at all times if prescribed. If no orthotics necessary, supportive shoes at all times.*    -Call our office for a prescription for antibiotics prior to any dental work/procedure    -Keep your follow-up visits.  Total ankle replacements need to be monitored at regular intervals to catch any potential issues early.    -Use compression stockings as needed to help control swelling    -Do not ignore sudden onset redness or swelling in the ankle. Come in to the clinic for evaluation right away if you have sudden onset redness or swelling in the ankle different from your typical swelling        [employer], [employer], [employer] are good brands but I recommend going to a dedicate shoe store (not [employer] or [employer].) At these types of stores, they have experts that can fit you for shoes appropriate for your foot problem. Shoe choice is essential to solving/improving most types of foot pain.  Even after a surgery, good shoes are necessary to keep the foot as comfortable as possible.    [employer]  [address] [facility] [address]  [phone]    [employer], [place] [address]  [phone]    [employer]  [address] [place] [address] [phone]    [employer]   [address] [facility]  [phone]    [employer]  [address], [place] [address]  [phone]    [employer]  [address] [place] [address]  [phone]    [facility] [employer]   [address] [place] [address]  [phone]    [employer]  [address] [place], [place]  [phone]    [employer] [address], [address]  [phone]    [facility] [address], [address], [place] [address] [phone]

## 2025-04-26 ENCOUNTER — RESULTS FOLLOW-UP (OUTPATIENT)
Dept: FAMILY MEDICINE CLINIC | Facility: CLINIC | Age: 70
End: 2025-04-26

## 2025-04-30 DIAGNOSIS — F33.1 MDD (MAJOR DEPRESSIVE DISORDER), RECURRENT EPISODE, MODERATE (HCC): ICD-10-CM

## 2025-04-30 RX ORDER — FLUOXETINE 10 MG/1
10 CAPSULE ORAL DAILY
Qty: 14 CAPSULE | Refills: 0 | Status: SHIPPED | OUTPATIENT
Start: 2025-04-30 | End: 2025-05-14

## 2025-05-01 ENCOUNTER — TELEPHONE (OUTPATIENT)
Age: 70
End: 2025-05-01

## 2025-05-01 DIAGNOSIS — G47.33 OSA ON CPAP: Primary | Chronic | ICD-10-CM

## 2025-05-01 RX ORDER — FLUOXETINE 20 MG/1
20 TABLET, FILM COATED ORAL DAILY
Qty: 90 TABLET | Refills: 3 | OUTPATIENT
Start: 2025-05-01

## 2025-05-01 NOTE — LETTER
ATTN: Appeals Dept     Patient: Gavino Hall :1955 Member ID: [id number]    Re: Request for Reconsideration of Zepbound 2.5 MG/0.5ML       To Whom It May Concern:   Gavino Hall 1955 was denied coverage for the medication Zepbound 2.5 MG/0.5ML on 5/15/25. The denial reason was stated as not covered due to not meeting insurance criteria. I am requesting a redetermination of the denial of coverage due to patient was diagnosis with obstructive sleep apnea in  and has been using a CPAP to assist. Patient has not had success with weight loss with healthy diet and exercise alone, but will continue to work on these.       In conclusion, please reconsider the denial of Zepbound 2.5 MG/0.5ML for my patient. I would appreciate prompt review of this appeal and approval of this FDA-approved medication. I can be reached by phone at [phone] or via fax at [phone] for additional information and discussion. Thank you.      Sincerely,   Sera Richter DO

## 2025-05-01 NOTE — TELEPHONE ENCOUNTER
CASPER; patient was calling to advise Noel licona faxing over medical review ppwk to approve his zepbound.

## 2025-05-02 DIAGNOSIS — G47.33 OSA ON CPAP: Chronic | ICD-10-CM

## 2025-05-02 RX ORDER — TIRZEPATIDE 2.5 MG/.5ML
2.5 INJECTION, SOLUTION SUBCUTANEOUS WEEKLY
Qty: 2 ML | Refills: 0 | Status: SHIPPED | OUTPATIENT
Start: 2025-05-02 | End: 2025-05-15

## 2025-05-02 RX ORDER — TIRZEPATIDE 2.5 MG/.5ML
2.5 INJECTION, SOLUTION SUBCUTANEOUS WEEKLY
Refills: 0 | OUTPATIENT
Start: 2025-05-02 | End: 2025-05-30

## 2025-05-02 NOTE — TELEPHONE ENCOUNTER
[place] Zepbound 2.5 MG/0.5ML SUBMITTED    to [facility]     via    []CMM-KEY:    []Surescripts-Case ID #    []Availity-Auth ID #  NDC #     [x]Faxed to plan, [phone], case ID: [id number]  []Other website    []Phone call Case ID #      []PA sent as URGENT    All office notes, labs and other pertaining documents and studies sent. Clinical questions answered. Awaiting determination from insurance company.     Turnaround time for your insurance to make a decision on your Prior Authorization can take 7-21 business days.

## 2025-05-02 NOTE — TELEPHONE ENCOUNTER
He was supposed to let us know if they cover this for GILLIAN. I will send it now to [facility]. Will await prior auth.

## 2025-05-15 NOTE — TELEPHONE ENCOUNTER
[place] Zepbound 2.5 MG/0.5ML DENIED    Reason:(Screenshot if applicable)        Message sent to office clinical pool Yes    Denial letter scanned into Media Yes    We can gladly do an appeal but the process can take about 30-60 days to provide determination. Please have the office staff schedule a Peer to Peer at phone [phone]. If an appeal is truly warranted please have Provider send clinical documentation to the [place] department to support the appeal.     **Please follow up with your patient regarding denial and next steps**

## 2025-05-15 NOTE — TELEPHONE ENCOUNTER
The prior auth says they did not receive information that he is obese and has GILLIAN - resubmit prior auth

## 2025-05-19 NOTE — TELEPHONE ENCOUNTER
[place] Zepbound 2.5 MG/0.5ML APPEALED     []CMM  []SS  [x]Letter sent to insurance via fax [phone]  []Other site or means      All necessary records sent. Will await response from insurance company    Turnaround time for a decision to be made on an appeal could take up to 30 business days

## 2025-05-20 NOTE — TELEPHONE ENCOUNTER
[place] Appeal Zepbound 2.5 MG/0.5ML DENIED    Reason:(Screenshot if applicable)        Message sent to office clinical pool Yes    Denial letter scanned into Media Yes

## 2025-05-21 ENCOUNTER — PATIENT MESSAGE (OUTPATIENT)
Dept: FAMILY MEDICINE CLINIC | Facility: CLINIC | Age: 70
End: 2025-05-21

## 2025-05-22 NOTE — TELEPHONE ENCOUNTER
Called pt and relayed pcp message - provided pt with the back fax #. Pt had no further questions but let us know that he paid out of pocket through [employer] and took his first dose yesterday.

## 2025-05-22 NOTE — TELEPHONE ENCOUNTER
"Please advise him the GLP is not covered since we do not have proof of moderate sleep apnea.     - please advise pt that I do not have access to his sleep study that showed the moderate to severe sleep apnea. He can contact his sleep medicine doctor and ask them to fax us this study if he wishes. In order for the medicine to be covered it has to show a apnea-hyponea index > 15. (His sleep provider will know what this means)     Note in chart 3/17/22 \"He was diagnosed in 2007 with moderate to severe obstructive sleep apnea with nocturnal hypoxemia. He was initiated on CPAP 10 cm H2O with a Respironics and a Paykel Wills nasal mask.\" - Vern Miranda MD   "

## 2025-05-22 NOTE — TELEPHONE ENCOUNTER
Pt's sleep study data results and office notes were faxed over - placed in the scan folder for clerical.

## 2025-05-23 NOTE — TELEPHONE ENCOUNTER
Please call his sleep specialist and ask to speak to his nurse. This doctor says he has moderate to severe sleep apnea, but this report does not show this. Ask if he has a report on Eduardo showing an AHI > 15? I think it would be before he started treatment.

## 2025-05-24 DIAGNOSIS — F33.1 MDD (MAJOR DEPRESSIVE DISORDER), RECURRENT EPISODE, MODERATE (HCC): ICD-10-CM

## 2025-06-03 ENCOUNTER — OFFICE VISIT (OUTPATIENT)
Age: 70
End: 2025-06-03
Payer: MEDICARE

## 2025-06-03 VITALS — BODY MASS INDEX: 43.62 KG/M2 | WEIGHT: 304 LBS

## 2025-06-03 DIAGNOSIS — E66.813 CLASS 3 SEVERE OBESITY DUE TO EXCESS CALORIES WITH SERIOUS COMORBIDITY AND BODY MASS INDEX (BMI) OF 40.0 TO 44.9 IN ADULT: ICD-10-CM

## 2025-06-03 DIAGNOSIS — G47.33 OSA ON CPAP: ICD-10-CM

## 2025-06-03 DIAGNOSIS — L57.0 ACTINIC KERATOSES: ICD-10-CM

## 2025-06-03 DIAGNOSIS — D18.01 CHERRY ANGIOMA: ICD-10-CM

## 2025-06-03 DIAGNOSIS — L82.1 SEBORRHEIC KERATOSIS: ICD-10-CM

## 2025-06-03 DIAGNOSIS — L81.4 LENTIGO: ICD-10-CM

## 2025-06-03 DIAGNOSIS — D22.9 MULTIPLE BENIGN MELANOCYTIC NEVI: Primary | ICD-10-CM

## 2025-06-03 PROCEDURE — 17000 DESTRUCT PREMALG LESION: CPT

## 2025-06-03 PROCEDURE — 99204 OFFICE O/P NEW MOD 45 MIN: CPT

## 2025-06-03 NOTE — PROGRESS NOTES
"[facility] Dermatology Clinic Note     Patient Name: Gavino Hall  Encounter Date: 6/3/25       Have you been cared for by a [facility] Dermatologist in the last 3 years and, if so, which description applies to you? NO. I am considered a \"new\" patient and must complete all patient intake questions. I am not of child-bearing potential.     REVIEW OF SYSTEMS:  Have you recently had or currently have any of the following? Recent fever or chills? No  Any non-healing wound? No   PAST MEDICAL HISTORY:  Have you personally ever had or currently have any of the following?  If \"YES,\" then please provide more detail. Skin cancer (such as Melanoma, Basal Cell Carcinoma, Squamous Cell Carcinoma?  No  Tuberculosis, HIV/AIDS, Hepatitis B or C: No  Radiation Treatment No   HISTORY OF IMMUNOSUPPRESSION:   Do you have a history of any of the following:  Systemic Immunosuppression such as Diabetes, Biologic or Immunotherapy, Chemotherapy, Organ Transplantation, Bone Marrow Transplantation or Prednisone?  No     Answering \"YES\" requires the addition of the dotphrase \"IMMUNOSUPPRESSED\" as the first diagnosis of the patient's visit.   FAMILY HISTORY:  Any \"first degree relatives\" (parent, brother, sister, or child) with the following?    Skin Cancer, Pancreatic or Other Cancer? YES, maternal grandmother melanoma   PATIENT EXPERIENCE:    Do you want the Dermatologist to perform a COMPLETE skin exam today including a clinical examination under the \"bra and underwear\" areas?  Yes  If necessary, do we have your permission to call and leave a detailed message on your Preferred Phone number that includes your specific medical information?  Yes      Allergies[1] Current Medications[2]              Whom besides the patient is providing clinical information about today's encounter?   NO ADDITIONAL HISTORIAN (patient alone provided history)    Physical Exam and Assessment/Plan by Diagnosis:  MELANOCYTIC NEVI (\"Moles\")  Physical " "Exam:  Anatomic Location Affected:   Mostly on sun-exposed areas of the trunk and extremities   Morphological Description:  Scattered, 1-4mm round to ovoid, symmetrical-appearing, even bordered, skin colored to dark brown macules/papules      Additional History of Present Condition:  Present on exam. Denies any pain, itch, bleeding. Present for years. Denies actively changing or growing moles.     Assessment and Plan:  Based on a thorough discussion of this condition and the management approach to it (including a comprehensive discussion of the known risks, side effects and potential benefits of treatment), the patient (family) agrees to implement the following specific plan:  Reassured benign.   Monitor for changes. ABCDE's of melanoma handout provided.   Practice sun protection. Apply broad spectrum (UVA and UVB) sunscreen, SPF 30 or higher every 2 hours. Wear sun protective clothing, hats, and sunglasses.        LENTIGO    Physical Exam:  Anatomic Location Affected:  sun exposed areas of trunk and extremities   Morphological Description:  multiple scattered tan to brown evenly pigmented macules      Additional History of Present Condition:  Present on exam.     Assessment and Plan:  Based on a thorough discussion of this condition and the management approach to it (including a comprehensive discussion of the known risks, side effects and potential benefits of treatment), the patient (family) agrees to implement the following specific plan:  Reassured benign.   Practice sun protection. Apply broad spectrum (UVA and UVB) sunscreen, SPF 30 or higher every 2 hours. Wear sun protective clothing, hats, and sunglasses.       SEBORRHEIC KERATOSIS; NON-INFLAMED    Physical Exam:  Anatomic Location Affected:  right cheek, trunk and extremities   Morphological Description:  Flat and raised, waxy, smooth to warty textured, yellow to brownish-grey to dark brown to blackish, discrete, \"stuck-on\" appearing " "papules.      Additional History of Present Condition:  Present on exam. Patient denies any itching.     Assessment and Plan:  Based on a thorough discussion of this condition and the management approach to it (including a comprehensive discussion of the known risks, side effects and potential benefits of treatment), the patient (family) agrees to implement the following specific plan:  Reassured benign.     ANGIOMA (\"CHERRY ANGIOMA\")     Physical Exam:  Anatomic Location: scattered across trunk and extremities   Morphologic Description: 1-2 mm firm red to maroon to purples to blue discrete papule, on dermoscopy lacunae  by white septae seen       Additional History of Present Condition:  Present on exam.     Plan:  Reassured benign.       ACTINIC KERATOSES  Physical Exam:  Anatomic Location Affected:  nasal tip  Morphological Description:  Thin pink papule(s) with gritty scale       Assessment and Plan:  Based on a thorough discussion of this condition and the management approach to it (including a comprehensive discussion of the known risks, side effects and potential benefits of treatment), the patient (family) agrees to implement the following specific plan:  Treated with cryotherapy today; written and verbal consent obtained. See procedure below.   Follow-up in 4-6 weeks if no resolution.     PROCEDURE:  DESTRUCTION OF PRE-MALIGNANT LESIONS  After a thorough discussion of treatment options and risk/benefits/alternatives (including but not limited to local pain, scarring, dyspigmentation, blistering, and possible superinfection), verbal and written consent were obtained and the aforementioned lesions were treated on with cryotherapy using liquid nitrogen x 2 cycles for 5-10 seconds. The patient tolerated the procedure well, and after-care instructions were provided.     TOTAL NUMBER of 1 pre-malignant lesions were treated today on the ANATOMIC LOCATION: nasal tip.       Patient instructions:  Your " pre-cancerous lesions (called actinic keratosis) were treated with liquid nitrogen today. The treated areas will get more red, crusted over the next few days. There might be some blistering. Apply vaseline to the treated area for the next week to help it heal fully. Do not pick at the area. Return in 3-4 weeks for another round of liquid nitrogen treatment if lesion(s)  fails to fully resolve.    Follow-up:  year for skin exam.   Scribe Attestation      I,:  Shara Sanchez MA am acting as a scribe while in the presence of the attending physician.:       I,:  Nieves Jansen PA-C personally performed the services described in this documentation    as scribed in my presence.:         '         [1]   Allergies  Allergen Reactions    Cashew Nut Oil - Food Allergy Anaphylaxis    Mangifera Indica Anaphylaxis    Nuts - Food Allergy Anaphylaxis     Pistachio      Dust Mite Extract Nasal Congestion    Grass Pollen(K-O-R-T-Swt Bryan) Nasal Congestion    Other Sneezing    Pollen Extract Sneezing    Short Ragweed Pollen Ext Sneezing    Sunflower Oil - Food Allergy Swelling    Hydromorphone Nausea Only     Other reaction(s): Urticaria   [2]   Current Outpatient Medications:     atorvastatin (LIPITOR) 10 mg tablet, Take 1 tablet (10 mg total) by mouth daily, Disp: 90 tablet, Rfl: 2    Calcium Carbonate-Vit D-Min (CALCIUM 1200 PO), Take by mouth, Disp: , Rfl:     cetirizine (ZyrTEC Allergy) 10 mg tablet, Take 10 mg by mouth in the morning., Disp: , Rfl:     Cholecalciferol (Vitamin D) 50 MCG (2000 UT) CAPS, Take 4,000 Units by mouth in the morning., Disp: , Rfl:     FLUoxetine (PROzac) 20 mg capsule, TAKE 1 CAPSULE BY MOUTH EVERY DAY, Disp: 90 capsule, Rfl: 1    multivitamin (THERAGRAN) TABS, Take 1 tablet by mouth in the morning., Disp: , Rfl:     nystatin (MYCOSTATIN) cream, Apply topically 2 (two) times a day as needed (rash), Disp: 60 g, Rfl: 2    nystatin (MYCOSTATIN) powder, Apply topically 2 (two) times a day as needed  (rash), Disp: 60 g, Rfl: 2    omeprazole (PriLOSEC) 20 mg delayed release capsule, Take 20 mg by mouth every other day, Disp: , Rfl:     Tirzepatide-Weight Management (Zepbound) 2.5 mg/0.5 mL subcutaneous solution (vial), Inject 0.5 mL (2.5 mg total) under the skin once a week, Disp: 2 mL, Rfl: 0    aspirin (ECOTRIN LOW STRENGTH) 81 mg EC tablet, Take 1 tablet (81 mg total) by mouth 2 (two) times a day, Disp: 84 tablet, Rfl: 0    FLUoxetine (PROzac) 10 mg capsule, Take 1 capsule (10 mg total) by mouth daily for 14 days, Disp: 14 capsule, Rfl: 0

## 2025-06-05 NOTE — TELEPHONE ENCOUNTER
He has an appointment tomorrow - can he wait until then for refills?   If not, how he is doing on this? Any side effects or weight loss? Confirm pharmacy.

## 2025-06-06 ENCOUNTER — TELEMEDICINE (OUTPATIENT)
Dept: FAMILY MEDICINE CLINIC | Facility: CLINIC | Age: 70
End: 2025-06-06
Payer: MEDICARE

## 2025-06-06 VITALS — WEIGHT: 291 LBS | BODY MASS INDEX: 41.75 KG/M2

## 2025-06-06 DIAGNOSIS — E66.813 CLASS 3 SEVERE OBESITY DUE TO EXCESS CALORIES WITH SERIOUS COMORBIDITY AND BODY MASS INDEX (BMI) OF 40.0 TO 44.9 IN ADULT: ICD-10-CM

## 2025-06-06 DIAGNOSIS — G47.33 OSA ON CPAP: ICD-10-CM

## 2025-06-06 PROCEDURE — G2211 COMPLEX E/M VISIT ADD ON: HCPCS | Performed by: FAMILY MEDICINE

## 2025-06-06 PROCEDURE — 99214 OFFICE O/P EST MOD 30 MIN: CPT | Performed by: FAMILY MEDICINE

## 2025-06-06 RX ORDER — TIRZEPATIDE 2.5 MG/.5ML
2.5 INJECTION, SOLUTION SUBCUTANEOUS WEEKLY
Qty: 2 ML | Refills: 2 | Status: SHIPPED | OUTPATIENT
Start: 2025-06-06

## 2025-06-06 RX ORDER — TIRZEPATIDE 2.5 MG/.5ML
INJECTION, SOLUTION SUBCUTANEOUS
Qty: 2 ML | Refills: 0 | OUTPATIENT
Start: 2025-06-06

## 2025-06-06 NOTE — ASSESSMENT & PLAN NOTE
Last visit patient started on Zepbound 2.5 mg.  He is doing extremely well with an 8 pound weight loss.  Tolerating the medication well.  If he is able to he would like to stay on 2.5 mg at this time.  Prescription sent in with refills to [facility].  He is willing to increase to 5 mg if required due to this being the maintenance dose.  Orders:    Tirzepatide-Weight Management (Zepbound) 2.5 mg/0.5 mL subcutaneous solution (vial); Inject 0.5 mL (2.5 mg total) under the skin once a week

## 2025-06-06 NOTE — PROGRESS NOTES
Virtual Regular Visit  Name: Gavino Hall      : 1955      MRN: [id number]  Encounter Provider: Sera Richter DO  Encounter Date: 2025   Encounter department: [facility]    Assessment/Plan:   :  Assessment & Plan  GILLIAN on CPAP  Last visit patient started on Zepbound 2.5 mg.  He is doing extremely well with an 8 pound weight loss.  Tolerating the medication well.  If he is able to he would like to stay on 2.5 mg at this time.  Prescription sent in with refills to [facility].  He is willing to increase to 5 mg if required due to this being the maintenance dose.  Orders:    Tirzepatide-Weight Management (Zepbound) 2.5 mg/0.5 mL subcutaneous solution (vial); Inject 0.5 mL (2.5 mg total) under the skin once a week    Class 3 severe obesity due to excess calories with serious comorbidity and body mass index (BMI) of 40.0 to 44.9 in adult  Last visit patient started on Zepbound 2.5 mg.  He is doing extremely well with an 8 pound weight loss.  Tolerating the medication well.  If he is able to he would like to stay on 2.5 mg at this time.  Prescription sent in with refills to [facility].  He is willing to increase to 5 mg if required due to this being the maintenance dose.    Orders:    Tirzepatide-Weight Management (Zepbound) 2.5 mg/0.5 mL subcutaneous solution (vial); Inject 0.5 mL (2.5 mg total) under the skin once a week      Assessment & Plan            No follow-ups on file.    Subjective    Gavino Hall is a 69 y.o. male is being seen via Video Visit today.  Reason for visit is   Chief Complaint   Patient presents with    Follow-up     Would like to go over medication changes and provide pcp with an update         Today's concerns are:        Since last visit feeling pretty good   Transition from Lexapro to Prozac is good   Taking Zepbound - 3rd injection   Has lost 8 pounds so far   Appetite down   Occasional queasy feeling   Thinks he is doing well with his  mood   Anxious from time to time  Has been on prozac 20 mg daily     Vitals:    25 1113   Weight: 132 kg (291 lb)     Wt Readings from Last 3 Encounters:   25 132 kg (291 lb)   25 (!) 138 kg (304 lb)   25 (!) 138 kg (304 lb)     BP Readings from Last 3 Encounters:   25 120/80   10/28/24 108/61   10/25/24 104/68       PHQ-2/9 Depression Screening    Little interest or pleasure in doing things: 1 - several days  Feeling down, depressed, or hopeless: 1 - several days  Trouble falling or staying asleep, or sleeping too much: 0 - not at all  Feeling tired or having little energy: 1 - several days  Poor appetite or overeatin - not at all  Feeling bad about yourself - or that you are a failure or have let yourself or your family down: 1 - several days  Trouble concentrating on things, such as reading the newspaper or watching television: 0 - not at all  Moving or speaking so slowly that other people could have noticed. Or the opposite - being so fidgety or restless that you have been moving around a lot more than usual: 0 - not at all  Thoughts that you would be better off dead, or of hurting yourself in some way: 0 - not at all  PHQ-9 Score: 4  PHQ-9 Interpretation: No or Minimal depression       TONYA-7 Flowsheet Screening      Flowsheet Row Most Recent Value   Over the last two weeks, how often have you been bothered by the following problems?     Feeling nervous, anxious, or on edge 1   Not being able to stop or control worrying 0   Worrying too much about different things 0   Trouble relaxing  0   Being so restless that it's hard to sit still 0   Becoming easily annoyed or irritable  0   Feeling afraid as if something awful might happen 0   How difficult have these problems made it for you to do your work, take care of things at home, or get along with other people?  Not difficult at all   TONYA Score  1             Current Medications:  Current Medications[1]      Allergies:  Allergies[2]    Review of Systems  all others negative - no chest pain, SOB, normal urine and bowels. no GERD. sleeping well. mood good.     Objective   Wt 132 kg (291 lb) Comment: per pt naked weight  BMI 41.75 kg/m²     Physical Exam  Video Exam Pt not examined in person - seen over epic virtual video visit   Constitutional:  he appears well-developed and well-nourished.   HENT: Head: Normocephalic.   Right Ear: External ear normal.   Left Ear: External ear normal.   Nose: Nose normal.   Eyes: Pupils are equal, round, and reactive to light. Right eye exhibits no discharge. Left eye exhibits no discharge. No scleral icterus.   Neck: Normal range of motion.   Pulmonary/Chest: Effort normal. No respiratory distress.   Neurological: he is alert and oriented to person, place, and time.   Skin: Skin is warm and dry on face - no rashes. Not pale. Not diaphoretic.   Psychiatric: he  has a normal mood and affect. he behavior is normal. Thought content normal.     Administrative Statements   Encounter provider Sera Richter, DO    The Patient is located at Home and in the following state in which I hold an active license [place].    The patient was identified by name and date of birth. Gavino Hall was informed that this is a telemedicine visit and that the visit is being conducted through the Epic Embedded platform. He agrees to proceed..  My office door was closed. No one else was in the room.  He acknowledged consent and understanding of privacy and security of the video platform. The patient has agreed to participate and understands they can discontinue the visit at any time.                [1]   Current Outpatient Medications   Medication Sig Dispense Refill    aspirin (ECOTRIN LOW STRENGTH) 81 mg EC tablet Take 1 tablet (81 mg total) by mouth 2 (two) times a day (Patient taking differently: Take 81 mg by mouth daily) 84 tablet 0    atorvastatin (LIPITOR) 10 mg tablet Take 1 tablet (10 mg total) by  mouth daily 90 tablet 2    Calcium Carbonate-Vit D-Min (CALCIUM 1200 PO) Take by mouth      cetirizine (ZyrTEC Allergy) 10 mg tablet Take 10 mg by mouth in the morning.      Cholecalciferol (Vitamin D) 50 MCG (2000 UT) CAPS Take 4,000 Units by mouth in the morning.      FLUoxetine (PROzac) 20 mg capsule TAKE 1 CAPSULE BY MOUTH EVERY DAY 90 capsule 1    multivitamin (THERAGRAN) TABS Take 1 tablet by mouth in the morning.      nystatin (MYCOSTATIN) cream Apply topically 2 (two) times a day as needed (rash) 60 g 2    nystatin (MYCOSTATIN) powder Apply topically 2 (two) times a day as needed (rash) 60 g 2    omeprazole (PriLOSEC) 20 mg delayed release capsule Take 20 mg by mouth every other day      Tirzepatide-Weight Management (Zepbound) 2.5 mg/0.5 mL subcutaneous solution (vial) Inject 0.5 mL (2.5 mg total) under the skin once a week 2 mL 2     No current facility-administered medications for this visit.   [2]   Allergies  Allergen Reactions    Cashew Nut Oil - Food Allergy Anaphylaxis    Mangifera Indica Anaphylaxis    Nuts - Food Allergy Anaphylaxis     Pistachio    Dust Mite Extract Nasal Congestion    Grass Pollen(K-O-R-T-Swt Bryan) Nasal Congestion    Other Sneezing    Pollen Extract Sneezing    Short Ragweed Pollen Ext Sneezing    Sunflower Oil - Food Allergy Swelling    Hydromorphone Nausea Only     Other reaction(s): Urticaria

## 2025-06-09 ENCOUNTER — PATIENT MESSAGE (OUTPATIENT)
Dept: FAMILY MEDICINE CLINIC | Facility: CLINIC | Age: 70
End: 2025-06-09

## 2025-06-09 DIAGNOSIS — M79.605 PAIN OF LEFT LOWER EXTREMITY: Primary | ICD-10-CM

## 2025-07-06 ENCOUNTER — PATIENT MESSAGE (OUTPATIENT)
Dept: FAMILY MEDICINE CLINIC | Facility: CLINIC | Age: 70
End: 2025-07-06

## 2025-07-06 DIAGNOSIS — E66.813 CLASS 3 SEVERE OBESITY DUE TO EXCESS CALORIES WITH SERIOUS COMORBIDITY AND BODY MASS INDEX (BMI) OF 40.0 TO 44.9 IN ADULT: Primary | ICD-10-CM

## 2025-07-07 RX ORDER — TIRZEPATIDE 5 MG/.5ML
5 INJECTION, SOLUTION SUBCUTANEOUS WEEKLY
Qty: 2 ML | Refills: 2 | Status: SHIPPED | OUTPATIENT
Start: 2025-07-07 | End: 2025-07-14

## 2025-07-09 ENCOUNTER — PATIENT MESSAGE (OUTPATIENT)
Dept: FAMILY MEDICINE CLINIC | Facility: CLINIC | Age: 70
End: 2025-07-09

## 2025-07-14 ENCOUNTER — PATIENT MESSAGE (OUTPATIENT)
Dept: FAMILY MEDICINE CLINIC | Facility: CLINIC | Age: 70
End: 2025-07-14

## 2025-07-14 DIAGNOSIS — R73.01 IFG (IMPAIRED FASTING GLUCOSE): Primary | ICD-10-CM

## 2025-07-14 DIAGNOSIS — E66.813 CLASS 3 SEVERE OBESITY DUE TO EXCESS CALORIES WITH SERIOUS COMORBIDITY AND BODY MASS INDEX (BMI) OF 40.0 TO 44.9 IN ADULT: ICD-10-CM

## 2025-07-14 DIAGNOSIS — G47.33 OSA ON CPAP: ICD-10-CM

## 2025-07-14 RX ORDER — TIRZEPATIDE 5 MG/.5ML
5 INJECTION, SOLUTION SUBCUTANEOUS WEEKLY
Qty: 2 ML | Refills: 2 | Status: SHIPPED | OUTPATIENT
Start: 2025-07-14

## 2025-07-14 NOTE — PATIENT COMMUNICATION
Called pt's pharmacy in regards to some concerns, they stated that he checked out today and there does no seem to be any further actions needed.

## 2025-07-14 NOTE — PATIENT COMMUNICATION
Patient called to confirm mychart message was received.  He needs the order for Zepbound 5mg resent to [facility] as the vials, not auto-injector.    Please resend asap, his injection is due 7/16/25

## 2025-07-22 ENCOUNTER — EVALUATION (OUTPATIENT)
Dept: PHYSICAL THERAPY | Facility: MEDICAL CENTER | Age: 70
End: 2025-07-22
Attending: FAMILY MEDICINE
Payer: MEDICARE

## 2025-07-22 DIAGNOSIS — M79.605 PAIN OF LEFT LOWER EXTREMITY: Primary | ICD-10-CM

## 2025-07-22 PROCEDURE — 97161 PT EVAL LOW COMPLEX 20 MIN: CPT | Performed by: PHYSICAL MEDICINE & REHABILITATION

## 2025-07-22 PROCEDURE — 97112 NEUROMUSCULAR REEDUCATION: CPT | Performed by: PHYSICAL MEDICINE & REHABILITATION

## 2025-07-22 NOTE — PROGRESS NOTES
PT Evaluation     Today's date: 2025  Patient name: Gavino Hall  : 1955  MRN: [id number]  Referring provider: Sera Richter DO  Dx:   Encounter Diagnosis     ICD-10-CM    1. Pain of left lower extremity  M79.605 Ambulatory Referral to Physical Therapy                     Assessment  Impairments: abnormal coordination, abnormal gait, abnormal muscle firing, abnormal or restricted ROM, abnormal movement, activity intolerance, impaired balance, impaired physical strength, lacks appropriate home exercise program, pain with function and weight-bearing intolerance    Assessment details: Patient is a 69 y.o. male who reports to outpatient physical therapy with left posterior-lateral leg pain. No further referral appears necessary at this time based upon examination results. Probable movement impairment diagnosis of fibular head hypomobility resulting in pathoanatomical symptoms of pain, decreased motion and decreased strength and limiting ability to sit for long periods of time, exercise, ambulate down stairs and get out of cars. Skilled physical therapy is warranted for the application of the interventions found below in the planned intervention section.    Prognosis is good given HEP compliance and attendance to physical therapy 2x for 8 weeks.     Please contact me if you have any questions or recommendations. Thank you for the referral and the opportunity to share in Gavino's care.    Patient verbalized understanding of POC, HEP, and return demonstrated HEP.    Understanding of Dx/Px/POC: good     Prognosis: good    Goals  Impairment Goals  - Decrease pain by 50% in 6 weeks.  - Increase left flexibility by 50% in 8 weeks.  - Increase left lower extremity strength golbally to 4+/5 in 6 weeks.    Functional Goals  - Return to Prior Level of Function in 8 weeks  - Patient will be independent with HEP in 8 weeks    Plan  Patient would benefit from: skilled PT  Planned modality interventions:  cryotherapy    Planned therapy interventions: joint mobilization, manual therapy, neuromuscular re-education, patient education, strengthening, stretching, therapeutic activities, therapeutic exercise, home exercise program, functional ROM exercises, Marino taping and postural training    Frequency: 2-3x week.  Treatment plan discussed with: patient        Subjective Evaluation    History of Present Illness  Mechanism of injury: Work/School: retired,  manager, desk job  Home Life: relatively sedentary, pilates on a reformer 2x a week, working with a ,   Hobbies/exercise: light yard work, plays the "[employer]   Pain location: lateral left leg, along fibular head,   HPI: Eduardo reported 3-6 months ago.   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, R TKA,   Patient Goals  Patient goals for therapy: increased motion, increased strength and decreased pain    Pain  Current pain ratin  At best pain ratin  At worst pain ratin          Objective     Static Posture     Comments  MMTs:  Hip flex (L1-L2): R: 4-/5, L: 3+/5  Knee ext (L3-L4): R: 4+/5, L: 4+/5  Knee flex (S2-S3): R: 4-/5, L: 3+/5  DF (L4): R: 4/5, L: 4/5  Great Toe (L5): R: 4/5, L: 4/5  Standing PF (S1): R: 3+/5, L: 3+/5  Sidelying Hip Abd (L4-S1): R: 3-/5, L: 3-/5    ROM:  Knee Ext (0): AROM: R: 0, L: 0:  Knee Flex (0-130): AROM: R: 112, L: 110:  SLR (0-90): PROM: R: 90, L: 90  DF (0-15): PROM: R: 5, L: 8  PF (0-45): AROM: R: WNL, L: WNL:    TTP along anterior fibular head, superior lateral gastroc, inferior biceps femoris,    Discomfort improved following fibular head mobilization.              Precautions: none      Manuals 2025                                                                Neuro Re-Ed                                                                                           HEP and Return  Demo 10'            Ther Ex                                                                                                                     Ther Activity                                       Gait Training                                       Modalities

## 2025-08-06 ENCOUNTER — OFFICE VISIT (OUTPATIENT)
Dept: PHYSICAL THERAPY | Facility: MEDICAL CENTER | Age: 70
End: 2025-08-06
Attending: FAMILY MEDICINE
Payer: MEDICARE

## 2025-08-06 DIAGNOSIS — M79.605 PAIN OF LEFT LOWER EXTREMITY: Primary | ICD-10-CM

## 2025-08-06 PROCEDURE — 97140 MANUAL THERAPY 1/> REGIONS: CPT | Performed by: PHYSICAL MEDICINE & REHABILITATION

## 2025-08-06 PROCEDURE — 97110 THERAPEUTIC EXERCISES: CPT | Performed by: PHYSICAL MEDICINE & REHABILITATION

## 2025-08-14 ENCOUNTER — OFFICE VISIT (OUTPATIENT)
Dept: PHYSICAL THERAPY | Facility: MEDICAL CENTER | Age: 70
End: 2025-08-14
Attending: FAMILY MEDICINE
Payer: MEDICARE

## 2025-08-20 ENCOUNTER — OFFICE VISIT (OUTPATIENT)
Dept: PHYSICAL THERAPY | Facility: MEDICAL CENTER | Age: 70
End: 2025-08-20
Attending: FAMILY MEDICINE
Payer: MEDICARE

## 2025-08-20 DIAGNOSIS — M79.605 PAIN OF LEFT LOWER EXTREMITY: Primary | ICD-10-CM

## 2025-08-20 PROCEDURE — 97140 MANUAL THERAPY 1/> REGIONS: CPT

## 2025-08-20 PROCEDURE — 97110 THERAPEUTIC EXERCISES: CPT

## (undated) DEVICE — BLADE OSC 8 X 65 X 1.9MM

## (undated) DEVICE — DRAPE TOWEL 17X23 NON-STERILE

## (undated) DEVICE — *T* BUR 5 MM ACORN

## (undated) DEVICE — BANDAGE ESMARK 6X12 LATEX FREE

## (undated) DEVICE — GLOVE INDICATOR PI UNDERGLOVE SZ 8 BLUE

## (undated) DEVICE — DRAPE C-ARMOUR

## (undated) DEVICE — TUBERCLE PIN POUCH
Type: IMPLANTABLE DEVICE | Site: ANKLE | Status: NON-FUNCTIONAL
Brand: VANTAGE
Removed: 2024-10-28

## (undated) DEVICE — SUTURE MONOCRYL 4-0  Y496G PS-2 I8IN

## (undated) DEVICE — NOZZLE 90 DEGREE TIBIAL PRESSURIZER

## (undated) DEVICE — GLOVE INDICATOR PI UNDERGLOVE SZ 6.5 BLUE

## (undated) DEVICE — GOWN SURGICAL REINFORCED X-LAR

## (undated) DEVICE — 2.5" PIN POUCH
Type: IMPLANTABLE DEVICE | Site: ANKLE | Status: NON-FUNCTIONAL
Brand: VANTAGE
Removed: 2024-10-28

## (undated) DEVICE — BLADE SAGITTAL #127 STABLECUT

## (undated) DEVICE — IMPERVIOUS STOCKINETTE: Brand: DEROYAL

## (undated) DEVICE — SUTURE VICRYL 1 J699H OS-8 27IN UNDYED

## (undated) DEVICE — SPONGE LAP DISPOSABLE 18X18

## (undated) DEVICE — MICRO DUAL CUT (9.0 X 0.38 X 18.5MM)

## (undated) DEVICE — SUT VICRYL 4-0 PS-2 18 IN J496G

## (undated) DEVICE — INTENDED FOR TISSUE SEPARATION, AND OTHER PROCEDURES THAT REQUIRE A SHARP SURGICAL BLADE TO PUNCTURE OR CUT.: Brand: BARD-PARKER ® CARBON RIB-BACK BLADES

## (undated) DEVICE — SUCTION 18FR FRAZIER DISPOSABLE

## (undated) DEVICE — VEST STERILE

## (undated) DEVICE — DRILL: Brand: SONICFUSION

## (undated) DEVICE — SUTURE VICRYL PLUS 2-0 VCP869H

## (undated) DEVICE — BLADE SAGITTAL #152 STRYKER NARROW THICK NO OFFSET 12.5MM

## (undated) DEVICE — ***USE 57698*** SLEEVE FLOWTRON DVT CALF SINGLE USE

## (undated) DEVICE — PAD GROUND ELECTROSURGICAL W/CORD

## (undated) DEVICE — HOOD FLYTE SURGICOOL

## (undated) DEVICE — BANDAGE, ESMARK LF STR 6"X9' (20/CS): Brand: CYPRESS

## (undated) DEVICE — PRECISION THIN EXTENDED SHANK (27.0 X 0.38MM)

## (undated) DEVICE — SOLN IRRIG STER WATER 1000ML

## (undated) DEVICE — CAST PADDING 6IN UNSTERILE

## (undated) DEVICE — HEAVY DUTY TABLE COVER: Brand: CONVERTORS

## (undated) DEVICE — CAST PADDING 4 IN STERILE

## (undated) DEVICE — SUT ETHILON 3-0 PS-1 18 IN 1663H

## (undated) DEVICE — TAPE CAST 4IN FIBERGLASS 4YD WHITE

## (undated) DEVICE — SET HANDPIECE INTERPULSE

## (undated) DEVICE — PREP SURGICAL PURPREP 26ML

## (undated) DEVICE — ABDOMINAL PAD: Brand: DERMACEA

## (undated) DEVICE — GLOVE SZ 8.5 LINER PROTEXIS PI BL

## (undated) DEVICE — CUFF TOURNIQUET 30 X 4 IN QUICK CONNECT DISP 1BLA

## (undated) DEVICE — INTENDED FOR TISSUE SEPARATION, AND OTHER PROCEDURES THAT REQUIRE A SHARP SURGICAL BLADE TO PUNCTURE OR CUT.: Brand: BARD-PARKER SAFETY BLADES SIZE 15, STERILE

## (undated) DEVICE — CUFF TOURNIQUET DISP 34 X 4

## (undated) DEVICE — BLADE SAW SABO

## (undated) DEVICE — MANIFOLD FOUR PORT NEPTUNE

## (undated) DEVICE — BLADE OSC 10 X 75 X 1.9MM

## (undated) DEVICE — COVER LIGHTHANDLE

## (undated) DEVICE — GLOVE SRG BIOGEL 6

## (undated) DEVICE — GAUZE SPONGES,16 PLY: Brand: CURITY

## (undated) DEVICE — DRAPE SPLIT U IMPERVIOUS 89331

## (undated) DEVICE — TUBING SMOKE EVAC PENCIL COATED

## (undated) DEVICE — C-ARM: Brand: UNBRANDED

## (undated) DEVICE — BLADE RECIP 8  X 50 X 1MM

## (undated) DEVICE — ADHESIVE SKIN DERMABOND ADVANCED 0.7ML

## (undated) DEVICE — BETHLEHEM UNIV MAJ EXT ,KIT: Brand: CARDINAL HEALTH

## (undated) DEVICE — SUTURE STRATAFIX PDO 1 OS-8 CUTTING 36CM

## (undated) DEVICE — NEPTUNE E-SEP SMOKE EVACUATION PENCIL, COATED, 70MM BLADE, PUSH BUTTON SWITCH: Brand: NEPTUNE E-SEP

## (undated) DEVICE — DRESSING MEPILEX 4X10 BORDER

## (undated) DEVICE — TALAR TRIAL SCREW POUCH
Type: IMPLANTABLE DEVICE | Site: ANKLE | Status: NON-FUNCTIONAL
Brand: VANTAGE
Removed: 2024-10-28

## (undated) DEVICE — SYRINGE DISP LUER-LOK 30 CC

## (undated) DEVICE — NEEDLE DISP HYPO 18GX1-1/2IN

## (undated) DEVICE — GLOVE SURG PROTEXIS PF 8.5

## (undated) DEVICE — OCCLUSIVE GAUZE STRIP,3% BISMUTH TRIBROMOPHENATE IN PETROLATUM BLEND: Brand: XEROFORM

## (undated) DEVICE — SPECIMEN CONTAINER STERILE PEEL PACK

## (undated) DEVICE — DRAPE SHEET THREE QUARTER

## (undated) DEVICE — SUT VICRYL 2-0 CT-2 27 IN J269H

## (undated) DEVICE — GLOVE SRG BIOGEL 8

## (undated) DEVICE — Device

## (undated) DEVICE — 3.5" PIN POUCH
Type: IMPLANTABLE DEVICE | Site: ANKLE | Status: NON-FUNCTIONAL
Brand: VANTAGE
Removed: 2024-10-28

## (undated) DEVICE — KIT CEMENT MIXING CARTRIDGE AND NOZZLE

## (undated) DEVICE — SPECIMEN TRAP MUCOUS 40CC

## (undated) DEVICE — DRAPE-U NON-STERILE

## (undated) DEVICE — 3M™ TEGADERM™ TRANSPARENT FILM DRESSING FRAME STYLE, 1626W, 4 IN X 4-3/4 IN (10 CM X 12 CM), 50/CT 4CT/CASE: Brand: 3M™ TEGADERM™

## (undated) DEVICE — TIP COAXIAL FEMORAL CANAL

## (undated) DEVICE — APPLICATOR CHLORAPREP 26ML ORANGE TINT